# Patient Record
Sex: MALE | Race: WHITE | Employment: UNEMPLOYED | URBAN - METROPOLITAN AREA
[De-identification: names, ages, dates, MRNs, and addresses within clinical notes are randomized per-mention and may not be internally consistent; named-entity substitution may affect disease eponyms.]

---

## 2018-04-11 ENCOUNTER — OFFICE VISIT (OUTPATIENT)
Dept: FAMILY MEDICINE | Facility: CLINIC | Age: 28
End: 2018-04-11
Payer: COMMERCIAL

## 2018-04-11 VITALS
HEART RATE: 87 BPM | SYSTOLIC BLOOD PRESSURE: 121 MMHG | WEIGHT: 166 LBS | HEIGHT: 70 IN | DIASTOLIC BLOOD PRESSURE: 82 MMHG | BODY MASS INDEX: 23.77 KG/M2 | TEMPERATURE: 96.3 F

## 2018-04-11 DIAGNOSIS — F41.1 GAD (GENERALIZED ANXIETY DISORDER): Primary | ICD-10-CM

## 2018-04-11 PROCEDURE — 99213 OFFICE O/P EST LOW 20 MIN: CPT | Performed by: FAMILY MEDICINE

## 2018-04-11 ASSESSMENT — ANXIETY QUESTIONNAIRES
3. WORRYING TOO MUCH ABOUT DIFFERENT THINGS: NOT AT ALL
6. BECOMING EASILY ANNOYED OR IRRITABLE: NOT AT ALL
7. FEELING AFRAID AS IF SOMETHING AWFUL MIGHT HAPPEN: NOT AT ALL
2. NOT BEING ABLE TO STOP OR CONTROL WORRYING: NOT AT ALL
GAD7 TOTAL SCORE: 0
5. BEING SO RESTLESS THAT IT IS HARD TO SIT STILL: NOT AT ALL
1. FEELING NERVOUS, ANXIOUS, OR ON EDGE: NOT AT ALL

## 2018-04-11 ASSESSMENT — PATIENT HEALTH QUESTIONNAIRE - PHQ9: 5. POOR APPETITE OR OVEREATING: NOT AT ALL

## 2018-04-11 NOTE — MR AVS SNAPSHOT
After Visit Summary   4/11/2018    Kyle Ayala    MRN: [id number]           Patient Information     Date Of Birth          1990        Visit Information        Provider Department      4/11/2018 8:00 AM David García MD [facility]        Today's Diagnoses     BRIGETTE (generalized anxiety disorder)    -  1       Follow-ups after your visit        Additional Services     MENTAL HEALTH REFERRAL  - Adult; Psychiatry and Medication Management; [place]iatry[place] [facility] [place]   [place], [place] [phone]  Medication management & future refills will be returned to [facility] PCP upon compl...       All scheduling is subject to the client's specific insurance plan & benefits, provider/location availability, and provider clinical specialities.  Please arrive 15 minutes early for your first appointment and bring your completed paperwork.    Please be aware that coverage of these services is subject to the terms and limitations of your health insurance plan.  Call member services at your health plan with any benefit or coverage questions.                            Who to contact     If you have questions or need follow up information about today's clinic visit or your schedule please contact [facility] directly at [phone].  Normal or non-critical lab and imaging results will be communicated to you by MyChart, letter or phone within 4 business days after the clinic has received the results. If you do not hear from us within 7 days, please contact the clinic through MyChart or phone. If you have a critical or abnormal lab result, we will notify you by phone as soon as possible.  Submit refill requests through [employer] or call your pharmacy and they will forward the refill request to us. Please allow 3 business days for your refill to be completed.          Additional Information About Your Visit        MyChart Information     MyChart  "lets you send messages to your doctor, view your test results, renew your prescriptions, schedule appointments and more. To sign up, go to [email or web address] . Click on \"Log in\" on the left side of the screen, which will take you to the Welcome page. Then click on \"Sign up Now\" on the right side of the page.     You will be asked to enter the access code listed below, as well as some personal information. Please follow the directions to create your username and password.     Your access code is: [email or web address]  Expires: 7/10/2018  8:29 AM     Your access code will  in 90 days. If you need help or a new code, please call your [facility] clinic or [phone].        Care EveryWhere ID     This is your Care EveryWhere ID. This could be used by other organizations to access your [facility] medical records  [id number]        Your Vitals Were     Pulse Temperature Height BMI (Body Mass Index)          87 96.3  F (35.7  C) (Tympanic) 5' 10.25\" (1.784 m) 23.65 kg/m2         Blood Pressure from Last 3 Encounters:   18 121/82   10/28/16 142/81   16 126/76    Weight from Last 3 Encounters:   18 166 lb (75.3 kg)   10/28/16 137 lb 9.1 oz (62.4 kg)   16 141 lb 9.6 oz (64.2 kg)              We Performed the Following     DEPRESSION ACTION PLAN (DAP)     MENTAL HEALTH REFERRAL  - Adult; Psychiatry and Medication Management; [place]iatry[place] [facility] [place]   [place], [place] [phone]  Medication management & future refills will be returned to [facility] PCP upon compl...        Primary Care Provider Office Phone # Fax #    [facility] [phone] [address] [place] [address]        Equal Access to Services     PURNIMA ELI AH: marie Trevino, tony shaffer. [facility] [phone].    ATENCIÓN: Si habla español, tiene a monsalve disposición " servicios gratuitos de asistencia lingüística. Janice olivarez [phone].    We comply with applicable federal civil rights laws and [place] laws. We do not discriminate on the basis of race, color, national origin, age, disability, sex, sexual orientation, or gender identity.            Thank you!     Thank you for choosing [facility]  for your care. Our goal is always to provide you with excellent care. Hearing back from our patients is one way we can continue to improve our services. Please take a few minutes to complete the written survey that you may receive in the mail after your visit with us. Thank you!             Your Updated Medication List - Protect others around you: Learn how to safely use, store and throw away your medicines at www.disposemymeds.org.          This list is accurate as of 4/11/18  8:29 AM.  Always use your most recent med list.                   Brand Name Dispense Instructions for use Diagnosis    albuterol 108 (90 Base) MCG/ACT Inhaler    PROAIR HFA/PROVENTIL HFA/VENTOLIN HFA    1 Inhaler    Inhale 2 puffs into the lungs every 6 hours as needed for shortness of breath / dyspnea or wheezing    Moderate persistent asthma without complication       diazepam 5 MG tablet    VALIUM    30 tablet    Take 1 tablet (5 mg) by mouth every 8 hours as needed for anxiety (or tremor)    Alcohol abuse       fluticasone-vilanterol 100-25 MCG/INH oral inhaler    BREO ELLIPTA    3 Inhaler    Inhale 1 puff into the lungs daily    Mild intermittent asthma without complication

## 2018-04-11 NOTE — PROGRESS NOTES
SUBJECTIVE:   Kyle Ayala is a 27 year old male who presents to clinic today for the following health issues:      Patient needs a note for  dog for apt building that he is living in, he was taking a few medications for anxiety, medication were not helping.   Dr. Bridget Rendon said he should give a  dog a try and it has helped      Patient is a 27 yr old male with long history of mental illnesses including depression and anxiety . Review of his chart also reveals some dependence on alcohol. He comes in requesting a letter stating his need for a  animal. He says that he is presently on no medication and what seems to help is a  dog . He is staying in an apartment where pets are not allowed. I told patient it is a difficult thing to assess if indeed he needs a  animal . I will prefer that he sees a psychiatrist for this need.     Problem list and histories reviewed & adjusted, as indicated.  Additional history: as documented    Patient Active Problem List   Diagnosis     Loss of weight     Tobacco abuse     Newly recognized heart murmur     Vomiting     Anxiety     Health Care Home     Mild intermittent asthma     Mild major depression     Acute renal failure (ARF) (H)     Dehydration     Hypokalemia     Hyponatremia     Alcohol dependence (H)     Acute hyperactive alcohol withdrawal delirium (H)     PAF (paroxysmal atrial fibrillation) (H)     Non-rheumatic mitral regurgitation     History reviewed. No pertinent surgical history.    Social History   Substance Use Topics     Smoking status: Current Every Day Smoker     Packs/day: 1.00     Types: Cigarettes     Smokeless tobacco: Never Used     Alcohol use Yes     Family History   Problem Relation Age of Onset     Alcohol/Drug Mother      Asthma Father      Respiratory Father 54     COPD     Alcohol/Drug Father      Alzheimer Disease Maternal Grandfather      dementia     C.A.D. Paternal Grandfather      HEART DISEASE  "Brother      congenital heart, heart surgery when born     Alcohol/Drug Brother          Current Outpatient Prescriptions   Medication Sig Dispense Refill     diazepam (VALIUM) 5 MG tablet Take 1 tablet (5 mg) by mouth every 8 hours as needed for anxiety (or tremor) (Patient not taking: Reported on 4/11/2018) 30 tablet 0     fluticasone - vilanterol (BREO ELLIPTA) 100-25 MCG/INH oral inhaler Inhale 1 puff into the lungs daily (Patient not taking: Reported on 4/11/2018) 3 Inhaler 1     albuterol (PROAIR HFA, PROVENTIL HFA, VENTOLIN HFA) 108 (90 BASE) MCG/ACT inhaler Inhale 2 puffs into the lungs every 6 hours as needed for shortness of breath / dyspnea or wheezing (Patient not taking: Reported on 4/11/2018) 1 Inhaler 0     Allergies   Allergen Reactions     Penicillins      Patient says he was allergic to penicllin as a child but not anymore     BP Readings from Last 3 Encounters:   04/11/18 121/82   10/28/16 142/81   03/16/16 126/76    Wt Readings from Last 3 Encounters:   04/11/18 166 lb (75.3 kg)   10/28/16 137 lb 9.1 oz (62.4 kg)   03/03/16 141 lb 9.6 oz (64.2 kg)                  Labs reviewed in EPIC    Reviewed and updated as needed this visit by clinical staff  Tobacco  Allergies  Med Hx  Surg Hx  Fam Hx  Soc Hx      Reviewed and updated as needed this visit by Provider         ROS:  Constitutional, HEENT, cardiovascular, pulmonary, gi and gu systems are negative, except as otherwise noted.    OBJECTIVE:     /82 (BP Location: Left arm, Cuff Size: Adult Regular)  Pulse 87  Temp 96.3  F (35.7  C) (Tympanic)  Ht 5' 10.25\" (1.784 m)  Wt 166 lb (75.3 kg)  BMI 23.65 kg/m2  Body mass index is 23.65 kg/(m^2).  GENERAL: healthy, alert and no distress  EYES: Eyes grossly normal to inspection, PERRL and conjunctivae and sclerae normal  HENT: ear canals and TM's normal, nose and mouth without ulcers or lesions  NECK: no adenopathy, no asymmetry, masses, or scars and thyroid normal to palpation  RESP: " lungs clear to auscultation - no rales, rhonchi or wheezes  CV: regular rate and rhythm, normal S1 S2, no S3 or S4, no murmur, click or rub, no peripheral edema and peripheral pulses strong  MS: no gross musculoskeletal defects noted, no edema  PSYCH: mentation appears normal and affect normal/bright    Diagnostic Test Results:  none     ASSESSMENT/PLAN:         (F41.1) BRIGETTE (generalized anxiety disorder)  (primary encounter diagnosis)  Comment: Referred to psychiatry for medication and assess  Need for a  animal  Plan: MENTAL HEALTH REFERRAL  - Adult; Psychiatry and        Medication Management; Psychiatry; [facility] [place]:         Collaborative Care Psychiatry Service -         [place], [place] [phone]          Medication management & future refills will be         returned to G PCP upon compl...              FUTURE APPOINTMENTS:       - Follow-up visit as needed    David García MD  [facility]

## 2018-04-11 NOTE — NURSING NOTE
"Chief Complaint   Patient presents with     Letter Request     for ton dag        Initial /82 (BP Location: Left arm, Cuff Size: Adult Regular)  Pulse 87  Temp 96.3  F (35.7  C) (Tympanic)  Ht 5' 10.25\" (1.784 m)  Wt 166 lb (75.3 kg)  BMI 23.65 kg/m2 Estimated body mass index is 23.65 kg/(m^2) as calculated from the following:    Height as of this encounter: 5' 10.25\" (1.784 m).    Weight as of this encounter: 166 lb (75.3 kg).  Medication Reconciliation: complete  "

## 2018-04-12 ASSESSMENT — ASTHMA QUESTIONNAIRES: ACT_TOTALSCORE: 25

## 2018-04-12 ASSESSMENT — PATIENT HEALTH QUESTIONNAIRE - PHQ9: SUM OF ALL RESPONSES TO PHQ QUESTIONS 1-9: 0

## 2018-04-12 ASSESSMENT — ANXIETY QUESTIONNAIRES: GAD7 TOTAL SCORE: 0

## 2018-09-10 ENCOUNTER — OFFICE VISIT (OUTPATIENT)
Dept: FAMILY MEDICINE | Facility: CLINIC | Age: 28
End: 2018-09-10

## 2018-09-10 VITALS
BODY MASS INDEX: 20.96 KG/M2 | OXYGEN SATURATION: 98 % | TEMPERATURE: 98.8 F | HEART RATE: 111 BPM | HEIGHT: 70 IN | DIASTOLIC BLOOD PRESSURE: 72 MMHG | WEIGHT: 146.4 LBS | SYSTOLIC BLOOD PRESSURE: 122 MMHG

## 2018-09-10 DIAGNOSIS — G47.00 INSOMNIA, UNSPECIFIED TYPE: Primary | ICD-10-CM

## 2018-09-10 DIAGNOSIS — F41.9 ANXIETY: ICD-10-CM

## 2018-09-10 DIAGNOSIS — J45.40 MODERATE PERSISTENT ASTHMA WITHOUT COMPLICATION: ICD-10-CM

## 2018-09-10 PROCEDURE — 99214 OFFICE O/P EST MOD 30 MIN: CPT | Performed by: NURSE PRACTITIONER

## 2018-09-10 RX ORDER — DIAZEPAM 5 MG
5 TABLET ORAL DAILY PRN
Qty: 10 TABLET | Refills: 1 | Status: ON HOLD | OUTPATIENT
Start: 2018-09-10 | End: 2019-03-08

## 2018-09-10 RX ORDER — ALBUTEROL SULFATE 90 UG/1
2 AEROSOL, METERED RESPIRATORY (INHALATION) EVERY 6 HOURS PRN
Qty: 1 INHALER | Refills: 0 | Status: SHIPPED | OUTPATIENT
Start: 2018-09-10 | End: 2021-03-03

## 2018-09-10 ASSESSMENT — ANXIETY QUESTIONNAIRES
1. FEELING NERVOUS, ANXIOUS, OR ON EDGE: MORE THAN HALF THE DAYS
GAD7 TOTAL SCORE: 14
6. BECOMING EASILY ANNOYED OR IRRITABLE: MORE THAN HALF THE DAYS
5. BEING SO RESTLESS THAT IT IS HARD TO SIT STILL: MORE THAN HALF THE DAYS
IF YOU CHECKED OFF ANY PROBLEMS ON THIS QUESTIONNAIRE, HOW DIFFICULT HAVE THESE PROBLEMS MADE IT FOR YOU TO DO YOUR WORK, TAKE CARE OF THINGS AT HOME, OR GET ALONG WITH OTHER PEOPLE: VERY DIFFICULT
3. WORRYING TOO MUCH ABOUT DIFFERENT THINGS: MORE THAN HALF THE DAYS
2. NOT BEING ABLE TO STOP OR CONTROL WORRYING: MORE THAN HALF THE DAYS
7. FEELING AFRAID AS IF SOMETHING AWFUL MIGHT HAPPEN: SEVERAL DAYS
4. TROUBLE RELAXING: NEARLY EVERY DAY

## 2018-09-10 NOTE — PROGRESS NOTES
"  SUBJECTIVE:   Kyle Ayala is a 28 year old male who presents to clinic today for the following health issues:    Chief Complaint   Patient presents with     Insomnia     Hasnt been sleeping well, has been taking care of his mom who has dementia      Anxiety     Has been getting worse      Refill Request     albuterol      Weight Loss     Has lost 20 pounds since April        Anxiety Follow-Up    Status since last visit: Worsened, taking care of his mom who has dementia     Other associated symptoms:None    Complicating factors:   Significant life event: Yes-  Taking care of mom who has dementia    Current substance abuse: Cannabis  Depression symptoms: has been feeling sad   BRIGETTE-7 SCORE 1/29/2016 4/11/2018 9/10/2018   Total Score - - -   Total Score 12 0 14       BRIGETTE-7    Amount of exercise or physical activity: walks his dog every day     Problems taking medications regularly: No    Medication side effects: none    Diet: regular (no restrictions)      Problem list and histories reviewed & adjusted, as indicated.  Additional history: as documented    Labs reviewed in EPIC    Reviewed and updated as needed this visit by clinical staff  Tobacco  Allergies  Med Hx  Surg Hx  Fam Hx  Soc Hx      Reviewed and updated as needed this visit by Provider         ROS:  Constitutional, HEENT, cardiovascular, pulmonary, gi and gu systems are negative, except as otherwise noted.    OBJECTIVE:     /72  Pulse 111  Temp 98.8  F (37.1  C) (Tympanic)  Ht 5' 10.25\" (1.784 m)  Wt 146 lb 6.4 oz (66.4 kg)  SpO2 98%  BMI 20.86 kg/m2  Body mass index is 20.86 kg/(m^2).  GENERAL: healthy, alert and no distress  RESP: lungs clear to auscultation - no rales, rhonchi or wheezes  CV: regular rate and rhythm, normal S1 S2, no S3 or S4, no murmur, click or rub, no peripheral edema and peripheral pulses strong  SKIN: no suspicious lesions or rashes  PSYCH: mentation appears normal, affect normal/bright    Diagnostic Test " Results:  none     ASSESSMENT/PLAN:     1. Insomnia, unspecified type  -he already tried Trazodone and Hydroxyzine prescribed by [facility] states it did not help.   -he lost about 20 lbs due to anxiety, forget to eat sometimes, recommended Remeron, but he declined, stating he would like to read about online before he makes decision.   -he will follow up in 1-2 months     2. Anxiety  -provided with small Valium prescription, maximum 10 tablets per month for up to 2 months  -follow up in 1-2 months   - start sertraline (ZOLOFT) 50 MG tablet; Take 1 tablet (50 mg) by mouth daily  Dispense: 30 tablet; Refill: 2  - diazepam (VALIUM) 5 MG tablet; Take 1 tablet (5 mg) by mouth daily as needed for anxiety or sleep  Dispense: 10 tablet; Refill: 1    3. Moderate persistent asthma without complication  -stable, well controlled   - albuterol (PROAIR HFA/PROVENTIL HFA/VENTOLIN HFA) 108 (90 Base) MCG/ACT inhaler; Inhale 2 puffs into the lungs every 6 hours as needed for shortness of breath / dyspnea or wheezing  Dispense: 1 Inhaler; Refill: 0    See Patient Instructions    YOSEF Ramirez [facility]

## 2018-09-10 NOTE — MR AVS SNAPSHOT
"              After Visit Summary   9/10/2018    Kyle Ayala    MRN: [id number]           Patient Information     Date Of Birth          1990        Visit Information        Provider Department      9/10/2018 12:40 PM Carrie Sanchez APRN CNP [facility]        Today's Diagnoses     Insomnia, unspecified type    -  1    Anxiety          Care Instructions    Stat Zoloft 50 mg daily    Valium up to 10 tablets per months, for short term use only, will plan up to 2 months     Please consider other meds for sleep: remeron, silenor  Remeron can also help with weight loss                 Follow-ups after your visit        Who to contact     If you have questions or need follow up information about today's clinic visit or your schedule please contact [facility] directly at [phone].  Normal or non-critical lab and imaging results will be communicated to you by [employer], letter or phone within 4 business days after the clinic has received the results. If you do not hear from us within 7 days, please contact the clinic through [employer] or phone. If you have a critical or abnormal lab result, we will notify you by phone as soon as possible.  Submit refill requests through [employer] or call your pharmacy and they will forward the refill request to us. Please allow 3 business days for your refill to be completed.          Additional Information About Your Visit        MyChart Information     [employer] lets you send messages to your doctor, view your test results, renew your prescriptions, schedule appointments and more. To sign up, go to [email or web address] . Click on \"Log in\" on the left side of the screen, which will take you to the Welcome page. Then click on \"Sign up Now\" on the right side of the page.     You will be asked to enter the access code listed below, as well as some personal information. Please follow the directions to create your username and password.     Your access " "code is: [email or web address]  Expires: 2018 12:27 PM     Your access code will  in 90 days. If you need help or a new code, please call your [facility] clinic or [phone].        Care EveryWhere ID     This is your Care EveryWhere ID. This could be used by other organizations to access your [facility] medical records  [id number]        Your Vitals Were     Pulse Temperature Height Pulse Oximetry BMI (Body Mass Index)       111 98.8  F (37.1  C) (Tympanic) 5' 10.25\" (1.784 m) 98% 20.86 kg/m2        Blood Pressure from Last 3 Encounters:   09/10/18 122/72   18 121/82   10/28/16 142/81    Weight from Last 3 Encounters:   09/10/18 146 lb 6.4 oz (66.4 kg)   18 166 lb (75.3 kg)   10/28/16 137 lb 9.1 oz (62.4 kg)              Today, you had the following     No orders found for display         Today's Medication Changes          These changes are accurate as of 9/10/18  1:05 PM.  If you have any questions, ask your nurse or doctor.               Start taking these medicines.        Dose/Directions    diazepam 5 MG tablet   Commonly known as:  VALIUM   Used for:  Anxiety   Started by:  Carrie Sanchez APRN CNP        Dose:  5 mg   Take 1 tablet (5 mg) by mouth daily as needed for anxiety or sleep   Quantity:  10 tablet   Refills:  1       sertraline 50 MG tablet   Commonly known as:  ZOLOFT   Used for:  Anxiety   Started by:  Carrie Sanchez APRN CNP        Dose:  50 mg   Take 1 tablet (50 mg) by mouth daily   Quantity:  30 tablet   Refills:  2            Where to get your medicines      These medications were sent to [facility] [place], [place] - [address]  [address] [address]     Phone:  [phone]     sertraline 50 MG tablet         Some of these will need a paper prescription and others can be bought over the counter.  Ask your nurse if you have questions.     Bring a paper prescription for each of these medications     diazepam 5 MG tablet       "          Primary Care Provider Fax #    Physician No Ref-Primary [phone]       No address on file        Equal Access to Services     PURNIMA ELI : Hadii aad ku hadmodesto Euceda, wakorinada marthadiana, naika kadagmarda brigette, tony aaronin hayaadwight sernamumtaz jain laSheylawalt dallas. So [facility] [phone].    ATENCIÓN: Si habla español, tiene a monsalve disposición servicios gratuitos de asistencia lingüística. Llame al [phone].    We comply with applicable federal civil rights laws and [place] laws. We do not discriminate on the basis of race, color, national origin, age, disability, sex, sexual orientation, or gender identity.            Thank you!     Thank you for choosing [facility]  for your care. Our goal is always to provide you with excellent care. Hearing back from our patients is one way we can continue to improve our services. Please take a few minutes to complete the written survey that you may receive in the mail after your visit with us. Thank you!             Your Updated Medication List - Protect others around you: Learn how to safely use, store and throw away your medicines at www.disposemymeds.org.          This list is accurate as of 9/10/18  1:05 PM.  Always use your most recent med list.                   Brand Name Dispense Instructions for use Diagnosis    albuterol 108 (90 Base) MCG/ACT inhaler    PROAIR HFA/PROVENTIL HFA/VENTOLIN HFA    1 Inhaler    Inhale 2 puffs into the lungs every 6 hours as needed for shortness of breath / dyspnea or wheezing    Moderate persistent asthma without complication       diazepam 5 MG tablet    VALIUM    10 tablet    Take 1 tablet (5 mg) by mouth daily as needed for anxiety or sleep    Anxiety       fluticasone-vilanterol 100-25 MCG/INH inhaler    BREO ELLIPTA    3 Inhaler    Inhale 1 puff into the lungs daily    Mild intermittent asthma without complication       sertraline 50 MG tablet    ZOLOFT    30 tablet    Take 1 tablet (50 mg) by mouth daily     Anxiety

## 2018-09-10 NOTE — PATIENT INSTRUCTIONS
Stat Zoloft 50 mg daily    Valium up to 10 tablets per months, for short term use only, will plan up to 2 months     Please consider other meds for sleep: remeron, silenor  Remeron can also help with weight loss

## 2018-09-11 ASSESSMENT — ANXIETY QUESTIONNAIRES: GAD7 TOTAL SCORE: 14

## 2018-09-11 ASSESSMENT — PATIENT HEALTH QUESTIONNAIRE - PHQ9: SUM OF ALL RESPONSES TO PHQ QUESTIONS 1-9: 11

## 2019-03-07 ENCOUNTER — APPOINTMENT (OUTPATIENT)
Dept: GENERAL RADIOLOGY | Facility: CLINIC | Age: 29
End: 2019-03-07
Attending: FAMILY MEDICINE
Payer: MEDICAID

## 2019-03-07 ENCOUNTER — HOSPITAL ENCOUNTER (OUTPATIENT)
Facility: CLINIC | Age: 29
Setting detail: OBSERVATION
Discharge: HOME | End: 2019-03-08
Attending: FAMILY MEDICINE | Admitting: FAMILY MEDICINE
Payer: MEDICAID

## 2019-03-07 DIAGNOSIS — F10.930 ALCOHOL WITHDRAWAL SYNDROME WITHOUT COMPLICATION (H): ICD-10-CM

## 2019-03-07 PROBLEM — E87.29 METABOLIC ACIDOSIS, INCREASED ANION GAP: Status: ACTIVE | Noted: 2019-03-07

## 2019-03-07 LAB
ALBUMIN SERPL-MCNC: 5.6 G/DL (ref 3.4–5)
ALP SERPL-CCNC: 89 U/L (ref 40–150)
ALT SERPL W P-5'-P-CCNC: 32 U/L (ref 0–70)
AMPHETAMINES UR QL SCN: NEGATIVE
ANION GAP SERPL CALCULATED.3IONS-SCNC: 17 MMOL/L (ref 3–14)
APAP SERPL-MCNC: <2 MG/L (ref 10–20)
AST SERPL W P-5'-P-CCNC: 36 U/L (ref 0–45)
BARBITURATES UR QL: NEGATIVE
BASE EXCESS BLDV CALC-SCNC: 14.9 MMOL/L
BASOPHILS # BLD AUTO: 0 10E9/L (ref 0–0.2)
BASOPHILS NFR BLD AUTO: 0 %
BENZODIAZ UR QL: POSITIVE
BILIRUB SERPL-MCNC: 1.2 MG/DL (ref 0.2–1.3)
BUN SERPL-MCNC: 24 MG/DL (ref 7–30)
CALCIUM SERPL-MCNC: 9.2 MG/DL (ref 8.5–10.1)
CANNABINOIDS UR QL SCN: POSITIVE
CHLORIDE SERPL-SCNC: 75 MMOL/L (ref 94–109)
CO2 SERPL-SCNC: 36 MMOL/L (ref 20–32)
COCAINE UR QL: NEGATIVE
CREAT SERPL-MCNC: 2 MG/DL (ref 0.66–1.25)
DIFFERENTIAL METHOD BLD: ABNORMAL
EOSINOPHIL # BLD AUTO: 0 10E9/L (ref 0–0.7)
EOSINOPHIL NFR BLD AUTO: 0 %
ERYTHROCYTE [DISTWIDTH] IN BLOOD BY AUTOMATED COUNT: 12.5 % (ref 10–15)
ETHANOL SERPL-MCNC: <0.01 G/DL
GFR SERPL CREATININE-BSD FRML MDRD: 44 ML/MIN/{1.73_M2}
GLUCOSE SERPL-MCNC: 144 MG/DL (ref 70–99)
HCO3 BLDV-SCNC: 39 MMOL/L (ref 21–28)
HCT VFR BLD AUTO: 49.9 % (ref 40–53)
HGB BLD-MCNC: 17.8 G/DL (ref 13.3–17.7)
LIPASE SERPL-CCNC: 119 U/L (ref 73–393)
LYMPHOCYTES # BLD AUTO: 1.5 10E9/L (ref 0.8–5.3)
LYMPHOCYTES NFR BLD AUTO: 8 %
MAGNESIUM SERPL-MCNC: 2.2 MG/DL (ref 1.6–2.3)
MCH RBC QN AUTO: 30.7 PG (ref 26.5–33)
MCHC RBC AUTO-ENTMCNC: 35.7 G/DL (ref 31.5–36.5)
MCV RBC AUTO: 86 FL (ref 78–100)
MONOCYTES # BLD AUTO: 2.3 10E9/L (ref 0–1.3)
MONOCYTES NFR BLD AUTO: 12 %
NEUTROPHILS # BLD AUTO: 15.1 10E9/L (ref 1.6–8.3)
NEUTROPHILS NFR BLD AUTO: 80 %
O2/TOTAL GAS SETTING VFR VENT: ABNORMAL %
OPIATES UR QL SCN: NEGATIVE
PCO2 BLDV: 42 MM HG (ref 40–50)
PCP UR QL SCN: NEGATIVE
PH BLDV: 7.57 PH (ref 7.32–7.43)
PLATELET # BLD AUTO: 387 10E9/L (ref 150–450)
PLATELET # BLD EST: ABNORMAL 10*3/UL
PO2 BLDV: 53 MM HG (ref 25–47)
POTASSIUM SERPL-SCNC: 2.8 MMOL/L (ref 3.4–5.3)
PROT SERPL-MCNC: 9.5 G/DL (ref 6.8–8.8)
RBC # BLD AUTO: 5.8 10E12/L (ref 4.4–5.9)
SALICYLATES SERPL-MCNC: <2 MG/DL
SODIUM SERPL-SCNC: 128 MMOL/L (ref 133–144)
STOMATOCYTES BLD QL SMEAR: SLIGHT
WBC # BLD AUTO: 18.9 10E9/L (ref 4–11)

## 2019-03-07 PROCEDURE — 96376 TX/PRO/DX INJ SAME DRUG ADON: CPT

## 2019-03-07 PROCEDURE — 25000128 H RX IP 250 OP 636: Performed by: FAMILY MEDICINE

## 2019-03-07 PROCEDURE — 25000125 ZZHC RX 250: Performed by: FAMILY MEDICINE

## 2019-03-07 PROCEDURE — 96366 THER/PROPH/DIAG IV INF ADDON: CPT | Performed by: FAMILY MEDICINE

## 2019-03-07 PROCEDURE — 93010 ELECTROCARDIOGRAM REPORT: CPT | Mod: Z6 | Performed by: FAMILY MEDICINE

## 2019-03-07 PROCEDURE — 80329 ANALGESICS NON-OPIOID 1 OR 2: CPT | Performed by: FAMILY MEDICINE

## 2019-03-07 PROCEDURE — G0378 HOSPITAL OBSERVATION PER HR: HCPCS

## 2019-03-07 PROCEDURE — 85025 COMPLETE CBC W/AUTO DIFF WBC: CPT | Performed by: FAMILY MEDICINE

## 2019-03-07 PROCEDURE — 96365 THER/PROPH/DIAG IV INF INIT: CPT | Performed by: FAMILY MEDICINE

## 2019-03-07 PROCEDURE — 25000132 ZZH RX MED GY IP 250 OP 250 PS 637: Performed by: PHYSICIAN ASSISTANT

## 2019-03-07 PROCEDURE — 93005 ELECTROCARDIOGRAM TRACING: CPT | Mod: 76 | Performed by: FAMILY MEDICINE

## 2019-03-07 PROCEDURE — 82803 BLOOD GASES ANY COMBINATION: CPT | Performed by: PHYSICIAN ASSISTANT

## 2019-03-07 PROCEDURE — 25000132 ZZH RX MED GY IP 250 OP 250 PS 637: Performed by: FAMILY MEDICINE

## 2019-03-07 PROCEDURE — 36415 COLL VENOUS BLD VENIPUNCTURE: CPT | Performed by: PHYSICIAN ASSISTANT

## 2019-03-07 PROCEDURE — 80320 DRUG SCREEN QUANTALCOHOLS: CPT | Performed by: FAMILY MEDICINE

## 2019-03-07 PROCEDURE — 71046 X-RAY EXAM CHEST 2 VIEWS: CPT

## 2019-03-07 PROCEDURE — 96361 HYDRATE IV INFUSION ADD-ON: CPT | Performed by: FAMILY MEDICINE

## 2019-03-07 PROCEDURE — 83735 ASSAY OF MAGNESIUM: CPT | Performed by: FAMILY MEDICINE

## 2019-03-07 PROCEDURE — 80307 DRUG TEST PRSMV CHEM ANLYZR: CPT | Performed by: FAMILY MEDICINE

## 2019-03-07 PROCEDURE — 96376 TX/PRO/DX INJ SAME DRUG ADON: CPT | Performed by: FAMILY MEDICINE

## 2019-03-07 PROCEDURE — 96375 TX/PRO/DX INJ NEW DRUG ADDON: CPT | Performed by: FAMILY MEDICINE

## 2019-03-07 PROCEDURE — 99285 EMERGENCY DEPT VISIT HI MDM: CPT | Mod: 25 | Performed by: FAMILY MEDICINE

## 2019-03-07 PROCEDURE — 25000128 H RX IP 250 OP 636: Performed by: PHYSICIAN ASSISTANT

## 2019-03-07 PROCEDURE — 99220 ZZC INITIAL OBSERVATION CARE,LEVL III: CPT | Performed by: PHYSICIAN ASSISTANT

## 2019-03-07 PROCEDURE — 25000128 H RX IP 250 OP 636

## 2019-03-07 PROCEDURE — 96368 THER/DIAG CONCURRENT INF: CPT | Performed by: FAMILY MEDICINE

## 2019-03-07 PROCEDURE — 93005 ELECTROCARDIOGRAM TRACING: CPT | Performed by: FAMILY MEDICINE

## 2019-03-07 PROCEDURE — 80053 COMPREHEN METABOLIC PANEL: CPT | Performed by: FAMILY MEDICINE

## 2019-03-07 PROCEDURE — 25800030 ZZH RX IP 258 OP 636: Performed by: FAMILY MEDICINE

## 2019-03-07 PROCEDURE — 83690 ASSAY OF LIPASE: CPT | Performed by: FAMILY MEDICINE

## 2019-03-07 PROCEDURE — 93010 ELECTROCARDIOGRAM REPORT: CPT | Mod: 76 | Performed by: FAMILY MEDICINE

## 2019-03-07 RX ORDER — LANOLIN ALCOHOL/MO/W.PET/CERES
100 CREAM (GRAM) TOPICAL DAILY
Status: DISCONTINUED | OUTPATIENT
Start: 2019-03-07 | End: 2019-03-07

## 2019-03-07 RX ORDER — POTASSIUM CHLORIDE 1500 MG/1
20-40 TABLET, EXTENDED RELEASE ORAL
Status: DISCONTINUED | OUTPATIENT
Start: 2019-03-07 | End: 2019-03-08 | Stop reason: HOSPADM

## 2019-03-07 RX ORDER — FOLIC ACID 1 MG/1
1 TABLET ORAL DAILY
Status: DISCONTINUED | OUTPATIENT
Start: 2019-03-08 | End: 2019-03-08 | Stop reason: HOSPADM

## 2019-03-07 RX ORDER — ONDANSETRON 2 MG/ML
4 INJECTION INTRAMUSCULAR; INTRAVENOUS ONCE
Status: COMPLETED | OUTPATIENT
Start: 2019-03-07 | End: 2019-03-07

## 2019-03-07 RX ORDER — POTASSIUM CL/LIDO/0.9 % NACL 10MEQ/0.1L
10 INTRAVENOUS SOLUTION, PIGGYBACK (ML) INTRAVENOUS
Status: DISCONTINUED | OUTPATIENT
Start: 2019-03-07 | End: 2019-03-08 | Stop reason: HOSPADM

## 2019-03-07 RX ORDER — ONDANSETRON 2 MG/ML
INJECTION INTRAMUSCULAR; INTRAVENOUS
Status: DISCONTINUED
Start: 2019-03-07 | End: 2019-03-07 | Stop reason: HOSPADM

## 2019-03-07 RX ORDER — LORAZEPAM 1 MG/1
1-2 TABLET ORAL EVERY 30 MIN PRN
Status: DISCONTINUED | OUTPATIENT
Start: 2019-03-07 | End: 2019-03-08 | Stop reason: HOSPADM

## 2019-03-07 RX ORDER — NALOXONE HYDROCHLORIDE 0.4 MG/ML
.1-.4 INJECTION, SOLUTION INTRAMUSCULAR; INTRAVENOUS; SUBCUTANEOUS
Status: DISCONTINUED | OUTPATIENT
Start: 2019-03-07 | End: 2019-03-07

## 2019-03-07 RX ORDER — ALBUTEROL SULFATE 0.83 MG/ML
2.5 SOLUTION RESPIRATORY (INHALATION) EVERY 6 HOURS PRN
Status: DISCONTINUED | OUTPATIENT
Start: 2019-03-07 | End: 2019-03-08 | Stop reason: HOSPADM

## 2019-03-07 RX ORDER — LORAZEPAM 2 MG/ML
1 INJECTION INTRAMUSCULAR ONCE
Status: COMPLETED | OUTPATIENT
Start: 2019-03-07 | End: 2019-03-07

## 2019-03-07 RX ORDER — ACETAMINOPHEN 325 MG/1
650 TABLET ORAL EVERY 4 HOURS PRN
Status: DISCONTINUED | OUTPATIENT
Start: 2019-03-07 | End: 2019-03-08 | Stop reason: HOSPADM

## 2019-03-07 RX ORDER — ONDANSETRON 2 MG/ML
4 INJECTION INTRAMUSCULAR; INTRAVENOUS EVERY 6 HOURS PRN
Status: DISCONTINUED | OUTPATIENT
Start: 2019-03-07 | End: 2019-03-08 | Stop reason: HOSPADM

## 2019-03-07 RX ORDER — LANOLIN ALCOHOL/MO/W.PET/CERES
100 CREAM (GRAM) TOPICAL DAILY
Status: DISCONTINUED | OUTPATIENT
Start: 2019-03-08 | End: 2019-03-08 | Stop reason: HOSPADM

## 2019-03-07 RX ORDER — SODIUM CHLORIDE, SODIUM LACTATE, POTASSIUM CHLORIDE, CALCIUM CHLORIDE 600; 310; 30; 20 MG/100ML; MG/100ML; MG/100ML; MG/100ML
INJECTION, SOLUTION INTRAVENOUS CONTINUOUS
Status: DISCONTINUED | OUTPATIENT
Start: 2019-03-07 | End: 2019-03-08 | Stop reason: HOSPADM

## 2019-03-07 RX ORDER — NALOXONE HYDROCHLORIDE 0.4 MG/ML
.1-.4 INJECTION, SOLUTION INTRAMUSCULAR; INTRAVENOUS; SUBCUTANEOUS
Status: DISCONTINUED | OUTPATIENT
Start: 2019-03-07 | End: 2019-03-08 | Stop reason: HOSPADM

## 2019-03-07 RX ORDER — NICOTINE 21 MG/24HR
1 PATCH, TRANSDERMAL 24 HOURS TRANSDERMAL DAILY
Status: DISCONTINUED | OUTPATIENT
Start: 2019-03-07 | End: 2019-03-08 | Stop reason: HOSPADM

## 2019-03-07 RX ORDER — SODIUM CHLORIDE, SODIUM LACTATE, POTASSIUM CHLORIDE, CALCIUM CHLORIDE 600; 310; 30; 20 MG/100ML; MG/100ML; MG/100ML; MG/100ML
INJECTION, SOLUTION INTRAVENOUS CONTINUOUS
Status: DISCONTINUED | OUTPATIENT
Start: 2019-03-07 | End: 2019-03-07

## 2019-03-07 RX ORDER — ACETAMINOPHEN 650 MG/1
650 SUPPOSITORY RECTAL EVERY 4 HOURS PRN
Status: DISCONTINUED | OUTPATIENT
Start: 2019-03-07 | End: 2019-03-08 | Stop reason: HOSPADM

## 2019-03-07 RX ORDER — PROCHLORPERAZINE MALEATE 10 MG
10 TABLET ORAL EVERY 6 HOURS PRN
Status: DISCONTINUED | OUTPATIENT
Start: 2019-03-07 | End: 2019-03-08 | Stop reason: HOSPADM

## 2019-03-07 RX ORDER — LORAZEPAM 2 MG/ML
1-2 INJECTION INTRAMUSCULAR EVERY 30 MIN PRN
Status: DISCONTINUED | OUTPATIENT
Start: 2019-03-07 | End: 2019-03-08 | Stop reason: HOSPADM

## 2019-03-07 RX ORDER — ONDANSETRON 4 MG/1
4 TABLET, ORALLY DISINTEGRATING ORAL EVERY 6 HOURS PRN
Status: DISCONTINUED | OUTPATIENT
Start: 2019-03-07 | End: 2019-03-08 | Stop reason: HOSPADM

## 2019-03-07 RX ORDER — MULTIPLE VITAMINS W/ MINERALS TAB 9MG-400MCG
1 TAB ORAL DAILY
Status: DISCONTINUED | OUTPATIENT
Start: 2019-03-08 | End: 2019-03-08 | Stop reason: HOSPADM

## 2019-03-07 RX ORDER — ONDANSETRON 2 MG/ML
INJECTION INTRAMUSCULAR; INTRAVENOUS
Status: COMPLETED
Start: 2019-03-07 | End: 2019-03-07

## 2019-03-07 RX ORDER — PROCHLORPERAZINE 25 MG
25 SUPPOSITORY, RECTAL RECTAL EVERY 12 HOURS PRN
Status: DISCONTINUED | OUTPATIENT
Start: 2019-03-07 | End: 2019-03-08 | Stop reason: HOSPADM

## 2019-03-07 RX ADMIN — SODIUM CHLORIDE 1000 ML: 9 INJECTION, SOLUTION INTRAVENOUS at 09:42

## 2019-03-07 RX ADMIN — ONDANSETRON 4 MG: 2 INJECTION INTRAMUSCULAR; INTRAVENOUS at 09:32

## 2019-03-07 RX ADMIN — SODIUM CHLORIDE, POTASSIUM CHLORIDE, SODIUM LACTATE AND CALCIUM CHLORIDE: 600; 310; 30; 20 INJECTION, SOLUTION INTRAVENOUS at 09:00

## 2019-03-07 RX ADMIN — Medication 10 MEQ: at 21:12

## 2019-03-07 RX ADMIN — LORAZEPAM 1 MG: 1 TABLET ORAL at 20:19

## 2019-03-07 RX ADMIN — ONDANSETRON 4 MG: 2 INJECTION INTRAMUSCULAR; INTRAVENOUS at 12:16

## 2019-03-07 RX ADMIN — LORAZEPAM 1 MG: 2 INJECTION INTRAMUSCULAR; INTRAVENOUS at 09:17

## 2019-03-07 RX ADMIN — Medication 10 MEQ: at 20:01

## 2019-03-07 RX ADMIN — NICOTINE 1 PATCH: 14 PATCH TRANSDERMAL at 16:23

## 2019-03-07 RX ADMIN — Medication 10 MEQ: at 18:52

## 2019-03-07 RX ADMIN — FOLIC ACID: 5 INJECTION, SOLUTION INTRAMUSCULAR; INTRAVENOUS; SUBCUTANEOUS at 10:36

## 2019-03-07 RX ADMIN — Medication 10 MEQ: at 16:18

## 2019-03-07 RX ADMIN — Medication 10 MEQ: at 17:28

## 2019-03-07 RX ADMIN — ONDANSETRON 4 MG: 2 INJECTION INTRAMUSCULAR; INTRAVENOUS at 20:20

## 2019-03-07 RX ADMIN — SODIUM CHLORIDE, POTASSIUM CHLORIDE, SODIUM LACTATE AND CALCIUM CHLORIDE: 600; 310; 30; 20 INJECTION, SOLUTION INTRAVENOUS at 12:19

## 2019-03-07 RX ADMIN — Medication 10 MEQ: at 10:36

## 2019-03-07 RX ADMIN — LORAZEPAM 2 MG: 1 TABLET ORAL at 12:16

## 2019-03-07 ASSESSMENT — ENCOUNTER SYMPTOMS
FEVER: 1
DYSURIA: 0
DIAPHORESIS: 1
PALPITATIONS: 1
BLOOD IN STOOL: 0
HEADACHES: 0
SHORTNESS OF BREATH: 0
CONSTIPATION: 0
VOMITING: 1
CHILLS: 1
TREMORS: 1
SORE THROAT: 0
ABDOMINAL PAIN: 0
COUGH: 0
SINUS PRESSURE: 0
DIARRHEA: 0
NAUSEA: 1
WEAKNESS: 1
FREQUENCY: 0
WHEEZING: 0

## 2019-03-07 ASSESSMENT — MIFFLIN-ST. JEOR: SCORE: 1539.75

## 2019-03-07 NOTE — H&P
"[facility]    History and Physical - Hospitalist Service       Date of Admission:  3/7/2019    Assessment & Plan   Kyle Ayala is a 28 year old male admitted on 3/7/2019. He has a past medical history significant for asthma, anxiety, alcohol dependence with withdrawal, and tobacco dependence who presented to the emergency department for evaluation of \"hand locking,\" palpitations, and excessive vomiting after heavy alcohol use.      Alcohol withdrawal  Alcohol dependence  Presents with alcohol level < 0.01. Last drink 2 days prior to admission. History of previous withdrawal, no known history of seizure or need for intubation. Was started on CIWA in the emergency department.  - CIWA protocol with ativan  - Daily oral vitamins  - Prn antiemetics    Leukocytosis  Admit WBC 18.9 with left shift, afebrile upon admission. Chest x-ray unremarkable. Reported dysuria. Possible UTI or other infection vs hemoconcentration.  - UA/UC pending  - CBC in the morning  - No antibiotics unless he spikes a fever or UA positive      Acute kidney failure (H)  Dehydration  Admit creatinine 2.00 (baseline 0.77), GFR 44 (> 90). Likely prerenal due to dehydration. Was given 1L NS in the emergency department.  - LR @ 125  - BMP in am      Metabolic acidosis, increased anion gap  Anion gap of 17, CO2 36 on BMP, no associated VBG to go with it. Likely due to dehydration and vomiting, expect to improve with IV fluids.  - VBG pending  - BMP in am      Hypokalemia  Admit K 2.8. Likely due to losses from vomiting.  - K protocol     Hyponatremia  Admit Na 128. Likely due to dehydration. Was given 1L NS in the emergency department.  - Rehydrate with LR @ 125 ml/hr  - BMP in am      Non-rheumatic mitral regurgitation  Noted per problem list. Echo in 2016 with EF 55-60%, moderate mitral regurgitation.      Mild intermittent asthma  Stable respiratory status upon admission. Managed prior to admission with Breo 100/25.  - " "Hold prior to admission Breo  - Prn albuterol available    Mild major depression  Anxiety  Managed prior to admission with prn Valium.  - Hold prior to admission Valium while on CIWA    Tobacco abuse  Encourage cessation. Nicotine patch available.         Diet: Regular Diet Adult    DVT Prophylaxis: Low Risk/Ambulatory with no VTE prophylaxis indicated  Vazquez Catheter: not present  Code Status: Full Code      Disposition Plan   Expected discharge: Tomorrow, recommended to prior living arrangement once renal function improved, withdrawal symptoms subside, and acidosis normalizes.  Entered: Jennifer Patrick PA-C 03/07/2019, 4:45 PM     The patient's care was discussed with the Attending Physician, Dr. Cornelio Tnag.    Jennifer Patrick PA-C  [facility]    ______________________________________________________________________    Chief Complaint   \"Hand locking,\" nausea, vomiting    History is obtained from the patient and review of EMR.    History of Present Illness   Kyle Ayala is a 28 year old male who presented to the emergency department after heavy drinking 2 days prior to admission.     Patient consumed 7 small bottles of \"99% alcohol\" 2 days prior to admission, was drunk and started vomiting, and woke this morning feeling unwell. He estimates he vomited more than 20 times total, non bloody emesis. He woke this morning with his hands \"locking\" and his fingers were difficult to move. His vision was blurry, he was weak and shaky, and felt his heart racing. He has some central abdominal pain from all the vomiting.     He denies fever or chills. He reports dysuria.    He has a history of previous alcohol withdrawal. He smokes marijuana occasionally.    The remainder review of systems is negative.      Review of Systems    The 10 point Review of Systems is negative other than noted in the HPI or here.     Past Medical History    I have reviewed this patient's medical history and " updated it with pertinent information if needed.   Past Medical History:   Diagnosis Date     Acute renal failure (ARF) (H) 2/27/2016     Asthma        Past Surgical History   I have reviewed this patient's surgical history and updated it with pertinent information if needed.  Past Surgical History:   Procedure Laterality Date     LACERATION REPAIR Right     Right thigh, chain saw accident       Social History   I have reviewed this patient's social history and updated it with pertinent information if needed.  Social History     Tobacco Use     Smoking status: Current Every Day Smoker     Packs/day: 1.00     Types: Cigarettes     Smokeless tobacco: Never Used   Substance Use Topics     Alcohol use: Yes     Drug use: Yes     Types: Marijuana     Comment: marijuana occ.       Family History   I have reviewed this patient's family history and updated it with pertinent information if needed.   Family History   Problem Relation Age of Onset     Alcohol/Drug Mother      Dementia Mother      Asthma Father      Respiratory Father 54        COPD     Alcohol/Drug Father      Dementia Maternal Grandmother      Alzheimer Disease Maternal Grandfather         dementia     C.A.D. Paternal Grandfather      Heart Disease Brother         congenital heart, heart surgery when born     Alcohol/Drug Brother        Prior to Admission Medications   Prior to Admission Medications   Prescriptions Last Dose Informant Patient Reported? Taking?   albuterol (PROAIR HFA/PROVENTIL HFA/VENTOLIN HFA) 108 (90 Base) MCG/ACT inhaler Past Week at Unknown time  No Yes   Sig: Inhale 2 puffs into the lungs every 6 hours as needed for shortness of breath / dyspnea or wheezing   diazepam (VALIUM) 5 MG tablet 3/7/2019 at 0800  No Yes   Sig: Take 1 tablet (5 mg) by mouth daily as needed for anxiety or sleep      Facility-Administered Medications: None     Allergies   Allergies   Allergen Reactions     Penicillins      Patient says he was allergic to penicllin  as a child but not anymore       Physical Exam   Vital Signs: Temp: 98.3  F (36.8  C) Temp src: Oral BP: 119/73 Pulse: 88 Heart Rate: 86 Resp: 18 SpO2: 97 % O2 Device: None (Room air)    Weight: 150 lbs 9.19 oz    Constitutional: Alert, oriented, cooperative. Ill appearing and mildly uncomfortable and distressed, but appears nontoxic, speaking in full sentences.     Eyes: Eyes are clear, pupils are reactive. No scleral icterus. Extroccular movements intact.     HEENT: Oropharynx is clear and moist, no lesions. Normocephalic, no evidence of cranial trauma.      Cardiovascular: Regular rhythm and rate, normal S1 and S2. No murmur, rubs, or gallops. Peripheral pulses intact bilaterally. No lower extremity edema.    Respiratory: Lung sounds are clear to auscultation bilaterally without wheezes, rhonchi, or crackles.    GI: Soft, non-distended. Non-tender, no rebound or guarding. No hepatosplenomegaly or masses appreciated. Normal bowel sounds.     Musculoskeletal: Without obvious deformity, normal range of motion. Normal muscle bulk and tone. Distal CMS intact.      Skin: Warm and dry, no rashes or ecchymoses. No mottling of skin.      Neurologic: Patient moves all extremities. Cranial nerves are grossly intact.  is symmetric. Gross strength and sensation are equal bilaterally with the exception that dorsal left hand is slightly numb and tingly.    Genitourinary: Deferred      Data   Data reviewed today: I reviewed all medications, new labs and imaging results over the last 24 hours. I personally reviewed the EKG tracing showing normal sinus rhythm (the first EKG was reviewed but was poor quality, but repeat EKG with normal sinus rhythm).    Recent Labs   Lab 03/07/19  0902   WBC 18.9*   HGB 17.8*   MCV 86      *   POTASSIUM 2.8*   CHLORIDE 75*   CO2 36*   BUN 24   CR 2.00*   ANIONGAP 17*   SILVIANO 9.2   *   ALBUMIN 5.6*   PROTTOTAL 9.5*   BILITOTAL 1.2   ALKPHOS 89   ALT 32   AST 36   LIPASE 119      Recent Results (from the past 24 hour(s))   Chest XR,  PA & LAT    Narrative    CHEST TWO VIEWS  3/7/2019 10:20 AM     HISTORY:  Tachycardia.    COMPARISON: 10/28/2016.      Impression    IMPRESSION: No acute cardiopulmonary disease.    JORGE DAVIS MD

## 2019-03-07 NOTE — ED NOTES
Is a binge drinker, was taking 6-7 shots a day for 3-4 days, usually he doesn't withdrawal this bad. Has tried oral ativan 10mg this am with no help. He is in a lot of pain, with N/V. Last intake 2 days ago, has had visual and audio disturbances yesterday, non today. Today he has had sever tremors and is very diaphoretic. Is a/o x 4. Has not ate in 3 days. Had been sober for for a year prior to this binge. Has had some increased life stressors with mom being DX with dementia and brothers not helping.

## 2019-03-07 NOTE — ED NOTES
Patient has  [place] to Observation  order. Patient has been given Patient Bill of Rights, Observation brochure and  What does Observation mean to me  forms.  Patient has been given the opportunity to ask questions about observation status and their plan of care.     Bhumi Torres

## 2019-03-07 NOTE — ED PROVIDER NOTES
History     Chief Complaint   Patient presents with     Delirium Tremens (DTS)     ETOH withrdawl, pt is having extremity cramping, sweating, tachycardia, n/v, last drink 2 days ago.      HPI  Kyle Ayala is a 28 year old male who presents with binge drinking for 7 years. s/p [place] in 2018.  started drinking again 1 year ago. Binging since - at least 4-5 oz per time.  last alcohol 2 days ago.    tremors, fatigue, generalized weakness starting 4 am.  no syncope. palpitations.  No chest pain or shortness of breath.  vomiting and unable to hold down fluids.  no withdrawal seizures.    felt warm and chilled.     No meth or cocaine.  uses ativan prescribed for anxiety      Allergies:  Allergies   Allergen Reactions     Penicillins      Patient says he was allergic to penicllin as a child but not anymore       Problem List:    Patient Active Problem List    Diagnosis Date Noted     PAF (paroxysmal atrial fibrillation) (H) 02/29/2016     Priority: Medium     Non-rheumatic mitral regurgitation 02/29/2016     Priority: Medium     Echo 2/29/2016- Left ventricular systolic function is normal.The visual ejection fraction is estimated at 55-60%. The right ventricular systolic function is normal. There is moderate (2+) mitral regurgitation.The mitral regurgitant jet is eccentrically directed. Severity of the mitral regurgitation may be underestimated due to eccentric jet.       Acute hyperactive alcohol withdrawal delirium (H) 02/28/2016     Priority: Medium     Acute renal failure (ARF) (H) 02/27/2016     Priority: Medium     Dehydration 02/27/2016     Priority: Medium     Hypokalemia 02/27/2016     Priority: Medium     Hyponatremia 02/27/2016     Priority: Medium     Alcohol dependence (H) 02/27/2016     Priority: Medium     Mild major depression 01/08/2015     Priority: Medium     Mild intermittent asthma 01/07/2015     Priority: Medium     Health Care Home 12/11/2014     Priority: Medium     State Tier Level:     Status:  Unable to reach  Care Coordinator:  Jacki Fernandez    See Letters for [facility] Care Plan  Date:  March 9, 2016           Anxiety 11/21/2014     Priority: Medium     Newly recognized heart murmur 07/30/2014     Priority: Medium     Diagnosis updated by automated process. Provider to review and confirm.       Vomiting 07/30/2014     Priority: Medium     Loss of weight 05/21/2014     Priority: Medium     Tobacco abuse 05/21/2014     Priority: Medium        Past Medical History:    No past medical history on file.    Past Surgical History:    No past surgical history on file.    Family History:    Family History   Problem Relation Age of Onset     Alcohol/Drug Mother      Dementia Mother      Asthma Father      Respiratory Father 54        COPD     Alcohol/Drug Father      Dementia Maternal Grandmother      Alzheimer Disease Maternal Grandfather         dementia     C.A.D. Paternal Grandfather      Heart Disease Brother         congenital heart, heart surgery when born     Alcohol/Drug Brother        Social History:  Marital Status:  Single [1]  Social History     Tobacco Use     Smoking status: Current Every Day Smoker     Packs/day: 1.00     Types: Cigarettes     Smokeless tobacco: Never Used   Substance Use Topics     Alcohol use: Yes     Drug use: Yes     Types: Marijuana     Comment: marijuana occ.        Medications:      albuterol (PROAIR HFA/PROVENTIL HFA/VENTOLIN HFA) 108 (90 Base) MCG/ACT inhaler   diazepam (VALIUM) 5 MG tablet   fluticasone - vilanterol (BREO ELLIPTA) 100-25 MCG/INH oral inhaler   sertraline (ZOLOFT) 50 MG tablet         Review of Systems   Constitutional: Positive for chills, diaphoresis and fever.   HENT: Negative for ear pain, sinus pressure and sore throat.    Eyes: Negative for visual disturbance.   Respiratory: Negative for cough, shortness of breath and wheezing.    Cardiovascular: Positive for palpitations. Negative for chest pain.   Gastrointestinal: Positive for nausea and  vomiting. Negative for abdominal pain, blood in stool, constipation and diarrhea.   Genitourinary: Negative for dysuria, frequency and urgency.   Skin: Negative for rash.   Neurological: Positive for tremors and weakness. Negative for headaches.   All other systems reviewed and are negative.      Physical Exam   Pulse: 156  Temp: 98.1  F (36.7  C)  Resp: 18  Weight: 68 kg (150 lb)  SpO2: 98 %      Physical Exam   Constitutional: He appears distressed.   HENT:   Head: Atraumatic.   Eyes: Conjunctivae are normal.   Neck: Neck supple.   Cardiovascular: Normal rate and regular rhythm. Exam reveals no friction rub.   No murmur heard.  Pulmonary/Chest: Effort normal and breath sounds normal. No stridor. No respiratory distress. He has no wheezes.   Abdominal: Soft. Bowel sounds are normal. He exhibits no distension. There is no hepatosplenomegaly. There is no tenderness. There is no guarding and no CVA tenderness.   Musculoskeletal: He exhibits no edema.   Neurological: He exhibits normal muscle tone.   Skin: No rash noted. He is not diaphoretic. No pallor.       ED Course        Procedures                 EKG Interpretation:      Interpreted by Diego Garvey MD  EKG done at 0906 hrs. demonstrates a sinus rhythm at 123 bpm with a normal axis and no obvious ST change or T wave changes but the baseline is abnormal in its a difficult EKG to read.  R wave progression is normal and there are no Q waves.  Intervals normal.  Multiple PVCs.  Normal conduction.  Impression sinus rhythm 123 beats a minute.  Sinus tachycardia and ectopy and a poor baseline.  I compared this EKG to 10/28/2016.  That EKG demonstrates no significant change from the current EKG other than at that time he was in a sinus rhythm with normal rates.  There were generalized T wave flattening in the inferior and lateral leads.    EKG done at 1125 hrs. demonstrates a sinus rhythm at 93 bpm with a normal axis and no ST change.  No T wave changes other than a  biphasic T in leads II, 3 and aVF.  There is a normal R progression.  No Q waves.  Normal intervals normal conduction.  No ectopy.  Impression sinus rhythm 93 bpm and no significant change from prior EKG          Critical Care time:  none               Results for orders placed or performed during the hospital encounter of 03/07/19 (from the past 24 hour(s))   CBC with platelets, differential   Result Value Ref Range    WBC 18.9 (H) 4.0 - 11.0 10e9/L    RBC Count 5.80 4.4 - 5.9 10e12/L    Hemoglobin 17.8 (H) 13.3 - 17.7 g/dL    Hematocrit 49.9 40.0 - 53.0 %    MCV 86 78 - 100 fl    MCH 30.7 26.5 - 33.0 pg    MCHC 35.7 31.5 - 36.5 g/dL    RDW 12.5 10.0 - 15.0 %    Platelet Count 387 150 - 450 10e9/L    Diff Method PENDING    Comprehensive metabolic panel   Result Value Ref Range    Sodium 128 (L) 133 - 144 mmol/L    Potassium 2.8 (L) 3.4 - 5.3 mmol/L    Chloride 75 (L) 94 - 109 mmol/L    Carbon Dioxide 36 (H) 20 - 32 mmol/L    Anion Gap 17 (H) 3 - 14 mmol/L    Glucose 144 (H) 70 - 99 mg/dL    Urea Nitrogen 24 7 - 30 mg/dL    Creatinine 2.00 (H) 0.66 - 1.25 mg/dL    GFR Estimate 44 (L) >60 mL/min/[1.73_m2]    GFR Estimate If Black 51 (L) >60 mL/min/[1.73_m2]    Calcium 9.2 8.5 - 10.1 mg/dL    Bilirubin Total 1.2 0.2 - 1.3 mg/dL    Albumin 5.6 (H) 3.4 - 5.0 g/dL    Protein Total 9.5 (H) 6.8 - 8.8 g/dL    Alkaline Phosphatase 89 40 - 150 U/L    ALT 32 0 - 70 U/L    AST 36 0 - 45 U/L   Lipase   Result Value Ref Range    Lipase 119 73 - 393 U/L   Magnesium   Result Value Ref Range    Magnesium 2.2 1.6 - 2.3 mg/dL       Medications   0.9% sodium chloride BOLUS (1,000 mLs Intravenous New Bag 3/7/19 0942)   ondansetron (ZOFRAN) 2 MG/ML injection (not administered)   lactated ringers infusion ( Intravenous Stopped 3/7/19 0943)   sodium chloride 0.9 % 1,000 mL with INFUVITE ADULT 10 mL, thiamine 100 mg, folic acid 1 mg, magnesium sulfate 2 g infusion (not administered)   potassium chloride 10 mEq in 100 mL intermittent  infusion with 10 mg lidocaine (not administered)   ondansetron (ZOFRAN) injection 4 mg (4 mg Intravenous Given 3/7/19 0932)   LORazepam (ATIVAN) injection 1 mg (1 mg Intravenous Given 3/7/19 0917)       Assessments & Plan (with Medical Decision Making)     MDM: Kyle Ayala is a 28 year old male who presented with a history of alcohol abuse, mitral valve regurgitation prior alcohol withdrawal with acute kidney injury, asthma, major depression and anxiety disorder who has previously been through chemical dependency treatment at [facility] approximately 2 years ago.  He has been drinking since that time and primarily binge drinking of significant volumes.  He stopped his alcohol 2 days ago and presented here with a sinus tachycardia in the 150s and an elevated systolic blood pressure.  By the time I saw the patient after Ativan is heart rate and blood pressure had normalized minimal tremor..  No focal neurologic or cardiopulmonary findings.      He has been vomiting and this is resulted in likely hypomagnesemia, hypokalemia and acute kidney injury.  His potassium is 2.8 and his creatinine is 2.  Fluid were administered in the emergency department as well as a potassium protocol and given magnesium and thiamine as well.    I spoke with the patient and recommend that he remain in the hospital overnight, following his laboratory testing, keeping him on a CIWA protocol.  He responded quite well to benzodiazepine on protocol and required minimal dosing in the emergency department.  I therefore when I discussed with Dr. Murillo recommended a khan admission on observation.  I discussed this with the patient as well.         ED to Inpatient Handoff:    Discussed with Dr. Murillo  Patient accepted for Observation Stay  Pending studies include none  Code Status: Full Code           I have reviewed the nursing notes.    I have reviewed the findings, diagnosis, plan and need for follow up with the patient.          Medication List       There are no discharge medications for this visit.         Final diagnoses:   Alcohol withdrawal syndrome without complication (H)       3/7/2019   [facility] EMERGENCY DEPARTMENT     Diego Garvey MD  03/07/19 0370

## 2019-03-07 NOTE — PROGRESS NOTES
[facility] ADMISSION NOTE    Patient admitted to room 2309 at approximately 1400 via cart from emergency room. Patient was accompanied by transport tech.     Verbal SBAR report received from Jacki prior to patient arrival.     Patient ambulated to bed with stand-by assist. Patient alert and oriented X 3. Pain is controlled without any medications. 0-10 Pain Scale: 8. Admission vital signs: Blood pressure 119/73, pulse 88, temperature 98.3  F (36.8  C), temperature source Oral, resp. rate 18, weight 68 kg (150 lb), SpO2 97 %. Patient was oriented to plan of care, call light, bed controls, tv, telephone, bathroom and visiting hours.     Risk Assessment    The following safety risks were identified during admission: none. Yellow risk band applied: NO.     Skin Initial Assessment    This writer admitted this patient and completed a partial skin assessment and Velasquez score in the Adult PCS flowsheet. Appropriate interventions initiated as needed. Thighs, buttocks, perineum not assessed. Patient denies skin problems and declined full assessment.     Secondary skin check NOT completed by, patient declined.  Patient smokes 1 ppd and requests a nicotine patch. MD paged. Reports very slight nausea upon admission. Paged for as needed nausea medications as well.          Ernestine Browning

## 2019-03-08 VITALS
RESPIRATION RATE: 16 BRPM | SYSTOLIC BLOOD PRESSURE: 121 MMHG | TEMPERATURE: 98 F | OXYGEN SATURATION: 97 % | HEIGHT: 66 IN | DIASTOLIC BLOOD PRESSURE: 72 MMHG | WEIGHT: 137.13 LBS | HEART RATE: 72 BPM | BODY MASS INDEX: 22.04 KG/M2

## 2019-03-08 LAB
ALBUMIN UR-MCNC: 100 MG/DL
ANION GAP SERPL CALCULATED.3IONS-SCNC: 8 MMOL/L (ref 3–14)
ANION GAP SERPL CALCULATED.3IONS-SCNC: 8 MMOL/L (ref 3–14)
APPEARANCE UR: CLEAR
BILIRUB UR QL STRIP: NEGATIVE
BUN SERPL-MCNC: 23 MG/DL (ref 7–30)
BUN SERPL-MCNC: 24 MG/DL (ref 7–30)
CALCIUM SERPL-MCNC: 7.4 MG/DL (ref 8.5–10.1)
CALCIUM SERPL-MCNC: 8.2 MG/DL (ref 8.5–10.1)
CHLORIDE SERPL-SCNC: 97 MMOL/L (ref 94–109)
CHLORIDE SERPL-SCNC: 99 MMOL/L (ref 94–109)
CO2 SERPL-SCNC: 30 MMOL/L (ref 20–32)
CO2 SERPL-SCNC: 34 MMOL/L (ref 20–32)
COLOR UR AUTO: YELLOW
CREAT SERPL-MCNC: 1.77 MG/DL (ref 0.66–1.25)
CREAT SERPL-MCNC: 1.97 MG/DL (ref 0.66–1.25)
ERYTHROCYTE [DISTWIDTH] IN BLOOD BY AUTOMATED COUNT: 12.9 % (ref 10–15)
GFR SERPL CREATININE-BSD FRML MDRD: 45 ML/MIN/{1.73_M2}
GFR SERPL CREATININE-BSD FRML MDRD: 51 ML/MIN/{1.73_M2}
GLUCOSE SERPL-MCNC: 112 MG/DL (ref 70–99)
GLUCOSE SERPL-MCNC: 87 MG/DL (ref 70–99)
GLUCOSE UR STRIP-MCNC: NEGATIVE MG/DL
HCT VFR BLD AUTO: 40.9 % (ref 40–53)
HGB BLD-MCNC: 13.9 G/DL (ref 13.3–17.7)
HGB UR QL STRIP: NEGATIVE
KETONES UR STRIP-MCNC: 20 MG/DL
LEUKOCYTE ESTERASE UR QL STRIP: NEGATIVE
MCH RBC QN AUTO: 31.4 PG (ref 26.5–33)
MCHC RBC AUTO-ENTMCNC: 34 G/DL (ref 31.5–36.5)
MCV RBC AUTO: 93 FL (ref 78–100)
NITRATE UR QL: NEGATIVE
PH UR STRIP: 8 PH (ref 5–7)
PLATELET # BLD AUTO: 233 10E9/L (ref 150–450)
POTASSIUM SERPL-SCNC: 3.1 MMOL/L (ref 3.4–5.3)
POTASSIUM SERPL-SCNC: 3.2 MMOL/L (ref 3.4–5.3)
POTASSIUM SERPL-SCNC: 3.5 MMOL/L (ref 3.4–5.3)
RBC # BLD AUTO: 4.42 10E12/L (ref 4.4–5.9)
RBC #/AREA URNS AUTO: 2 /HPF (ref 0–2)
SODIUM SERPL-SCNC: 137 MMOL/L (ref 133–144)
SODIUM SERPL-SCNC: 139 MMOL/L (ref 133–144)
SOURCE: ABNORMAL
SP GR UR STRIP: 1.02 (ref 1–1.03)
UROBILINOGEN UR STRIP-MCNC: 2 MG/DL (ref 0–2)
WBC # BLD AUTO: 8.2 10E9/L (ref 4–11)
WBC #/AREA URNS AUTO: 2 /HPF (ref 0–5)

## 2019-03-08 PROCEDURE — 96375 TX/PRO/DX INJ NEW DRUG ADDON: CPT

## 2019-03-08 PROCEDURE — 25000128 H RX IP 250 OP 636: Performed by: FAMILY MEDICINE

## 2019-03-08 PROCEDURE — 84132 ASSAY OF SERUM POTASSIUM: CPT

## 2019-03-08 PROCEDURE — 99217 ZZC OBSERVATION CARE DISCHARGE: CPT | Performed by: FAMILY MEDICINE

## 2019-03-08 PROCEDURE — 96376 TX/PRO/DX INJ SAME DRUG ADON: CPT

## 2019-03-08 PROCEDURE — 25000132 ZZH RX MED GY IP 250 OP 250 PS 637: Performed by: FAMILY MEDICINE

## 2019-03-08 PROCEDURE — 25000128 H RX IP 250 OP 636: Performed by: PHYSICIAN ASSISTANT

## 2019-03-08 PROCEDURE — 80048 BASIC METABOLIC PNL TOTAL CA: CPT | Performed by: PHYSICIAN ASSISTANT

## 2019-03-08 PROCEDURE — 25800030 ZZH RX IP 258 OP 636: Performed by: FAMILY MEDICINE

## 2019-03-08 PROCEDURE — G0378 HOSPITAL OBSERVATION PER HR: HCPCS

## 2019-03-08 PROCEDURE — 25000131 ZZH RX MED GY IP 250 OP 636 PS 637: Performed by: PHYSICIAN ASSISTANT

## 2019-03-08 PROCEDURE — 36415 COLL VENOUS BLD VENIPUNCTURE: CPT | Performed by: FAMILY MEDICINE

## 2019-03-08 PROCEDURE — 36415 COLL VENOUS BLD VENIPUNCTURE: CPT | Performed by: PHYSICIAN ASSISTANT

## 2019-03-08 PROCEDURE — 80048 BASIC METABOLIC PNL TOTAL CA: CPT | Performed by: FAMILY MEDICINE

## 2019-03-08 PROCEDURE — 25000132 ZZH RX MED GY IP 250 OP 250 PS 637

## 2019-03-08 PROCEDURE — 36415 COLL VENOUS BLD VENIPUNCTURE: CPT

## 2019-03-08 PROCEDURE — 81001 URINALYSIS AUTO W/SCOPE: CPT | Performed by: PHYSICIAN ASSISTANT

## 2019-03-08 PROCEDURE — 85027 COMPLETE CBC AUTOMATED: CPT | Performed by: PHYSICIAN ASSISTANT

## 2019-03-08 RX ORDER — POTASSIUM CHLORIDE 1.5 G/1.58G
40 POWDER, FOR SOLUTION ORAL ONCE
Status: COMPLETED | OUTPATIENT
Start: 2019-03-08 | End: 2019-03-08

## 2019-03-08 RX ORDER — FOLIC ACID 1 MG/1
1 TABLET ORAL DAILY
Qty: 30 TABLET | Refills: 1 | Status: SHIPPED | OUTPATIENT
Start: 2019-03-08 | End: 2020-02-10

## 2019-03-08 RX ORDER — MULTIPLE VITAMINS W/ MINERALS TAB 9MG-400MCG
1 TAB ORAL DAILY
COMMUNITY
Start: 2019-03-08 | End: 2020-02-10

## 2019-03-08 RX ORDER — LANOLIN ALCOHOL/MO/W.PET/CERES
100 CREAM (GRAM) TOPICAL DAILY
Qty: 5 TABLET | Refills: 0 | Status: SHIPPED | OUTPATIENT
Start: 2019-03-08 | End: 2020-02-10

## 2019-03-08 RX ADMIN — MULTIPLE VITAMINS W/ MINERALS TAB 1 TABLET: TAB at 09:05

## 2019-03-08 RX ADMIN — Medication 10 MEQ: at 06:41

## 2019-03-08 RX ADMIN — LORAZEPAM 1 MG: 1 TABLET ORAL at 00:45

## 2019-03-08 RX ADMIN — ONDANSETRON 4 MG: 4 TABLET, ORALLY DISINTEGRATING ORAL at 05:32

## 2019-03-08 RX ADMIN — PROCHLORPERAZINE EDISYLATE 10 MG: 5 INJECTION INTRAMUSCULAR; INTRAVENOUS at 00:58

## 2019-03-08 RX ADMIN — SODIUM CHLORIDE, POTASSIUM CHLORIDE, SODIUM LACTATE AND CALCIUM CHLORIDE: 600; 310; 30; 20 INJECTION, SOLUTION INTRAVENOUS at 02:00

## 2019-03-08 RX ADMIN — FOLIC ACID 1 MG: 1 TABLET ORAL at 09:07

## 2019-03-08 RX ADMIN — Medication 100 MG: at 09:07

## 2019-03-08 RX ADMIN — POTASSIUM CHLORIDE 40 MEQ: 1500 TABLET, EXTENDED RELEASE ORAL at 00:48

## 2019-03-08 RX ADMIN — POTASSIUM CHLORIDE 40 MEQ: 1.5 POWDER, FOR SOLUTION ORAL at 09:04

## 2019-03-08 RX ADMIN — LORAZEPAM 1 MG: 2 INJECTION INTRAMUSCULAR; INTRAVENOUS at 02:10

## 2019-03-08 NOTE — DISCHARGE INSTRUCTIONS
Behavioral/Mental Health Resources  [place], [place], [facility], [place], [place], [place], [place], [place] and Replaced by [facility]    **Patient should check with their health insurance to identify providers in network**    Crisis Lines:   Text 990130 from anywhere in the [place] to text with a trained Crisis Counselor   -[place]: [place] Crisis Connection: (803) 709-9015/1-331.945.1484   -[place]: [phone]    -[facility]/ [place]/ [place]/ [place]/ [place]: [phone]   -[facility]: [place] [facility] Crisis: [phone]   -[facility]: [place] Crisis Team [phone] or  [phone]     Call the [employer] Suicide Prevention Lifeline at 7-924-511-KCRQ (2340) to be connected to a  counselor at a crisis center in your area if you, a family member, or friend are experiencing   thoughts of suicide. The call is FREE, confidential, and always available.       National [place] on Mental Illness of [place] ([facility]) provides support groups and  educational programs. Visit www.namihelps.org or call the [facility] Helpline at 1-556.509.1470  Or 284-916-0100 for further information.       Crisis Mobile Serivces:   -[place]: [place] [phone]   -[facility]/ [place]/ [place]/ [place]/ [place]: [phone]   -[facility]: [place] [phone]   -[facility]: [phone] or [phone]    Chemical Dependency Detox:  - [place] [facility]:  [phone] - can ask for other recommendations  - [facility]/[place]([facility]):  [phone]  - [facility] [place] [facility]: [phone] [facility]  - [facility] ([facility]):  [phone]  - [facility] : [phone] [facility]  - [facility]):  [phone]  - [place] ([facility]):  [phone]    Chemical Dependency Assessment:   - [place] [facility]: [phone]   - Behavioral Health Providers, can help find a provider near you:  [phone]  - No insurance- call [facility] of  residence and ask about applying for a Rule 25    Counseling/psychotherapy:  - [facility] - [place],/[place]/ [facility]/[place] /other locations: [phone]  - Behavioral Healthcare Providers- Can help find a provider near you:  [phone]  - [facility]-[place]:  [phone]  - [facility]- [place]:  [phone]  - [facility]- [facility]/[place]:  [phone]  - [facility]-[place]: [phone]  - [facility]- [place]/[facility]/other locations:  [phone]  - [facility]- [facility]/[place]:  [phone]  - [facility] - [facility]:  [phone]  - [facility] - [place]:  [phone]  - [facility]- [place]-based in [place]:  [phone]  - [phone]  - [facility]- [place]: [phone]  - [facility]- [facility]:  [phone]  - [facility]- [place] [phone]  - [facility] located in [place] (not affiliated with [place]): [phone]  - [facility]- [place]:  [phone]/[place]: [phone] and other locations.  - [facility]- [place]: [phone]  - [facility]- [place]:  [phone]  - [facility]- [facility]/[place]: [phone]  - [facility] (Free counseling services)- [facility]: [phone]     Psychiatry/ Mental Health Medication management:  - [facility]- [place],/[place]/[facility]/ [place]/ multiple locations: [phone]  - Behavioral Healthcare Providers- Can help find a provider near you, if you have preferred one or [facility] they can identify who is in network and assist with scheduling an appointment:  [phone]  - [facility]:  [place]/[facility] locations : [phone]  - [facility] - Dr Rob Carney and other associates. Collaborative model  with PCP involvement:  [phone] (requires PCP referral specifically)  - [facility]- [place]:  [phone]  - [facility]: [place]:   [phone] (Requires individual to be engaged in counseling)  - [facility]- [place]: [phone] [place]/[facility]:  [phone]/[place]:  [phone]/ [place]:  [phone]  - [facility]- [place]:   [phone]  - [facility]- [place], -[phone]  - [facility] [[email or web address]] Psychiatry - Medical students who rotate yearly:  [phone]    County Numbers  - [facility]: [phone]  - [facility]: [phone]  - [facility]: [phone]  - [facility] : [phone]  - [facility]: [phone]  - [facility]: [phone]  - [place]: [phone]  - [facility]: [phone]  - [facility]: [phone]  - [facility]: [phone]    **Please note that this list does not include all agencies that provide services**    Care Transitions Team at [facility] [phone]

## 2019-03-08 NOTE — PROGRESS NOTES
MIMI PASCALG DISCHARGE NOTE    Patient discharged to home at 2:00 PM via ambulation. Accompanied by significant other and mother and staff. Discharge instructions reviewed with patient, opportunity offered to ask questions. Prescriptions sent to patients preferred pharmacy. All belongings sent with patient.    Sherice Peguero

## 2019-03-08 NOTE — PLAN OF CARE
Pt alert oriented and pleasant. Up with SBA, voiding without difficulty. CIWA scores improving last 3. Given ativan per protocol. Potassium protocol given pt had self reported emesis following oral K supplement so given PRN compazine and IV ativan. Ambulated in hallway with SBA.

## 2019-03-08 NOTE — PLAN OF CARE
"CiWa scores 2 and 3.   Patient up ambulating in halls and showered independently, reports feeling much better today.  Drinking fluids and small amount of solids. Variety of food choices offered.   Patient states he just doesn't \"have appetite back quite yet.\"  Potassium replaced per MD instructions. Recheck at noon.  "

## 2019-03-08 NOTE — PHARMACY - DISCHARGE MEDICATION RECONCILIATION
Discharge medication review for this patient is complete. Pharmacist assisted with medication reconciliation of discharge medications with prior to admission medications.     The following changes were made to the discharge medication list based on pharmacist review:  Added:  Folic acid, multivitamin and thiamine.  Discontinued: diazepam  Changed: n/a      Patient's Discharge Medication List  - medications as listed on After Visit Summary (AVS)     Review of your medicines      START taking      Dose / Directions   folic acid 1 MG tablet  Commonly known as:  FOLVITE  Used for:  Alcohol withdrawal syndrome without complication (H)      Dose:  1 mg  Take 1 tablet (1 mg) by mouth daily  Quantity:  30 tablet  Refills:  1     multivitamin w/minerals tablet      Dose:  1 tablet  Take 1 tablet by mouth daily  Refills:  0     vitamin B1 100 MG tablet  Commonly known as:  THIAMINE  Used for:  Alcohol withdrawal syndrome without complication (H)      Dose:  100 mg  Take 1 tablet (100 mg) by mouth daily  Quantity:  5 tablet  Refills:  0        CONTINUE these medicines which have NOT CHANGED      Dose / Directions   albuterol 108 (90 Base) MCG/ACT inhaler  Commonly known as:  PROAIR HFA/PROVENTIL HFA/VENTOLIN HFA  Used for:  Moderate persistent asthma without complication      Dose:  2 puff  Inhale 2 puffs into the lungs every 6 hours as needed for shortness of breath / dyspnea or wheezing  Quantity:  1 Inhaler  Refills:  0        STOP taking    diazepam 5 MG tablet  Commonly known as:  VALIUM              Where to get your medicines      These medications were sent to [facility] [place], [place] - [address]  [address] [address]    Phone:  [phone]     folic acid 1 MG tablet    vitamin B1 100 MG tablet         Sandip Elliott  PD4 Student

## 2019-03-08 NOTE — DISCHARGE SUMMARY
[facility] Discharge Summary    Kyle Ayala MRN# [id number]   Age: 28 year old YOB: 1990     Date of Admission:  3/7/2019  Date of Discharge::  3/8/2019  Admitting Physician:  Mj Murillo MD  Discharge Physician:  Cornelio Tang MD, MD             Admission Diagnoses:   Alcohol withdrawal syndrome without complication (H) [F10.230]          Principle Discharge Diagnosis:     Alcohol withdrawal  Alcohol abuse/dependence    See hospital course for further active diagnoses addressed during this admission.            Procedures:   No procedures performed during this admission          Medications Prior to Admission:     Medications Prior to Admission   Medication Sig Dispense Refill Last Dose     albuterol (PROAIR HFA/PROVENTIL HFA/VENTOLIN HFA) 108 (90 Base) MCG/ACT inhaler Inhale 2 puffs into the lungs every 6 hours as needed for shortness of breath / dyspnea or wheezing 1 Inhaler 0 Past Week at Unknown time     diazepam (VALIUM) 5 MG tablet Take 1 tablet (5 mg) by mouth daily as needed for anxiety or sleep 10 tablet 1 3/7/2019 at 0800             Discharge Medications:     Current Discharge Medication List      START taking these medications    Details   folic acid (FOLVITE) 1 MG tablet Take 1 tablet (1 mg) by mouth daily  Qty: 30 tablet, Refills: 1    Associated Diagnoses: Alcohol withdrawal syndrome without complication (H)      multivitamin w/minerals (THERA-VIT-M) tablet Take 1 tablet by mouth daily      vitamin B1 (THIAMINE) 100 MG tablet Take 1 tablet (100 mg) by mouth daily  Qty: 5 tablet, Refills: 0    Associated Diagnoses: Alcohol withdrawal syndrome without complication (H)         CONTINUE these medications which have NOT CHANGED    Details   albuterol (PROAIR HFA/PROVENTIL HFA/VENTOLIN HFA) 108 (90 Base) MCG/ACT inhaler Inhale 2 puffs into the lungs every 6 hours as needed for shortness of breath / dyspnea or wheezing  Qty: 1 Inhaler, Refills: 0    Associated Diagnoses:  "Moderate persistent asthma without complication         STOP taking these medications       diazepam (VALIUM) 5 MG tablet Comments:   Reason for Stopping:                     Consultations:   No consultations were requested during this admission          Brief History of Illness:     From Admission H+P:   Kyle Ayala is a 28 year old male who presented to the emergency department after heavy drinking 2 days prior to admission.      Patient consumed 7 small bottles of \"99% alcohol\" 2 days prior to admission, was drunk and started vomiting, and woke this morning feeling unwell. He estimates he vomited more than 20 times total, non bloody emesis. He woke this morning with his hands \"locking\" and his fingers were difficult to move. His vision was blurry, he was weak and shaky, and felt his heart racing. He has some central abdominal pain from all the vomiting.      He denies fever or chills. He reports dysuria.     He has a history of previous alcohol withdrawal. He smokes marijuana occasionally.     The remainder review of systems is negative.                     TODAY:     Subjective:  Feels much improved this AM.  No pain.  No achiness.  Says he feels clear and not shaky.  Nausea better.  No fever or chills.  Says he just \"fell off the wagon\" and has good support and resources for remaining abstinent including being tied in to AA and does not want/need further resources/assistance with quitting drinking.  Says he only takes the valium when he gets really achy like he did with drinking and not eating for 2 days - discussed that when drinking heavily is the worse possible time for him to use the valium.  Patient taking orals ok, feels ready for discharge today.   No other pain.  No further nausea and has not needed any zofran in past 7 hours prior to discharge.       ROS:   ROS: 10 point ROS neg other than the symptoms noted above in the HPI.       /72   Pulse 72   Temp 98  F (36.7  C) (Oral)   Resp 16   Ht " "1.676 m (5' 6\")   Wt 62.2 kg (137 lb 2 oz)   SpO2 97%   BMI 22.13 kg/m     EXAM:  General: awake and alert, NAD, oriented x 3  Head: normocephalic  Neck: unremarkable, no lymphadenopathy   HEENT: oropharynx pink and moist    Heart: Regular rate and rhythm, no murmurs, rubs, or gallops  Lungs: clear to auscultation bilaterally with good air movement throughout  Abdomen: soft, non-tender, no masses or organomegaly  Extremities: no edema in lower extremities   Skin unremarkable.            Hospital Course:       Kyle Ayala is a 28 year old male admitted on 3/7/2019. He has a past medical history significant for asthma, anxiety, alcohol dependence with withdrawal, and tobacco dependence who presented to the emergency department for evaluation of \"hand locking,\" palpitations, and excessive vomiting after heavy alcohol use.        Alcohol withdrawal  Alcohol abuse/dependence  3/7/19 -- Presents with alcohol level < 0.01. Last drink 2 days prior to admission. History of previous withdrawal, no known history of seizure or need for intubation. Was started on CIWA in the emergency department.  - CIWA protocol with ativan, Daily oral vitamins, Prn antiemetics  3/8/2019 -- did well, just mild withdrawal, ok for discharge today - resources given but patient did not want further assistance - says planning to resume sobriety and has connections to  and all the support he needs.        Leukocytosis - stress response, no infection  3/7/19 -- Admit WBC 18.9 with left shift, afebrile upon admission. Chest x-ray unremarkable. Reported dysuria. Possible UTI or other infection vs hemoconcentration.  - No antibiotics unless he spikes a fever or UA positive  3/8/2019 -- UA negative, WBC normalized without antibiotics         Acute kidney failure (H) due to Dehydration in turn due to alcohol abuse/withdrawal and emesis   .3/7/19 -- Admit creatinine 2.00 (baseline 0.77), GFR 44 (> 90). Likely prerenal due to dehydration. Was given 1L " NS in the emergency department.  - LR @ 125  3/8/2019 -- down to 1.77 at time of discharge with good oral intake.  Consider repeating BMP at follow-up with primary care provider to confirm normalized.           Metabolic alkalosis - anion gap initially elevated, normalized with rehydration   3/7/19 -- Anion gap of 17, CO2 36 on BMP, no associated VBG to go with it. Likely due to dehydration and vomiting, expect to improve with IV fluids.  3/8/2019 -- VBG showed alkalosis - carbon dioxide improved and anion gap normalized this AM. No further intervention.       Hypokalemia  3/7/19 -- Admit K 2.8. Likely due to losses from vomiting/poor intake.  Replace.  3/8/2019 -- normalized with replacement and should remain so with resumption of normal oral intake on discharge.        Hyponatremia  3/7/19 -- Admit Na 128. Likely due to dehydration. Was given 1L NS in the emergency department.  - Rehydrate with LR @ 125 ml/hr  3/8/2019 -- normalized.  Not low enough to be at risk for over-correction.         Non-rheumatic mitral regurgitation  Noted per problem list. Echo in 2016 with EF 55-60%, moderate mitral regurgitation.        Mild intermittent asthma  3/7/19 -- Stable respiratory status upon admission. Managed prior to admission with Breo 100/25.  - Hold prior to admission Breo  - Prn albuterol available  3/8/2019 -- asymptomatic, at baseline, continue prior to admission medications.       Mild major depression  Anxiety  3/7/19 -- Managed prior to admission with prn Valium. Hold prior to admission Valium while on CIWA  3/8/2019 --patient reports only taking this when he's achy after drinking- discussed that taking it concomitantly with alcohol or intoxication is the worst possible time - overall I discussed this with Ty and recommended that he stop the valium altogether since it sounds like he isn't taking it for anxiety regardless and since it is not a good pick regardless with his ongoing alcohol abuse at present.   Recommend follow-up with primary care provider to reassess baseline anxiety/depression without this.  Currently appears stable with no suicidal ideation and no report of persistent anxiety today.      Tobacco abuse  Encourage cessation. Nicotine patch available.  Not interested in help/prescriptions.       Marijuana abuse  3/8/2019 -- smokes occasionally, urine drug screen positive.  Recommend quitting, not interested in further help.       Code Status: Full Code                        Discharge Instructions and Follow-Up:   Discharge diet: Regular   Discharge activity: Activity as tolerated   Discharge follow-up: Follow up with primary care provider in 7 days, consider repeating BMP at that time to confirm normalization of renal function and stable potassium.              Discharge Disposition:   Discharged to home      Attestation:  I have reviewed today's vital signs, notes, medications, labs and imaging.  Amount of time performed on this discharge summary: 50 minutes.    Cornelio Tang MD, MD

## 2019-03-08 NOTE — PROGRESS NOTES
ILEANA note. This writer reviewed chart, provided pt with behavioral health resources in his DC instructions. No other CTS needs identified at this time, if needs arise please contact CTS dept. MOISES Perdue, [facility] [phone]      ILEANA addendum:Per financial counselor: Kyle Ayala in room #8947 is MA pending.  Completed [employer] ann. and faxed to [facility].MOISES Perdue, [facility] [phone]

## 2019-03-11 ENCOUNTER — TELEPHONE (OUTPATIENT)
Dept: FAMILY MEDICINE | Facility: CLINIC | Age: 29
End: 2019-03-11

## 2019-03-11 NOTE — TELEPHONE ENCOUNTER
ED / Discharge Outreach Protocol    Patient Contact    Attempt # 1    Was call answered?  No.  Left message on voicemail with information to call me back. Veronica Barber RN

## 2019-03-12 NOTE — TELEPHONE ENCOUNTER
ED / Discharge Outreach Protocol    Patient Contact    Attempt # 2    Was call answered?  No.  Left message on voicemail with information to call back.    I do see he has an appt tomorrow in the clinic with Marco Antonio Arboleda RNC

## 2019-03-13 NOTE — TELEPHONE ENCOUNTER
ED / Discharge Outreach Protocol    Patient Contact    Attempt # 3    Was call answered?  No.  Left message on voicemail with information to call me back. Ruba KHOURY RN

## 2019-03-26 ENCOUNTER — HOSPITAL ENCOUNTER (EMERGENCY)
Facility: CLINIC | Age: 29
Discharge: HOME | End: 2019-03-26
Attending: NURSE PRACTITIONER | Admitting: NURSE PRACTITIONER
Payer: MEDICAID

## 2019-03-26 VITALS
TEMPERATURE: 97 F | OXYGEN SATURATION: 98 % | DIASTOLIC BLOOD PRESSURE: 108 MMHG | RESPIRATION RATE: 24 BRPM | HEART RATE: 95 BPM | SYSTOLIC BLOOD PRESSURE: 134 MMHG

## 2019-03-26 DIAGNOSIS — F10.929 ALCOHOL INTOXICATION (H): ICD-10-CM

## 2019-03-26 DIAGNOSIS — F10.930 ALCOHOL WITHDRAWAL SYNDROME WITHOUT COMPLICATION (H): ICD-10-CM

## 2019-03-26 LAB
ALBUMIN SERPL-MCNC: 5.2 G/DL (ref 3.4–5)
ALP SERPL-CCNC: 77 U/L (ref 40–150)
ALT SERPL W P-5'-P-CCNC: 34 U/L (ref 0–70)
AMPHETAMINES UR QL SCN: NEGATIVE
ANION GAP SERPL CALCULATED.3IONS-SCNC: 23 MMOL/L (ref 3–14)
AST SERPL W P-5'-P-CCNC: 37 U/L (ref 0–45)
BARBITURATES UR QL: NEGATIVE
BASOPHILS # BLD AUTO: 0 10E9/L (ref 0–0.2)
BASOPHILS NFR BLD AUTO: 0 %
BENZODIAZ UR QL: NEGATIVE
BILIRUB SERPL-MCNC: 1.4 MG/DL (ref 0.2–1.3)
BUN SERPL-MCNC: 18 MG/DL (ref 7–30)
CALCIUM SERPL-MCNC: 9.8 MG/DL (ref 8.5–10.1)
CANNABINOIDS UR QL SCN: POSITIVE
CHLORIDE SERPL-SCNC: 82 MMOL/L (ref 94–109)
CO2 SERPL-SCNC: 28 MMOL/L (ref 20–32)
COCAINE UR QL: NEGATIVE
CREAT SERPL-MCNC: 0.99 MG/DL (ref 0.66–1.25)
DIFFERENTIAL METHOD BLD: ABNORMAL
EOSINOPHIL # BLD AUTO: 0.2 10E9/L (ref 0–0.7)
EOSINOPHIL NFR BLD AUTO: 1 %
ERYTHROCYTE [DISTWIDTH] IN BLOOD BY AUTOMATED COUNT: 12.4 % (ref 10–15)
ETHANOL SERPL-MCNC: <0.01 G/DL
GFR SERPL CREATININE-BSD FRML MDRD: >90 ML/MIN/{1.73_M2}
GLUCOSE SERPL-MCNC: 196 MG/DL (ref 70–99)
HCT VFR BLD AUTO: 46 % (ref 40–53)
HGB BLD-MCNC: 16 G/DL (ref 13.3–17.7)
LIPASE SERPL-CCNC: 158 U/L (ref 73–393)
LYMPHOCYTES # BLD AUTO: 2 10E9/L (ref 0.8–5.3)
LYMPHOCYTES NFR BLD AUTO: 9 %
MCH RBC QN AUTO: 30.7 PG (ref 26.5–33)
MCHC RBC AUTO-ENTMCNC: 34.8 G/DL (ref 31.5–36.5)
MCV RBC AUTO: 88 FL (ref 78–100)
MONOCYTES # BLD AUTO: 1.3 10E9/L (ref 0–1.3)
MONOCYTES NFR BLD AUTO: 6 %
NEUTROPHILS # BLD AUTO: 18.6 10E9/L (ref 1.6–8.3)
NEUTROPHILS NFR BLD AUTO: 84 %
OPIATES UR QL SCN: NEGATIVE
PCP UR QL SCN: NEGATIVE
PLATELET # BLD AUTO: 456 10E9/L (ref 150–450)
PLATELET # BLD EST: ABNORMAL 10*3/UL
POTASSIUM SERPL-SCNC: 3 MMOL/L (ref 3.4–5.3)
PROT SERPL-MCNC: 9 G/DL (ref 6.8–8.8)
RBC # BLD AUTO: 5.22 10E12/L (ref 4.4–5.9)
RBC MORPH BLD: NORMAL
SODIUM SERPL-SCNC: 133 MMOL/L (ref 133–144)
TROPONIN I SERPL-MCNC: <0.015 UG/L (ref 0–0.04)
WBC # BLD AUTO: 22.1 10E9/L (ref 4–11)

## 2019-03-26 PROCEDURE — 96375 TX/PRO/DX INJ NEW DRUG ADDON: CPT

## 2019-03-26 PROCEDURE — 96376 TX/PRO/DX INJ SAME DRUG ADON: CPT

## 2019-03-26 PROCEDURE — 83690 ASSAY OF LIPASE: CPT | Performed by: NURSE PRACTITIONER

## 2019-03-26 PROCEDURE — 80307 DRUG TEST PRSMV CHEM ANLYZR: CPT | Performed by: NURSE PRACTITIONER

## 2019-03-26 PROCEDURE — 93005 ELECTROCARDIOGRAM TRACING: CPT

## 2019-03-26 PROCEDURE — 80320 DRUG SCREEN QUANTALCOHOLS: CPT | Performed by: FAMILY MEDICINE

## 2019-03-26 PROCEDURE — 93010 ELECTROCARDIOGRAM REPORT: CPT | Mod: Z6 | Performed by: NURSE PRACTITIONER

## 2019-03-26 PROCEDURE — 99285 EMERGENCY DEPT VISIT HI MDM: CPT | Mod: 25

## 2019-03-26 PROCEDURE — 25800030 ZZH RX IP 258 OP 636: Performed by: FAMILY MEDICINE

## 2019-03-26 PROCEDURE — 96365 THER/PROPH/DIAG IV INF INIT: CPT

## 2019-03-26 PROCEDURE — 84484 ASSAY OF TROPONIN QUANT: CPT | Performed by: NURSE PRACTITIONER

## 2019-03-26 PROCEDURE — 80053 COMPREHEN METABOLIC PANEL: CPT | Performed by: FAMILY MEDICINE

## 2019-03-26 PROCEDURE — 99284 EMERGENCY DEPT VISIT MOD MDM: CPT | Mod: 25 | Performed by: NURSE PRACTITIONER

## 2019-03-26 PROCEDURE — C9113 INJ PANTOPRAZOLE SODIUM, VIA: HCPCS | Performed by: NURSE PRACTITIONER

## 2019-03-26 PROCEDURE — 85025 COMPLETE CBC W/AUTO DIFF WBC: CPT | Performed by: FAMILY MEDICINE

## 2019-03-26 PROCEDURE — 96361 HYDRATE IV INFUSION ADD-ON: CPT

## 2019-03-26 PROCEDURE — 25000128 H RX IP 250 OP 636: Performed by: NURSE PRACTITIONER

## 2019-03-26 RX ORDER — LORAZEPAM 2 MG/ML
0.5 INJECTION INTRAMUSCULAR ONCE
Status: COMPLETED | OUTPATIENT
Start: 2019-03-26 | End: 2019-03-26

## 2019-03-26 RX ORDER — SODIUM CHLORIDE, SODIUM LACTATE, POTASSIUM CHLORIDE, CALCIUM CHLORIDE 600; 310; 30; 20 MG/100ML; MG/100ML; MG/100ML; MG/100ML
1000 INJECTION, SOLUTION INTRAVENOUS CONTINUOUS
Status: DISCONTINUED | OUTPATIENT
Start: 2019-03-26 | End: 2019-03-26 | Stop reason: HOSPADM

## 2019-03-26 RX ORDER — ONDANSETRON 2 MG/ML
4 INJECTION INTRAMUSCULAR; INTRAVENOUS EVERY 30 MIN PRN
Status: DISCONTINUED | OUTPATIENT
Start: 2019-03-26 | End: 2019-03-26 | Stop reason: HOSPADM

## 2019-03-26 RX ORDER — POTASSIUM CL/LIDO/0.9 % NACL 10MEQ/0.1L
10 INTRAVENOUS SOLUTION, PIGGYBACK (ML) INTRAVENOUS ONCE
Status: COMPLETED | OUTPATIENT
Start: 2019-03-26 | End: 2019-03-26

## 2019-03-26 RX ADMIN — PANTOPRAZOLE SODIUM 40 MG: 40 INJECTION, POWDER, FOR SOLUTION INTRAVENOUS at 12:40

## 2019-03-26 RX ADMIN — Medication 10 MEQ: at 13:13

## 2019-03-26 RX ADMIN — LORAZEPAM 0.5 MG: 2 INJECTION INTRAMUSCULAR; INTRAVENOUS at 11:46

## 2019-03-26 RX ADMIN — SODIUM CHLORIDE, POTASSIUM CHLORIDE, SODIUM LACTATE AND CALCIUM CHLORIDE 1000 ML: 600; 310; 30; 20 INJECTION, SOLUTION INTRAVENOUS at 12:25

## 2019-03-26 RX ADMIN — ONDANSETRON 4 MG: 2 INJECTION INTRAMUSCULAR; INTRAVENOUS at 11:45

## 2019-03-26 RX ADMIN — SODIUM CHLORIDE, POTASSIUM CHLORIDE, SODIUM LACTATE AND CALCIUM CHLORIDE 1000 ML: 600; 310; 30; 20 INJECTION, SOLUTION INTRAVENOUS at 11:21

## 2019-03-26 RX ADMIN — LORAZEPAM 0.5 MG: 2 INJECTION INTRAMUSCULAR; INTRAVENOUS at 12:22

## 2019-03-26 ASSESSMENT — ENCOUNTER SYMPTOMS
SORE THROAT: 0
EYE DISCHARGE: 0
FATIGUE: 0
SHORTNESS OF BREATH: 1
MYALGIAS: 1
EYE REDNESS: 0
HEADACHES: 0
SEIZURES: 0
NECK STIFFNESS: 0
APPETITE CHANGE: 0
DIFFICULTY URINATING: 0
FEVER: 0
CHEST TIGHTNESS: 1
CHILLS: 0
VOMITING: 1
SPEECH DIFFICULTY: 0
RHINORRHEA: 0
SINUS PAIN: 0
ABDOMINAL PAIN: 1
CONFUSION: 0
COUGH: 0
DIARRHEA: 0
BLOOD IN STOOL: 0
DIZZINESS: 0
WHEEZING: 0
ACTIVITY CHANGE: 0
DIAPHORESIS: 0

## 2019-03-26 NOTE — ED PROVIDER NOTES
History     Chief Complaint   Patient presents with     Alcohol Problem     drinking since Wednesday-last drink [id number] yesterday     HPI    Kyle Ayala is a 28 year old male who presented to the emergency department after heavy drinking since this past Thursday following finding his brother  from hanging himself.  Patient reports he has been drinking several shots daily since then (Thursday) until yesterday at 12:30 PM of which he reports was his last drink.   Patient reports onset of vomiting this morning but denies hematemesis.    PT reports heaviness in chest, chest tightness, shortness of air, muscle cramping in his hands and arms, generalized weakness and shakiness, abdominal pain, generalized due to vomiting.      Patient denies dysuria, difficulty stooling, constipation, diarrhea, suicidal ideations, thoughts, plans.     He has a history of previous alcohol withdrawal. He smokes marijuana and last usage was this am.    His girlfriend is present with him and brought him to the ED today.    Allergies:  Allergies   Allergen Reactions     Penicillins      Patient says he was allergic to penicllin as a child but not anymore       Problem List:    Patient Active Problem List    Diagnosis Date Noted     Alcohol withdrawal (H) 2019     Priority: Medium     Acute kidney failure (H) 2019     Priority: Medium     Metabolic acidosis, increased anion gap 2019     Priority: Medium     PAF (paroxysmal atrial fibrillation) (H) 2016     Priority: Medium     Non-rheumatic mitral regurgitation 2016     Priority: Medium     Echo 2016- Left ventricular systolic function is normal.The visual ejection fraction is estimated at 55-60%. The right ventricular systolic function is normal. There is moderate (2+) mitral regurgitation.The mitral regurgitant jet is eccentrically directed. Severity of the mitral regurgitation may be underestimated due to eccentric jet.       Acute hyperactive  alcohol withdrawal delirium (H) 02/28/2016     Priority: Medium     Dehydration 02/27/2016     Priority: Medium     Hypokalemia 02/27/2016     Priority: Medium     Hyponatremia 02/27/2016     Priority: Medium     Alcohol dependence (H) 02/27/2016     Priority: Medium     Mild major depression 01/08/2015     Priority: Medium     Mild intermittent asthma 01/07/2015     Priority: Medium     Health Care Home 12/11/2014     Priority: Medium     State Tier Level:    Status:  Unable to reach  Care Coordinator:  Jacki Fernandez    See Letters for [facility] Care Plan  Date:  March 9, 2016           Anxiety 11/21/2014     Priority: Medium     Newly recognized heart murmur 07/30/2014     Priority: Medium     Diagnosis updated by automated process. Provider to review and confirm.       Vomiting 07/30/2014     Priority: Medium     Loss of weight 05/21/2014     Priority: Medium     Tobacco abuse 05/21/2014     Priority: Medium        Past Medical History:    Past Medical History:   Diagnosis Date     Acute renal failure (ARF) (H) 2/27/2016     Asthma        Past Surgical History:    Past Surgical History:   Procedure Laterality Date     LACERATION REPAIR Right     Right thigh, chain saw accident       Family History:    Family History   Problem Relation Age of Onset     Alcohol/Drug Mother      Dementia Mother      Asthma Father      Respiratory Father 54        COPD     Alcohol/Drug Father      Dementia Maternal Grandmother      Alzheimer Disease Maternal Grandfather         dementia     C.A.D. Paternal Grandfather      Heart Disease Brother         congenital heart, heart surgery when born     Alcohol/Drug Brother        Social History:  Marital Status:  Single [1]  Social History     Tobacco Use     Smoking status: Current Every Day Smoker     Packs/day: 1.00     Types: Cigarettes     Smokeless tobacco: Never Used   Substance Use Topics     Alcohol use: Yes     Drug use: Yes     Types: Marijuana     Comment: marijuana occ.         Medications:      albuterol (PROAIR HFA/PROVENTIL HFA/VENTOLIN HFA) 108 (90 Base) MCG/ACT inhaler   folic acid (FOLVITE) 1 MG tablet   multivitamin w/minerals (THERA-VIT-M) tablet   vitamin B1 (THIAMINE) 100 MG tablet       Review of Systems   Constitutional: Negative for activity change, appetite change, chills, diaphoresis, fatigue and fever.   HENT: Negative for congestion, ear pain, rhinorrhea, sinus pain and sore throat.    Eyes: Negative for discharge, redness and visual disturbance.   Respiratory: Positive for chest tightness and shortness of breath. Negative for cough and wheezing.    Cardiovascular: Negative for chest pain.   Gastrointestinal: Positive for abdominal pain and vomiting. Negative for blood in stool and diarrhea.   Genitourinary: Negative for difficulty urinating.   Musculoskeletal: Positive for myalgias. Negative for neck stiffness.   Skin: Negative for rash.   Neurological: Negative for dizziness, seizures, speech difficulty and headaches.   Psychiatric/Behavioral: Negative for confusion and self-injury.   All other systems reviewed and are negative.      Physical Exam   BP: (refused)  Pulse: 113  Heart Rate: 104  Temp: 97  F (36.1  C)  Resp: 24  SpO2: 95 %      Physical Exam   Constitutional: He is oriented to person, place, and time. He appears well-developed and well-nourished. No distress.   HENT:   Head: Normocephalic and atraumatic.   Right Ear: External ear normal.   Left Ear: External ear normal.   Nose: Nose normal.   Eyes: Conjunctivae are normal. Right eye exhibits no discharge. Left eye exhibits no discharge.   Cardiovascular: Normal rate, regular rhythm, normal heart sounds and intact distal pulses. Exam reveals no gallop and no friction rub.   No murmur heard.  Pulmonary/Chest: Effort normal and breath sounds normal. No stridor. He has no wheezes.   Abdominal: There is no tenderness.   Musculoskeletal: He exhibits no edema.   Neurological: He is alert and oriented to person,  place, and time.   Skin: Skin is warm. No rash noted. He is not diaphoretic.   Nursing note and vitals reviewed.      ED Course     ED Course as of Mar 26 1652   Tue Mar 26, 2019   1241 Patient reports feeling better but still reports his hands are cramping but denies chest pain at this present time.  His girlfriend is at his bedside.  Patient is resting comfortably by appearance and does respond when talking to but otherwise seems to be resting.  Blood pressure is stable patient is no longer tachycardic.  Potassium is noted to be 3.0 and will order potassium and infusion.  Urinalysis is positive for cannabinoids.  CBC reveals leukocytosis that is likely dehydration induced.  Will order Protonix IV for any possible esophagitis.  Consulted with Dr. Santino Mcginnis regarding care collaborative care was completed.    1402 Reassess patient.  Patient is resting comfortably.  Patient denies chest pain.  Heart sounds regular.  Without tachycardia.  We will continue infusions for fluid replacement.    1530 Ambulated patient to bathroom.  Gait is steady.  Speech is clear.  Patient reports he feels ready for discharge.  Patient denies any pain presently.      Procedures       EKG Interpretation:      Interpreted by Todd Avery MD  Time reviewed: 1247  Symptoms at time of EKG: short of air, chest pressure   Rhythm: sinus rhythm  Rate: 87  Axis: Normal  Ectopy: none  Conduction: normal  ST Segments/ T Waves: non specific T wave abnormality  Comparison to prior: Unchanged from 03/07/2019    Clinical Impression: no evidence of acute ischemia      Results for orders placed or performed during the hospital encounter of 03/26/19 (from the past 24 hour(s))   CBC with platelets differential   Result Value Ref Range    WBC 22.1 (H) 4.0 - 11.0 10e9/L    RBC Count 5.22 4.4 - 5.9 10e12/L    Hemoglobin 16.0 13.3 - 17.7 g/dL    Hematocrit 46.0 40.0 - 53.0 %    MCV 88 78 - 100 fl    MCH 30.7 26.5 - 33.0 pg    MCHC 34.8 31.5 -  36.5 g/dL    RDW 12.4 10.0 - 15.0 %    Platelet Count 456 (H) 150 - 450 10e9/L    Diff Method Manual Differential     % Neutrophils 84.0 %    % Lymphocytes 9.0 %    % Monocytes 6.0 %    % Eosinophils 1.0 %    % Basophils 0.0 %    Absolute Neutrophil 18.6 (H) 1.6 - 8.3 10e9/L    Absolute Lymphocytes 2.0 0.8 - 5.3 10e9/L    Absolute Monocytes 1.3 0.0 - 1.3 10e9/L    Absolute Eosinophils 0.2 0.0 - 0.7 10e9/L    Absolute Basophils 0.0 0.0 - 0.2 10e9/L    RBC Morphology Normal     Platelet Estimate       Automated count confirmed.  Platelet morphology is normal.   Comprehensive metabolic panel   Result Value Ref Range    Sodium 133 133 - 144 mmol/L    Potassium 3.0 (L) 3.4 - 5.3 mmol/L    Chloride 82 (L) 94 - 109 mmol/L    Carbon Dioxide 28 20 - 32 mmol/L    Anion Gap 23 (H) 3 - 14 mmol/L    Glucose 196 (H) 70 - 99 mg/dL    Urea Nitrogen 18 7 - 30 mg/dL    Creatinine 0.99 0.66 - 1.25 mg/dL    GFR Estimate >90 >60 mL/min/[1.73_m2]    GFR Estimate If Black >90 >60 mL/min/[1.73_m2]    Calcium 9.8 8.5 - 10.1 mg/dL    Bilirubin Total 1.4 (H) 0.2 - 1.3 mg/dL    Albumin 5.2 (H) 3.4 - 5.0 g/dL    Protein Total 9.0 (H) 6.8 - 8.8 g/dL    Alkaline Phosphatase 77 40 - 150 U/L    ALT 34 0 - 70 U/L    AST 37 0 - 45 U/L   Alcohol ethyl   Result Value Ref Range    Ethanol g/dL <0.01 <0.01 g/dL   Troponin I   Result Value Ref Range    Troponin I ES <0.015 0.000 - 0.045 ug/L   Lipase   Result Value Ref Range    Lipase 158 73 - 393 U/L   Drug abuse screen urine   Result Value Ref Range    Amphetamine Qual Urine Negative NEG^Negative    Barbiturates Qual Urine Negative NEG^Negative    Benzodiazepine Qual Urine Negative NEG^Negative    Cannabinoids Qual Urine Positive (A) NEG^Negative    Cocaine Qual Urine Negative NEG^Negative    Opiates Qualitative Urine Negative NEG^Negative    PCP Qual Urine Negative NEG^Negative       Medications   lactated ringers infusion (0 mLs Intravenous Stopped 3/26/19 8254)   ondansetron (ZOFRAN) injection 4 mg  (4 mg Intravenous Given 3/26/19 1145)   lactated ringers BOLUS 1,000 mL (0 mLs Intravenous Stopped 3/26/19 1225)   LORazepam (ATIVAN) injection 0.5 mg (0.5 mg Intravenous Given 3/26/19 1146)   LORazepam (ATIVAN) injection 0.5 mg (0.5 mg Intravenous Given 3/26/19 1222)   pantoprazole (PROTONIX) 40 mg IV push injection (40 mg Intravenous Given 3/26/19 1240)   potassium chloride 10 mEq in 100 mL intermittent infusion with 10 mg lidocaine (0 mEq Intravenous Stopped 3/26/19 1423)       Assessments & Plan (with Medical Decision Making)     I have reviewed the nursing notes.    I have reviewed the findings, diagnosis, plan and need for follow up with the patient.  Kyle Ayala is a 28 year old male who presented to the emergency department with reports of acute intoxication and subsequent alcohol withdrawal.  Patient reports he had been drinking several shots at a time in the last alcohol intake was at 12:30 PM yesterday.  On exam patient agitated, anxious, tachycardic, alert and orientated x3.  Fluids initiated.  Patient given Ativan 0.5 mg IV twice and this led to substantial reduction in agitation.  There is no evidence of acute brain bleed or neurological abnormalities.Patient subsequently became sleepy but alert orientated with a [place] Coma Scale of 15.  CBC obtained and 22 which is likely inflammatory and dehydration related.  Potassium is 3.0 and 10 mEq of potassium was prescribed intravenously and given.  Patient given Protonix 40 mg IV for esophagitis.  Lipase completed and is normal.  Troponin completed and is negative no evidence of an STEMI or STEMI.  Blood alcohol completed and is negative.  Urine drug screen completed and positive for cannabinoids.  Patient admitted to having cannabinoids prior to presenting to the emergency department.  Patient received 2 L of fluids with good response and was no longer tachycardic.  Patient also received a total of 1.0 mg of Ativan and 4 mg of Zofran.  Encourage patient  to consider counseling for his recent grief of brother committing suicide and seek follow-up care with his psychiatrist for his medication management of anxiety and depression.  Girlfriend present and verbalizes understanding.  Patient present verbalizes understanding.       Medication List      There are no discharge medications for this visit.         Final diagnoses:   Alcohol withdrawal syndrome without complication (H)   Alcohol intoxication (H)       3/26/2019   [facility] EMERGENCY DEPARTMENT     Adelaide Mcclellan APRN CNP  03/26/19 6991

## 2019-03-26 NOTE — ED NOTES
"Pt had had H20, with 350 emesis following this. I asked him and his SO not to have anything to drink or eat until he get his s/sx under control. They both state understanding by saying 'yes\". meds given.   "

## 2019-03-26 NOTE — ED NOTES
Pt comes in with drawling from ETOH, has been taking multiple shots of ETOH daily for the past 5 days, has long HX of alcohol abuse and was recently seen with same s/sx. He had been doing well following that visit until the past Thursday. His brother has took his life and Mat is the one that found him. He has been drinking following this and the  is Thursday this week. He is having a hard time with this. His girlfriend Lainey at bedside.  S/sx started this am at 0800, last intake yesterday at noon

## 2019-03-26 NOTE — ED NOTES
"Last drink yesterday at noon  Pt is having withdrawals arms are sandra  Pt drinking 5-6 \" little bottle shots \" at least twice daily  Pt is anxious, heavy breathing  "

## 2019-03-26 NOTE — ED AVS SNAPSHOT
[facility] Emergency Department  [address] [address]  Phone:  [phone]  Fax:  [phone]                                    Kyle Ayala   MRN: [id number]    Department:  [facility] Emergency Department   Date of Visit:  3/26/2019           After Visit Summary Signature Page    I have received my discharge instructions, and my questions have been answered. I have discussed any challenges I see with this plan with the nurse or doctor.    ..........................................................................................................................................  Patient/Patient Representative Signature      ..........................................................................................................................................  Patient Representative Print Name and Relationship to Patient    ..................................................               ................................................  Date                                   Time    ..........................................................................................................................................  Reviewed by Signature/Title    ...................................................              ..............................................  Date                                               Time          22EPIC Rev 08/18

## 2019-03-27 ENCOUNTER — PATIENT OUTREACH (OUTPATIENT)
Dept: CARE COORDINATION | Facility: CLINIC | Age: 29
End: 2019-03-27

## 2019-03-27 ASSESSMENT — ACTIVITIES OF DAILY LIVING (ADL): DEPENDENT_IADLS:: INDEPENDENT

## 2019-03-27 NOTE — LETTER
[facility] CARE COORDINATION - [facility]  [address] [place] [address]  Ph:  [phone]    March 27, 2019    Kyle Ayala  [address]   [place] [address]      Dear Kyle,    I am a clinic care coordinator who works with YOSEF Ramirez CNP at [place]. I wanted to thank you for spending the time to talk with me.  I wanted to introduce myself and provide you with my contact information so that you can call me with questions or concerns about your health care. Below is a description of clinic care coordination and how I can further assist you.     The clinic care coordinator is a registered nurse and/or  who understand the health care system. The goal of clinic care coordination is to help you manage your health and improve access to the [facility] system in the most efficient manner. The registered nurse can assist you in meeting your health care goals by providing education, coordinating services, and strengthening the communication among your providers. The  can assist you with financial, behavioral, psychosocial, chemical dependency, counseling, and/or psychiatric resources.    Please feel free to contact me at [phone], with any questions or concerns. We at [facility] are focused on providing you with the highest-quality healthcare experience possible and that all starts with you.     Sincerely,     Jacki Fernandez, [facility]  Clinic Care Coordination  [phone]      Enclosed: I have enclosed a copy of a 24 Hour Access Plan. This has helpful phone numbers for you to call when needed. Please keep this in an easy to access place to use as needed.

## 2019-03-27 NOTE — PROGRESS NOTES
Clinic Care Coordination Contact    Clinic Care Coordination Contact  OUTREACH    Referral Information:  Referral Source: ED Follow-Up    Primary Diagnosis: CD    Chief Complaint   Patient presents with     Clinic Care Coordination - [facility]     sw        Universal Utilization: The patient has been to the emergency room 2 times in the last 20 days.  Both related to alcohol withdrawal.  Clinic Utilization  Difficulty keeping appointments:: No  Compliance Concerns: No  No-Show Concerns: No  No PCP office visit in Past Year: No  Utilization    Last refreshed: 3/26/2019  7:44 PM:  Hospital Admissions 1           Last refreshed: 3/26/2019  7:44 PM:  ED Visits 1           Last refreshed: 3/26/2019  7:44 PM:  No Show Count (past year) 1              Current as of: 3/26/2019  7:44 PM              Clinical Concerns:  Current Medical Concerns: Patient has been to the emergency room related to alcohol withdrawal.  Current Behavioral Concerns: Patient said he had been doing well after the emergency room visit beginning of the month.  He then found his brother after he had  from suicide and this instigated returning to drinking.  Patient feels he has supports to help stop drinking and he also did get some counseling resources from the  home.  Education Provided to patient: Listened to patient offered empathy for his situation and offered condolences for his loss.  Shared the challenges with losing someone from suicide.  Per patient they do not know the real reason behind his brothers decision which then causes uncertainties.    Discussed options and support and right now patient feels like he has the supports he needs.  Patient is going to try and get into counseling to help with the grieving and to AA groups.  Discussed  calling in a couple weeks to check in and work on additional supports to help him.  Patient agreed this was a good time frame.     Health Maintenance Reviewed: Due/Overdue   Health  Maintenance Due   Topic Date Due     HIV SCREEN (SYSTEM ASSIGNED)  06/20/2009     PREVENTIVE CARE VISIT  01/07/2016     DTAP/TDAP/TD IMMUNIZATION (1 - Tdap) 06/20/2016     ASTHMA ACTION PLAN Q1 YR  02/01/2017     INFLUENZA VACCINE (1) 09/01/2018     ASTHMA CONTROL TEST Q6 MOS  10/11/2018     BRIGETTE QUESTIONNAIRE 6 MONTHS  03/10/2019     PHQ-9 Q6 MONTHS  03/10/2019       Clinical Pathway: None    Medication Management:  Patient denied any questions or concerns    Functional Status:  Dependent ADLs:: Independent  Dependent IADLs:: Independent  Bed or wheelchair confined:: No  Mobility Status: Independent    Living Situation:  Current living arrangement:: I live in a private home with family  Type of residence:: Apartment    Diet/Exercise/Sleep:       Transportation:  Transportation concerns (GOAL):: No  Transportation means:: Regular car     Psychosocial:  Mental health DX:: Yes  Mental health DX how managed:: None  Informal Support system:: Family, Significant other     Financial/Insurance:   Financial/Insurance concerns (GOAL):: No  Assessment was not completed during this initial phone call.  Ensured that patient had the resources he needed with the initial shock of losing his brother and to help with refraining from alcohol.    Patient did have questions about insurance and he did apply for medical assistance at his hospital stay earlier this month.  He has not received any phone call or information from the [facility] regarding this application.  Encouraged patient to call the [facility] to verify that they received all the paperwork that needed and check on timeframe.  Patient excepted number for [facility] and said he felt comfortable with making a phone call.     Resources and Interventions:  Current Resources:    ;   Community Resources: None  Supplies used at home:: None  Equipment Currently Used at Home: none    Advance Care Plan/Directive  Advanced Care Plans/Directives on file:: No    Referrals Placed: None      Goals:         Patient/Caregiver understanding: n/a    Outreach Frequency: 2 weeks      Plan: Patient to call the resources he has for counseling/grief support.  Patient to call [facility] and check on services.   to send introduction letter and access plan.   to call in about 2 weeks.    ZONIA Wells, Clinic Care Coordinator 3/27/2019   1:28 PM  [phone]

## 2019-03-27 NOTE — LETTER
Health Care Home - Access Care Plan    About Me  Patient Name:  Kyle Ayala    YOB: 1990  Age:                             28 year old   Tim MRN:            [id number] Telephone Information:   Home Phone [phone]   Mobile [phone]       Address:    [address]  [place] [address] Email address:  [email or web address]      Emergency Contact(s)  Name Relationship Lgl Grd Work Phone Home Phone Mobile Phone   1. KINSEY MOONEY* Friend    [phone]             Health Maintenance: Routine Health maintenance Reviewed: Due/Overdue   Health Maintenance Due   Topic Date Due     HIV SCREEN (SYSTEM ASSIGNED)  06/20/2009     PREVENTIVE CARE VISIT  01/07/2016     DTAP/TDAP/TD IMMUNIZATION (1 - Tdap) 06/20/2016     ASTHMA ACTION PLAN Q1 YR  02/01/2017     INFLUENZA VACCINE (1) 09/01/2018     ASTHMA CONTROL TEST Q6 MOS  10/11/2018     BRIGETTE QUESTIONNAIRE 6 MONTHS  03/10/2019     PHQ-9 Q6 MONTHS  03/10/2019     Please talk with your provider about what is needed or can continue to wait.        My Access Plan  Medical Emergency 914   Questions or concerns during clinic hours Primary Clinic Line, I will call the clinic directly: [facility] - [phone]   24 Hour Appointment Line [phone] or  [phone]) (toll free)   24 Hour Nurse Line [phone] (toll free)   Questions or concerns outside clinic hours 24 Hour Appointment Line, I will call the after-hours on-call line:   [facility] [phone] or [phone]) (toll-free)   Preferred Urgent Care [facility], [phone]   Preferred Hospital [place], [place]  [phone]   Preferred Pharmacy [facility] [place], [place] - [address] [place] [address]     Behavioral Health Crisis Line The National Suicide Prevention Lifeline at 1-363.357.9066 or 911     My Care Team Members  Patient Care Team       Relationship  Specialty Notifications Start End    Carrie Sanchez APRN CNP PCP - General Nurse Practitioner - Gerontology  3/8/19     Phone: [phone] Fax: [phone] [address] [facility] [address]    Carrie Sanchez APRN CNP Assigned PCP   12/16/18     Phone: [phone] Fax: [phone] [address] [facility] [facility] [facility] [address]    Jacki Fernandez LSW Clinic Care Coordinator Primary Care - [facility] Admissions 3/27/19     Phone: [phone] Fax: [phone]               My Medical and Care Information  Problem List   Patient Active Problem List   Diagnosis     Loss of weight     Tobacco abuse     Newly recognized heart murmur     Vomiting     Anxiety     Health Care Home     Mild intermittent asthma     Mild major depression     Dehydration     Hypokalemia     Hyponatremia     Alcohol dependence (H)     Acute hyperactive alcohol withdrawal delirium (H)     PAF (paroxysmal atrial fibrillation) (H)     Non-rheumatic mitral regurgitation     Alcohol withdrawal (H)     Acute kidney failure (H)     Metabolic acidosis, increased anion gap      Current Medications and Allergies:  See printed Medication Report

## 2019-03-29 NOTE — PROGRESS NOTES
Clinic Care Coordination Contact  Care Team Conversations    Chart reviewed. Discussed with Jacki TEJEDA, who agrees to follow up with patient.     Anya Turner R.N.  Clinic Care Coordinator  [facility]  [phone]

## 2019-04-11 ENCOUNTER — PATIENT OUTREACH (OUTPATIENT)
Dept: CARE COORDINATION | Facility: CLINIC | Age: 29
End: 2019-04-11

## 2019-04-11 ASSESSMENT — ACTIVITIES OF DAILY LIVING (ADL): DEPENDENT_IADLS:: INDEPENDENT

## 2019-04-11 NOTE — PROGRESS NOTES
Clinic Care Coordination Contact  [facility]/Voicemail    Referral Source: ED Follow-Up  Clinical Data: Care Coordinator Outreach  Outreach attempted x 1.  Left message on voicemail with call back information and requested return call.  Plan: Care Coordinator will try to reach patient again in 1-2 weeks.  ZONIA Wells, [facility] - Care Coordinator   [facility]  4/11/2019   9:51 AM  [phone]

## 2019-04-11 NOTE — LETTER
[facility]  [address] [place] [address]  Ph:  [phone]    4/25/2019      Kyle Ayala  [address]   [place] [address]      Dear  Kyle,      I have been attempting to reach you since our last contact. I would like to continue to work with you on the goals from our last conversation and provide the support you may need. I would appreciate if you would give me a call at [phone] and let me know if you would like to continue working together. I know that there are many things that can affect our ability to communicate and hope we can plan better moving forward.     All of us at [facility] are invested in your health and are here to assist you in meeting your goals.     Sincerely,      ZONIA Wells  [facility] - Care Coordination  [facility]   [phone]

## 2019-04-18 ASSESSMENT — ACTIVITIES OF DAILY LIVING (ADL): DEPENDENT_IADLS:: INDEPENDENT

## 2019-04-18 NOTE — PROGRESS NOTES
Clinic Care Coordination Contact  Outreach    Patient answered phone and said it was not a good.  Patient asked if he could call back later.     will await return phone call and if no call back will call in 6-12 business days.    ZONIA Wells, [facility] - Care Coordinator   [facility]  4/18/2019   1:12 PM  [phone]

## 2019-04-25 ASSESSMENT — ACTIVITIES OF DAILY LIVING (ADL): DEPENDENT_IADLS:: INDEPENDENT

## 2019-05-15 ASSESSMENT — ACTIVITIES OF DAILY LIVING (ADL): DEPENDENT_IADLS:: INDEPENDENT

## 2019-05-15 NOTE — PROGRESS NOTES
Clinic Care Coordination Contact    Patient has not returned phone call after multiple messages and unable to contact letter sent on 4/25/2019.  We will close to care coordination at this time is unable to reengage patient.    ZONIA Wells, [facility] - Care Coordinator   [facility]  5/15/2019   3:24 PM  [phone]

## 2019-06-06 ENCOUNTER — TELEPHONE (OUTPATIENT)
Dept: FAMILY MEDICINE | Facility: CLINIC | Age: 29
End: 2019-06-06

## 2019-06-06 NOTE — TELEPHONE ENCOUNTER
Panel Management Review      Patient has the following on his problem list:     Depression / Dysthymia review    Measure:  Needs PHQ-9 score of 4 or less during index window.  Administer PHQ-9 and if score is 5 or more, send encounter to provider for next steps.        PHQ-9 SCORE 1/29/2016 4/11/2018 9/10/2018   PHQ-9 Total Score - - -   PHQ-9 Total Score 9 0 11       If PHQ-9 recheck is 5 or more, route to provider for next steps.    Patient is due for:  PHQ9    Asthma review     ACT Total Scores 4/11/2018   ACT TOTAL SCORE -   ASTHMA ER VISITS -   ASTHMA HOSPITALIZATIONS -   ACT TOTAL SCORE (Goal Greater than or Equal to 20) 25   In the past 12 months, how many times did you visit the emergency room for your asthma without being admitted to the hospital? 0   In the past 12 months, how many times were you hospitalized overnight because of your asthma? 0      1. Is Asthma diagnosis on the Problem List? Yes    2. Is Asthma listed on Health Maintenance? Yes    3. Patient is due for:  ACT      Composite cancer screening  Chart review shows that this patient is due/due soon for the following None  Summary:    Patient is due/failing the following:   ACT and PHQ9    Action needed:   Patient needs to do ACT. and Patient needs to do PHQ9.    Type of outreach:    Letter mailed with ACT and phq/analilia for the patient to complete and return.      Questions for provider review:    None                                                                                                                                    MONY Jerry MA

## 2019-06-06 NOTE — LETTER
June 6, 2019      Kyle Ayala  [address]   [place] [place] [address]        Dear Kyle,     In order to ensure we are providing the best quality care, we have reviewed your chart and see that you are due for:  An update for the asthma and depression questionnaires.  These tools help your provider to monitor and manage your symptoms and treatment plan.  Please complete the enclosed forms and return in the provided envelope.  Thank you for trusting us with your health care.  Sincerely,    Your [facility] Care Team/lw

## 2019-09-19 ENCOUNTER — TELEPHONE (OUTPATIENT)
Dept: FAMILY MEDICINE | Facility: CLINIC | Age: 29
End: 2019-09-19

## 2019-09-19 NOTE — TELEPHONE ENCOUNTER
Panel Management Review      Patient has the following on his problem list:     Depression / Dysthymia review    Measure:  Needs PHQ-9 score of 4 or less during index window.  Administer PHQ-9 and if score is 5 or more, send encounter to provider for next steps.    10 - 14 month window range: 7/12/19-11/19/19    PHQ-9 SCORE 1/29/2016 4/11/2018 9/10/2018   PHQ-9 Total Score - - -   PHQ-9 Total Score 9 0 11       If PHQ-9 recheck is 5 or more, route to provider for next steps.    Patient is due for:  PHQ9    Asthma review     ACT Total Scores 4/11/2018   ACT TOTAL SCORE -   ASTHMA ER VISITS -   ASTHMA HOSPITALIZATIONS -   ACT TOTAL SCORE (Goal Greater than or Equal to 20) 25   In the past 12 months, how many times did you visit the emergency room for your asthma without being admitted to the hospital? 0   In the past 12 months, how many times were you hospitalized overnight because of your asthma? 0      1. Is Asthma diagnosis on the Problem List? Yes    2. Is Asthma listed on Health Maintenance? Yes    3. Patient is due for:  ACT      Composite cancer screening  Chart review shows that this patient is due/due soon for the following None  Summary:    Patient is due/failing the following:   ACT and PHQ9    Action needed:   Patient needs to do ACT. and Patient needs to do PHQ9.    Type of outreach:    Letter mailed with ACT and phq/analilia for the patient to complete and return.      Questions for provider review:    None                                                                                                                                    MONY Jerry MA

## 2019-09-19 NOTE — LETTER
September 19, 2019      Kyle Ayala  [address]   [place] [address]        Dear yKle,     In order to ensure we are providing the best quality care, we have reviewed your chart and see that you are due for:  An update on the asthma and depression questionnaires.  These tools help your provider to monitor and manage your symptoms and treatment plan.  Please complete the enclosed forms and return in the provided envelope.    Please call the clinic with any questions or concerns.    Thank you for trusting us with your health care.  Sincerely,    Your [facility] Care Team/lw

## 2019-12-21 ENCOUNTER — HOSPITAL ENCOUNTER (EMERGENCY)
Facility: CLINIC | Age: 29
Discharge: HOME | End: 2019-12-21
Attending: EMERGENCY MEDICINE | Admitting: EMERGENCY MEDICINE
Payer: COMMERCIAL

## 2019-12-21 VITALS
OXYGEN SATURATION: 100 % | TEMPERATURE: 97.2 F | SYSTOLIC BLOOD PRESSURE: 130 MMHG | DIASTOLIC BLOOD PRESSURE: 82 MMHG | WEIGHT: 140 LBS | HEART RATE: 98 BPM | RESPIRATION RATE: 18 BRPM | BODY MASS INDEX: 22.6 KG/M2

## 2019-12-21 DIAGNOSIS — F41.9 ANXIETY: ICD-10-CM

## 2019-12-21 DIAGNOSIS — F10.10 ALCOHOL ABUSE, EPISODIC DRINKING BEHAVIOR: ICD-10-CM

## 2019-12-21 LAB
ALBUMIN SERPL-MCNC: 4.6 G/DL (ref 3.4–5)
ALP SERPL-CCNC: 78 U/L (ref 40–150)
ALT SERPL W P-5'-P-CCNC: 35 U/L (ref 0–70)
ANION GAP SERPL CALCULATED.3IONS-SCNC: 19 MMOL/L (ref 3–14)
AST SERPL W P-5'-P-CCNC: 41 U/L (ref 0–45)
BASOPHILS # BLD AUTO: 0.1 10E9/L (ref 0–0.2)
BASOPHILS NFR BLD AUTO: 0.8 %
BILIRUB SERPL-MCNC: 0.9 MG/DL (ref 0.2–1.3)
BUN SERPL-MCNC: 14 MG/DL (ref 7–30)
CALCIUM SERPL-MCNC: 9.5 MG/DL (ref 8.5–10.1)
CHLORIDE SERPL-SCNC: 98 MMOL/L (ref 94–109)
CO2 SERPL-SCNC: 18 MMOL/L (ref 20–32)
CREAT SERPL-MCNC: 0.67 MG/DL (ref 0.66–1.25)
DIFFERENTIAL METHOD BLD: ABNORMAL
EOSINOPHIL # BLD AUTO: 0 10E9/L (ref 0–0.7)
EOSINOPHIL NFR BLD AUTO: 0 %
ERYTHROCYTE [DISTWIDTH] IN BLOOD BY AUTOMATED COUNT: 13.2 % (ref 10–15)
ETHANOL SERPL-MCNC: 0.03 G/DL
GFR SERPL CREATININE-BSD FRML MDRD: >90 ML/MIN/{1.73_M2}
GLUCOSE SERPL-MCNC: 87 MG/DL (ref 70–99)
HCT VFR BLD AUTO: 42.8 % (ref 40–53)
HGB BLD-MCNC: 14.5 G/DL (ref 13.3–17.7)
IMM GRANULOCYTES # BLD: 0 10E9/L (ref 0–0.4)
IMM GRANULOCYTES NFR BLD: 0.3 %
LIPASE SERPL-CCNC: 76 U/L (ref 73–393)
LYMPHOCYTES # BLD AUTO: 1.5 10E9/L (ref 0.8–5.3)
LYMPHOCYTES NFR BLD AUTO: 13.1 %
MAGNESIUM SERPL-MCNC: 1.6 MG/DL (ref 1.6–2.3)
MCH RBC QN AUTO: 31 PG (ref 26.5–33)
MCHC RBC AUTO-ENTMCNC: 33.9 G/DL (ref 31.5–36.5)
MCV RBC AUTO: 92 FL (ref 78–100)
MONOCYTES # BLD AUTO: 0.5 10E9/L (ref 0–1.3)
MONOCYTES NFR BLD AUTO: 4.2 %
NEUTROPHILS # BLD AUTO: 9.5 10E9/L (ref 1.6–8.3)
NEUTROPHILS NFR BLD AUTO: 81.6 %
NRBC # BLD AUTO: 0 10*3/UL
NRBC BLD AUTO-RTO: 0 /100
PLATELET # BLD AUTO: 327 10E9/L (ref 150–450)
POTASSIUM SERPL-SCNC: 3.2 MMOL/L (ref 3.4–5.3)
PROT SERPL-MCNC: 8.2 G/DL (ref 6.8–8.8)
RBC # BLD AUTO: 4.68 10E12/L (ref 4.4–5.9)
SODIUM SERPL-SCNC: 135 MMOL/L (ref 133–144)
WBC # BLD AUTO: 11.6 10E9/L (ref 4–11)

## 2019-12-21 PROCEDURE — 83735 ASSAY OF MAGNESIUM: CPT | Performed by: EMERGENCY MEDICINE

## 2019-12-21 PROCEDURE — 99285 EMERGENCY DEPT VISIT HI MDM: CPT | Mod: Z6 | Performed by: EMERGENCY MEDICINE

## 2019-12-21 PROCEDURE — 83690 ASSAY OF LIPASE: CPT | Performed by: EMERGENCY MEDICINE

## 2019-12-21 PROCEDURE — 80053 COMPREHEN METABOLIC PANEL: CPT | Performed by: EMERGENCY MEDICINE

## 2019-12-21 PROCEDURE — 85025 COMPLETE CBC W/AUTO DIFF WBC: CPT | Performed by: EMERGENCY MEDICINE

## 2019-12-21 PROCEDURE — 99285 EMERGENCY DEPT VISIT HI MDM: CPT | Mod: 25 | Performed by: EMERGENCY MEDICINE

## 2019-12-21 PROCEDURE — 80320 DRUG SCREEN QUANTALCOHOLS: CPT | Performed by: EMERGENCY MEDICINE

## 2019-12-21 PROCEDURE — 96361 HYDRATE IV INFUSION ADD-ON: CPT | Performed by: EMERGENCY MEDICINE

## 2019-12-21 PROCEDURE — 25000128 H RX IP 250 OP 636: Performed by: EMERGENCY MEDICINE

## 2019-12-21 PROCEDURE — 96365 THER/PROPH/DIAG IV INF INIT: CPT | Performed by: EMERGENCY MEDICINE

## 2019-12-21 PROCEDURE — 96375 TX/PRO/DX INJ NEW DRUG ADDON: CPT | Performed by: EMERGENCY MEDICINE

## 2019-12-21 PROCEDURE — 25000125 ZZHC RX 250: Performed by: EMERGENCY MEDICINE

## 2019-12-21 PROCEDURE — 25800030 ZZH RX IP 258 OP 636: Performed by: EMERGENCY MEDICINE

## 2019-12-21 RX ORDER — LORAZEPAM 1 MG/1
.5-1 TABLET ORAL EVERY 8 HOURS PRN
Qty: 12 TABLET | Refills: 0 | Status: ON HOLD | OUTPATIENT
Start: 2019-12-21 | End: 2021-02-22

## 2019-12-21 RX ORDER — ONDANSETRON 4 MG/1
4 TABLET, ORALLY DISINTEGRATING ORAL EVERY 8 HOURS PRN
Qty: 10 TABLET | Refills: 1 | Status: SHIPPED | OUTPATIENT
Start: 2019-12-21 | End: 2020-02-10

## 2019-12-21 RX ORDER — ONDANSETRON 2 MG/ML
4 INJECTION INTRAMUSCULAR; INTRAVENOUS EVERY 30 MIN PRN
Status: DISCONTINUED | OUTPATIENT
Start: 2019-12-21 | End: 2019-12-21 | Stop reason: HOSPADM

## 2019-12-21 RX ORDER — LORAZEPAM 2 MG/ML
1 INJECTION INTRAMUSCULAR ONCE
Status: COMPLETED | OUTPATIENT
Start: 2019-12-21 | End: 2019-12-21

## 2019-12-21 RX ORDER — SODIUM CHLORIDE 9 MG/ML
1000 INJECTION, SOLUTION INTRAVENOUS CONTINUOUS
Status: DISCONTINUED | OUTPATIENT
Start: 2019-12-21 | End: 2019-12-21 | Stop reason: HOSPADM

## 2019-12-21 RX ADMIN — ONDANSETRON 4 MG: 2 INJECTION INTRAMUSCULAR; INTRAVENOUS at 07:15

## 2019-12-21 RX ADMIN — SODIUM CHLORIDE 1000 ML: 9 INJECTION, SOLUTION INTRAVENOUS at 07:32

## 2019-12-21 RX ADMIN — FOLIC ACID: 5 INJECTION, SOLUTION INTRAMUSCULAR; INTRAVENOUS; SUBCUTANEOUS at 08:50

## 2019-12-21 RX ADMIN — LORAZEPAM 1 MG: 2 INJECTION INTRAMUSCULAR; INTRAVENOUS at 08:02

## 2019-12-21 RX ADMIN — FAMOTIDINE 20 MG: 20 INJECTION, SOLUTION INTRAVENOUS at 07:50

## 2019-12-21 NOTE — ED PROVIDER NOTES
History     Chief Complaint   Patient presents with     Nausea & Vomiting     Mother recently passed away.  Has been drinking since Tuesday Now nausea vomiting     Abdominal Pain     HPI  Kyle Ayala is a 29 year old male with history of alcohol dependence and abuse with recurrent binge drinking in the past several days, beginning 3 days ago due to anxiety and stressors regarding caring for his mother and multiple recent deaths in the family he reports he has been drinking approximately 1.5 pints of rum per day with last alcohol consumption at approximately 1900 p.m. last evening. He is developed nausea and vomiting, but denies abdominal pain and has no signs or symptoms of GI bleeding.  He denies other signs or symptoms of alcohol withdrawal.  He has a history of DTs but no withdrawal seizures.  He has had previous CD treatment at the [facility], approximately 2 years ago.  No other acute complaints or concerns.    Allergies:  Allergies   Allergen Reactions     Penicillins      Patient says he was allergic to penicllin as a child but not anymore       Problem List:    Patient Active Problem List    Diagnosis Date Noted     Alcohol withdrawal (H) 03/07/2019     Priority: Medium     Acute kidney failure (H) 03/07/2019     Priority: Medium     Metabolic acidosis, increased anion gap 03/07/2019     Priority: Medium     PAF (paroxysmal atrial fibrillation) (H) 02/29/2016     Priority: Medium     Non-rheumatic mitral regurgitation 02/29/2016     Priority: Medium     Echo 2/29/2016- Left ventricular systolic function is normal.The visual ejection fraction is estimated at 55-60%. The right ventricular systolic function is normal. There is moderate (2+) mitral regurgitation.The mitral regurgitant jet is eccentrically directed. Severity of the mitral regurgitation may be underestimated due to eccentric jet.       Acute hyperactive alcohol withdrawal delirium (H) 02/28/2016     Priority: Medium      Dehydration 02/27/2016     Priority: Medium     Hypokalemia 02/27/2016     Priority: Medium     Hyponatremia 02/27/2016     Priority: Medium     Alcohol dependence (H) 02/27/2016     Priority: Medium     Mild major depression 01/08/2015     Priority: Medium     Mild intermittent asthma 01/07/2015     Priority: Medium     Health Care Home 12/11/2014     Priority: Medium     State Tier Level:    Status:  Unable to reach  Care Coordinator:  Jacki Fernandez    See Letters for [facility] Care Plan  Date:  March 9, 2016           Anxiety 11/21/2014     Priority: Medium     Newly recognized heart murmur 07/30/2014     Priority: Medium     Diagnosis updated by automated process. Provider to review and confirm.       Vomiting 07/30/2014     Priority: Medium     Loss of weight 05/21/2014     Priority: Medium     Tobacco abuse 05/21/2014     Priority: Medium        Past Medical History:    Past Medical History:   Diagnosis Date     Acute renal failure (ARF) (H) 2/27/2016     Asthma        Past Surgical History:    Past Surgical History:   Procedure Laterality Date     LACERATION REPAIR Right     Right thigh, chain saw accident       Family History:    Family History   Problem Relation Age of Onset     Alcohol/Drug Mother      Dementia Mother      Asthma Father      Respiratory Father 54        COPD     Alcohol/Drug Father      Dementia Maternal Grandmother      Alzheimer Disease Maternal Grandfather         dementia     C.A.D. Paternal Grandfather      Heart Disease Brother         congenital heart, heart surgery when born     Alcohol/Drug Brother        Social History:  Marital Status:  Single [1]  Social History     Tobacco Use     Smoking status: Current Every Day Smoker     Packs/day: 1.00     Types: Cigarettes     Smokeless tobacco: Never Used   Substance Use Topics     Alcohol use: Yes     Drug use: Yes     Types: Marijuana     Comment: marijuana occ.        Medications:    LORazepam (ATIVAN) 1 MG tablet  ondansetron (ZOFRAN  ODT) 4 MG ODT tab  albuterol (PROAIR HFA/PROVENTIL HFA/VENTOLIN HFA) 108 (90 Base) MCG/ACT inhaler  folic acid (FOLVITE) 1 MG tablet  multivitamin w/minerals (THERA-VIT-M) tablet  vitamin B1 (THIAMINE) 100 MG tablet        Review of Systems  As mentioned above in the history present illness.  All other systems were reviewed and are negative.    Physical Exam   BP: 125/79  Pulse: 108  Temp: 97.2  F (36.2  C)  Resp: 18  Weight: 63.5 kg (140 lb)  SpO2: 100 %      Physical Exam  Vitals signs and nursing note reviewed.   Constitutional:       General: He is not in acute distress.     Appearance: Normal appearance. He is well-developed. He is not ill-appearing or diaphoretic.   HENT:      Head: Normocephalic and atraumatic.      Right Ear: External ear normal.      Left Ear: External ear normal.      Nose: Nose normal.      Mouth/Throat:      Pharynx: Oropharynx is clear.      Comments: Oral mucosa dry  Eyes:      General: No scleral icterus.     Extraocular Movements: Extraocular movements intact.      Conjunctiva/sclera: Conjunctivae normal.   Neck:      Musculoskeletal: Normal range of motion and neck supple.      Trachea: No tracheal deviation.   Cardiovascular:      Rate and Rhythm: Normal rate and regular rhythm.      Heart sounds: Normal heart sounds. No murmur. No friction rub. No gallop.    Pulmonary:      Effort: Pulmonary effort is normal. No respiratory distress.      Breath sounds: Normal breath sounds. No wheezing, rhonchi or rales.   Abdominal:      General: Bowel sounds are normal. There is no distension.      Palpations: Abdomen is soft.      Tenderness: There is no abdominal tenderness. There is no right CVA tenderness, left CVA tenderness, guarding or rebound. Negative signs include Bond's sign and McBurney's sign.      Hernia: No hernia is present.   Musculoskeletal: Normal range of motion.         General: No tenderness.      Right lower leg: No edema.      Left lower leg: No edema.   Skin:      General: Skin is warm and dry.      Coloration: Skin is not pale.      Findings: No erythema or rash.   Neurological:      General: No focal deficit present.      Mental Status: He is alert and oriented to person, place, and time.      Sensory: Sensation is intact.      Motor: Motor function is intact. No tremor.      Coordination: Coordination is intact. Coordination normal.   Psychiatric:         Mood and Affect: Mood normal.         Behavior: Behavior normal.         ED Course        Procedures                 Results for orders placed or performed during the hospital encounter of 12/21/19 (from the past 24 hour(s))   CBC with platelets differential   Result Value Ref Range    WBC 11.6 (H) 4.0 - 11.0 10e9/L    RBC Count 4.68 4.4 - 5.9 10e12/L    Hemoglobin 14.5 13.3 - 17.7 g/dL    Hematocrit 42.8 40.0 - 53.0 %    MCV 92 78 - 100 fl    MCH 31.0 26.5 - 33.0 pg    MCHC 33.9 31.5 - 36.5 g/dL    RDW 13.2 10.0 - 15.0 %    Platelet Count 327 150 - 450 10e9/L    Diff Method Automated Method     % Neutrophils 81.6 %    % Lymphocytes 13.1 %    % Monocytes 4.2 %    % Eosinophils 0.0 %    % Basophils 0.8 %    % Immature Granulocytes 0.3 %    Nucleated RBCs 0 0 /100    Absolute Neutrophil 9.5 (H) 1.6 - 8.3 10e9/L    Absolute Lymphocytes 1.5 0.8 - 5.3 10e9/L    Absolute Monocytes 0.5 0.0 - 1.3 10e9/L    Absolute Eosinophils 0.0 0.0 - 0.7 10e9/L    Absolute Basophils 0.1 0.0 - 0.2 10e9/L    Abs Immature Granulocytes 0.0 0 - 0.4 10e9/L    Absolute Nucleated RBC 0.0    Comprehensive metabolic panel   Result Value Ref Range    Sodium 135 133 - 144 mmol/L    Potassium 3.2 (L) 3.4 - 5.3 mmol/L    Chloride 98 94 - 109 mmol/L    Carbon Dioxide 18 (L) 20 - 32 mmol/L    Anion Gap 19 (H) 3 - 14 mmol/L    Glucose 87 70 - 99 mg/dL    Urea Nitrogen 14 7 - 30 mg/dL    Creatinine 0.67 0.66 - 1.25 mg/dL    GFR Estimate >90 >60 mL/min/[1.73_m2]    GFR Estimate If Black >90 >60 mL/min/[1.73_m2]    Calcium 9.5 8.5 - 10.1 mg/dL    Bilirubin Total  0.9 0.2 - 1.3 mg/dL    Albumin 4.6 3.4 - 5.0 g/dL    Protein Total 8.2 6.8 - 8.8 g/dL    Alkaline Phosphatase 78 40 - 150 U/L    ALT 35 0 - 70 U/L    AST 41 0 - 45 U/L   Lipase   Result Value Ref Range    Lipase 76 73 - 393 U/L   Alcohol ethyl   Result Value Ref Range    Ethanol g/dL 0.03 (H) <0.01 g/dL   Magnesium   Result Value Ref Range    Magnesium 1.6 1.6 - 2.3 mg/dL       Medications   ondansetron (ZOFRAN) injection 4 mg (4 mg Intravenous Given 12/21/19 0715)   0.9% sodium chloride BOLUS (0 mLs Intravenous Stopped 12/21/19 0850)     Followed by   sodium chloride 0.9% infusion (has no administration in time range)   sodium chloride 0.9 % 1,000 mL with Infuvite Adult 10 mL, thiamine 100 mg, folic acid 1 mg infusion ( Intravenous New Bag 12/21/19 0850)   famotidine (PEPCID) infusion 20 mg (0 mg Intravenous Stopped 12/21/19 0838)   LORazepam (ATIVAN) injection 1 mg (1 mg Intravenous Given 12/21/19 0802)     9:34 AM - He is feeling much improved, no evidence of alcohol withdrawal, declines admission/transfer to a detox facility.  Comfortable discharge home.    Assessments & Plan (with Medical Decision Making)   29-year-old male with history of alcohol dependence and abuse who had been sober since March of this year and then relapsed 3 days ago with binge drinking of approximately 1.5 pints of rum daily in the past 3 days.  He presents for nausea and vomiting, but had abdominal pain, evidence of GI bleeding or other signs or symptoms of withdrawal at present time.  He felt much improved after IV fluids, a banana bag with IV folate, thiamine and multivitamin, and Ativan 1 mg IV.  He appears stable for discharge home to pursue outpatient CD treatment and will reestablish contact with AA.  He declined admission or transfer to a detox facility at this time.  Will Rx Zofran and the small number of Ativan to use prn.  I recommended clinic recheck next week and return as needed for new or worsening symptoms.    I have  reviewed the nursing notes.    I have reviewed the findings, diagnosis, plan and need for follow up with the patient.    New Prescriptions    LORAZEPAM (ATIVAN) 1 MG TABLET    Take 0.5-1 tablets (0.5-1 mg) by mouth every 8 hours as needed for anxiety (or insomnia)    ONDANSETRON (ZOFRAN ODT) 4 MG ODT TAB    Take 1 tablet (4 mg) by mouth every 8 hours as needed for nausea or vomiting       Final diagnoses:   Alcohol abuse, episodic drinking behavior   Anxiety       12/21/2019   [facility] EMERGENCY DEPARTMENT     Jose Alfredo Soto MD  12/21/19 8590

## 2019-12-21 NOTE — ED NOTES
Patient recently lost brother, grandfather and now past week mother.  Stated has been drinking since Tuesday.  Per SO about 2 Liters a day.  Mid abd pain.  Nauseated .

## 2019-12-21 NOTE — ED AVS SNAPSHOT
[facility] Emergency Department  [address] [address]  Phone:  [phone]  Fax:  [phone]                                    Kyle Ayala   MRN: [id number]    Department:  [facility] Emergency Department   Date of Visit:  12/21/2019           After Visit Summary Signature Page    I have received my discharge instructions, and my questions have been answered. I have discussed any challenges I see with this plan with the nurse or doctor.    ..........................................................................................................................................  Patient/Patient Representative Signature      ..........................................................................................................................................  Patient Representative Print Name and Relationship to Patient    ..................................................               ................................................  Date                                   Time    ..........................................................................................................................................  Reviewed by Signature/Title    ...................................................              ..............................................  Date                                               Time          22EPIC Rev 08/18

## 2020-02-10 ENCOUNTER — HOSPITAL ENCOUNTER (EMERGENCY)
Facility: CLINIC | Age: 30
Discharge: HOME | End: 2020-02-10
Attending: EMERGENCY MEDICINE | Admitting: EMERGENCY MEDICINE
Payer: COMMERCIAL

## 2020-02-10 VITALS
BODY MASS INDEX: 21 KG/M2 | WEIGHT: 150 LBS | OXYGEN SATURATION: 95 % | RESPIRATION RATE: 8 BRPM | TEMPERATURE: 97.7 F | HEIGHT: 71 IN | SYSTOLIC BLOOD PRESSURE: 113 MMHG | DIASTOLIC BLOOD PRESSURE: 72 MMHG | HEART RATE: 79 BPM

## 2020-02-10 DIAGNOSIS — E87.6 HYPOKALEMIA: ICD-10-CM

## 2020-02-10 DIAGNOSIS — R11.2 INTRACTABLE NAUSEA AND VOMITING: ICD-10-CM

## 2020-02-10 DIAGNOSIS — F41.9 ANXIETY: ICD-10-CM

## 2020-02-10 DIAGNOSIS — E86.0 DEHYDRATION: ICD-10-CM

## 2020-02-10 DIAGNOSIS — K29.00 ACUTE GASTRITIS WITHOUT HEMORRHAGE, UNSPECIFIED GASTRITIS TYPE: ICD-10-CM

## 2020-02-10 LAB
ALBUMIN SERPL-MCNC: 4.8 G/DL (ref 3.4–5)
ALP SERPL-CCNC: 83 U/L (ref 40–150)
ALT SERPL W P-5'-P-CCNC: 32 U/L (ref 0–70)
ANION GAP SERPL CALCULATED.3IONS-SCNC: 12 MMOL/L (ref 3–14)
ANION GAP SERPL CALCULATED.3IONS-SCNC: 6 MMOL/L (ref 3–14)
AST SERPL W P-5'-P-CCNC: 45 U/L (ref 0–45)
BASOPHILS # BLD AUTO: 0 10E9/L (ref 0–0.2)
BASOPHILS # BLD AUTO: 0 10E9/L (ref 0–0.2)
BASOPHILS NFR BLD AUTO: 0 %
BASOPHILS NFR BLD AUTO: 0.2 %
BILIRUB SERPL-MCNC: 0.9 MG/DL (ref 0.2–1.3)
BUN SERPL-MCNC: 35 MG/DL (ref 7–30)
BUN SERPL-MCNC: 41 MG/DL (ref 7–30)
CALCIUM SERPL-MCNC: 6.8 MG/DL (ref 8.5–10.1)
CALCIUM SERPL-MCNC: 9 MG/DL (ref 8.5–10.1)
CHLORIDE SERPL-SCNC: 71 MMOL/L (ref 94–109)
CHLORIDE SERPL-SCNC: 90 MMOL/L (ref 94–109)
CO2 SERPL-SCNC: 37 MMOL/L (ref 20–32)
CO2 SERPL-SCNC: 37 MMOL/L (ref 20–32)
CREAT SERPL-MCNC: 2.44 MG/DL (ref 0.66–1.25)
CREAT SERPL-MCNC: 3.38 MG/DL (ref 0.66–1.25)
DIFFERENTIAL METHOD BLD: ABNORMAL
DIFFERENTIAL METHOD BLD: ABNORMAL
EOSINOPHIL # BLD AUTO: 0 10E9/L (ref 0–0.7)
EOSINOPHIL # BLD AUTO: 0 10E9/L (ref 0–0.7)
EOSINOPHIL NFR BLD AUTO: 0 %
EOSINOPHIL NFR BLD AUTO: 0 %
ERYTHROCYTE [DISTWIDTH] IN BLOOD BY AUTOMATED COUNT: 12.1 % (ref 10–15)
ERYTHROCYTE [DISTWIDTH] IN BLOOD BY AUTOMATED COUNT: 12.5 % (ref 10–15)
ETHANOL SERPL-MCNC: <0.01 G/DL
GFR SERPL CREATININE-BSD FRML MDRD: 23 ML/MIN/{1.73_M2}
GFR SERPL CREATININE-BSD FRML MDRD: 35 ML/MIN/{1.73_M2}
GLUCOSE SERPL-MCNC: 107 MG/DL (ref 70–99)
GLUCOSE SERPL-MCNC: 131 MG/DL (ref 70–99)
HCT VFR BLD AUTO: 40.8 % (ref 40–53)
HCT VFR BLD AUTO: 50.8 % (ref 40–53)
HGB BLD-MCNC: 14.4 G/DL (ref 13.3–17.7)
HGB BLD-MCNC: 18.4 G/DL (ref 13.3–17.7)
IMM GRANULOCYTES # BLD: 0.1 10E9/L (ref 0–0.4)
IMM GRANULOCYTES NFR BLD: 0.5 %
LIPASE SERPL-CCNC: 68 U/L (ref 73–393)
LYMPHOCYTES # BLD AUTO: 1.9 10E9/L (ref 0.8–5.3)
LYMPHOCYTES # BLD AUTO: 1.9 10E9/L (ref 0.8–5.3)
LYMPHOCYTES NFR BLD AUTO: 15.1 %
LYMPHOCYTES NFR BLD AUTO: 9 %
MCH RBC QN AUTO: 31.2 PG (ref 26.5–33)
MCH RBC QN AUTO: 31.6 PG (ref 26.5–33)
MCHC RBC AUTO-ENTMCNC: 35.3 G/DL (ref 31.5–36.5)
MCHC RBC AUTO-ENTMCNC: 36.2 G/DL (ref 31.5–36.5)
MCV RBC AUTO: 86 FL (ref 78–100)
MCV RBC AUTO: 90 FL (ref 78–100)
MONOCYTES # BLD AUTO: 1.2 10E9/L (ref 0–1.3)
MONOCYTES # BLD AUTO: 1.7 10E9/L (ref 0–1.3)
MONOCYTES NFR BLD AUTO: 8 %
MONOCYTES NFR BLD AUTO: 9.3 %
NEUTROPHILS # BLD AUTO: 17.2 10E9/L (ref 1.6–8.3)
NEUTROPHILS # BLD AUTO: 9.4 10E9/L (ref 1.6–8.3)
NEUTROPHILS NFR BLD AUTO: 74.9 %
NEUTROPHILS NFR BLD AUTO: 83 %
NRBC # BLD AUTO: 0 10*3/UL
NRBC BLD AUTO-RTO: 0 /100
PLATELET # BLD AUTO: 219 10E9/L (ref 150–450)
PLATELET # BLD AUTO: 320 10E9/L (ref 150–450)
PLATELET # BLD EST: ABNORMAL 10*3/UL
POTASSIUM SERPL-SCNC: 2.8 MMOL/L (ref 3.4–5.3)
POTASSIUM SERPL-SCNC: 3 MMOL/L (ref 3.4–5.3)
PROT SERPL-MCNC: 9.5 G/DL (ref 6.8–8.8)
RBC # BLD AUTO: 4.56 10E12/L (ref 4.4–5.9)
RBC # BLD AUTO: 5.9 10E12/L (ref 4.4–5.9)
RBC MORPH BLD: NORMAL
SODIUM SERPL-SCNC: 120 MMOL/L (ref 133–144)
SODIUM SERPL-SCNC: 133 MMOL/L (ref 133–144)
WBC # BLD AUTO: 12.6 10E9/L (ref 4–11)
WBC # BLD AUTO: 20.7 10E9/L (ref 4–11)

## 2020-02-10 PROCEDURE — 25000132 ZZH RX MED GY IP 250 OP 250 PS 637: Performed by: EMERGENCY MEDICINE

## 2020-02-10 PROCEDURE — 80053 COMPREHEN METABOLIC PANEL: CPT | Performed by: EMERGENCY MEDICINE

## 2020-02-10 PROCEDURE — 99284 EMERGENCY DEPT VISIT MOD MDM: CPT | Mod: Z6 | Performed by: EMERGENCY MEDICINE

## 2020-02-10 PROCEDURE — 96365 THER/PROPH/DIAG IV INF INIT: CPT

## 2020-02-10 PROCEDURE — 85025 COMPLETE CBC W/AUTO DIFF WBC: CPT | Performed by: EMERGENCY MEDICINE

## 2020-02-10 PROCEDURE — 96361 HYDRATE IV INFUSION ADD-ON: CPT

## 2020-02-10 PROCEDURE — 80048 BASIC METABOLIC PNL TOTAL CA: CPT | Performed by: EMERGENCY MEDICINE

## 2020-02-10 PROCEDURE — 80320 DRUG SCREEN QUANTALCOHOLS: CPT | Performed by: EMERGENCY MEDICINE

## 2020-02-10 PROCEDURE — 96375 TX/PRO/DX INJ NEW DRUG ADDON: CPT

## 2020-02-10 PROCEDURE — 25000125 ZZHC RX 250: Performed by: EMERGENCY MEDICINE

## 2020-02-10 PROCEDURE — 25000128 H RX IP 250 OP 636: Performed by: EMERGENCY MEDICINE

## 2020-02-10 PROCEDURE — 25800030 ZZH RX IP 258 OP 636: Performed by: EMERGENCY MEDICINE

## 2020-02-10 PROCEDURE — 96366 THER/PROPH/DIAG IV INF ADDON: CPT

## 2020-02-10 PROCEDURE — 96367 TX/PROPH/DG ADDL SEQ IV INF: CPT

## 2020-02-10 PROCEDURE — 99285 EMERGENCY DEPT VISIT HI MDM: CPT | Mod: 25

## 2020-02-10 PROCEDURE — 83690 ASSAY OF LIPASE: CPT | Performed by: EMERGENCY MEDICINE

## 2020-02-10 RX ORDER — ONDANSETRON 4 MG/1
4 TABLET, ORALLY DISINTEGRATING ORAL EVERY 8 HOURS PRN
Qty: 12 TABLET | Refills: 2 | Status: SHIPPED | OUTPATIENT
Start: 2020-02-10 | End: 2020-02-22

## 2020-02-10 RX ORDER — POTASSIUM CHLORIDE 1.5 G/1.58G
40 POWDER, FOR SOLUTION ORAL ONCE
Status: COMPLETED | OUTPATIENT
Start: 2020-02-10 | End: 2020-02-10

## 2020-02-10 RX ORDER — ONDANSETRON 2 MG/ML
4 INJECTION INTRAMUSCULAR; INTRAVENOUS ONCE
Status: COMPLETED | OUTPATIENT
Start: 2020-02-10 | End: 2020-02-10

## 2020-02-10 RX ORDER — SODIUM CHLORIDE 9 MG/ML
1000 INJECTION, SOLUTION INTRAVENOUS CONTINUOUS
Status: DISCONTINUED | OUTPATIENT
Start: 2020-02-10 | End: 2020-02-10 | Stop reason: HOSPADM

## 2020-02-10 RX ORDER — POTASSIUM CHLORIDE 1.5 G/1.58G
20 POWDER, FOR SOLUTION ORAL 2 TIMES DAILY
Qty: 7 PACKET | Refills: 0 | Status: SHIPPED | OUTPATIENT
Start: 2020-02-10 | End: 2020-02-13

## 2020-02-10 RX ORDER — LORAZEPAM 2 MG/ML
1 INJECTION INTRAMUSCULAR ONCE
Status: COMPLETED | OUTPATIENT
Start: 2020-02-10 | End: 2020-02-10

## 2020-02-10 RX ORDER — CAPSAICIN 0.025 %
CREAM (GRAM) TOPICAL 3 TIMES DAILY
Status: DISCONTINUED | OUTPATIENT
Start: 2020-02-10 | End: 2020-02-10 | Stop reason: HOSPADM

## 2020-02-10 RX ORDER — HYDROXYZINE PAMOATE 50 MG/1
50-100 CAPSULE ORAL 3 TIMES DAILY PRN
Qty: 30 CAPSULE | Refills: 1 | Status: SHIPPED | OUTPATIENT
Start: 2020-02-10 | End: 2020-02-13

## 2020-02-10 RX ADMIN — POTASSIUM CHLORIDE 40 MEQ: 1.5 POWDER, FOR SOLUTION ORAL at 16:36

## 2020-02-10 RX ADMIN — LIDOCAINE HYDROCHLORIDE 30 ML: 20 SOLUTION ORAL; TOPICAL at 12:19

## 2020-02-10 RX ADMIN — ONDANSETRON 4 MG: 2 INJECTION INTRAMUSCULAR; INTRAVENOUS at 11:32

## 2020-02-10 RX ADMIN — PROMETHAZINE HYDROCHLORIDE 25 MG: 25 INJECTION INTRAMUSCULAR; INTRAVENOUS at 12:51

## 2020-02-10 RX ADMIN — SODIUM CHLORIDE 1000 ML: 9 INJECTION, SOLUTION INTRAVENOUS at 11:32

## 2020-02-10 RX ADMIN — SODIUM CHLORIDE 1000 ML: 9 INJECTION, SOLUTION INTRAVENOUS at 14:30

## 2020-02-10 RX ADMIN — SODIUM CHLORIDE, POTASSIUM CHLORIDE, SODIUM LACTATE AND CALCIUM CHLORIDE 1000 ML: 600; 310; 30; 20 INJECTION, SOLUTION INTRAVENOUS at 16:34

## 2020-02-10 RX ADMIN — LORAZEPAM 1 MG: 2 INJECTION INTRAMUSCULAR; INTRAVENOUS at 12:17

## 2020-02-10 RX ADMIN — FOLIC ACID: 5 INJECTION, SOLUTION INTRAMUSCULAR; INTRAVENOUS; SUBCUTANEOUS at 13:09

## 2020-02-10 RX ADMIN — FAMOTIDINE 20 MG: 20 INJECTION, SOLUTION INTRAVENOUS at 11:32

## 2020-02-10 ASSESSMENT — MIFFLIN-ST. JEOR: SCORE: 1672.53

## 2020-02-10 NOTE — ED AVS SNAPSHOT
[facility] Emergency Department  [address] [address]  Phone:  [phone]  Fax:  [phone]                                    Kyle Ayala   MRN: [id number]    Department:  [facility] Emergency Department   Date of Visit:  2/10/2020           After Visit Summary Signature Page    I have received my discharge instructions, and my questions have been answered. I have discussed any challenges I see with this plan with the nurse or doctor.    ..........................................................................................................................................  Patient/Patient Representative Signature      ..........................................................................................................................................  Patient Representative Print Name and Relationship to Patient    ..................................................               ................................................  Date                                   Time    ..........................................................................................................................................  Reviewed by Signature/Title    ...................................................              ..............................................  Date                                               Time          22EPIC Rev 08/18

## 2020-02-10 NOTE — ED PROVIDER NOTES
History     Chief Complaint   Patient presents with     Alcohol Problem     vomiting, last drink friday;      HPI  Kyle Ayala is a 29 year old male with 2 days of nausea and vomiting after quitting alcohol 2.5 days ago.  Has been drinking up to 1 pint of liquor daily.  History of alcohol dependence and abuse and prior CD treatment at [facility].  He reports he no longer wants to drink alcohol. He has anxiety and tremulousness refractory to Ativan.  Mild upper abdominal pain which she reports is from vomiting, no significant abdominal pain and no back or flank pain.  No signs or symptoms of GI bleeding.  No fever or chills. Smokes marijuana regularly. Reports previous unknown anxiety medication management.  EMR shows previous Rx of Celexa, Trazodone, Lexapro, Cymbalta, Buspirone and Ativan.  No other acute complaints or concerns.    Allergies:  Allergies   Allergen Reactions     Penicillins      Patient says he was allergic to penicllin as a child but not anymore       Problem List:    Patient Active Problem List    Diagnosis Date Noted     Alcohol withdrawal (H) 03/07/2019     Priority: Medium     Acute kidney failure (H) 03/07/2019     Priority: Medium     Metabolic acidosis, increased anion gap 03/07/2019     Priority: Medium     PAF (paroxysmal atrial fibrillation) (H) 02/29/2016     Priority: Medium     Non-rheumatic mitral regurgitation 02/29/2016     Priority: Medium     Echo 2/29/2016- Left ventricular systolic function is normal.The visual ejection fraction is estimated at 55-60%. The right ventricular systolic function is normal. There is moderate (2+) mitral regurgitation.The mitral regurgitant jet is eccentrically directed. Severity of the mitral regurgitation may be underestimated due to eccentric jet.       Acute hyperactive alcohol withdrawal delirium (H) 02/28/2016     Priority: Medium     Dehydration 02/27/2016     Priority: Medium     Hypokalemia 02/27/2016     Priority: Medium     Hyponatremia  02/27/2016     Priority: Medium     Alcohol dependence (H) 02/27/2016     Priority: Medium     Mild major depression 01/08/2015     Priority: Medium     Mild intermittent asthma 01/07/2015     Priority: Medium     Health Care Home 12/11/2014     Priority: Medium     State Tier Level:    Status:  Unable to reach  Care Coordinator:  Jacki Fernandez    See Letters for [facility] Care Plan  Date:  March 9, 2016           Anxiety 11/21/2014     Priority: Medium     Newly recognized heart murmur 07/30/2014     Priority: Medium     Diagnosis updated by automated process. Provider to review and confirm.       Vomiting 07/30/2014     Priority: Medium     Loss of weight 05/21/2014     Priority: Medium     Tobacco abuse 05/21/2014     Priority: Medium        Past Medical History:    Past Medical History:   Diagnosis Date     Acute renal failure (ARF) (H) 2/27/2016     Asthma        Past Surgical History:    Past Surgical History:   Procedure Laterality Date     LACERATION REPAIR Right     Right thigh, chain saw accident       Family History:    Family History   Problem Relation Age of Onset     Alcohol/Drug Mother      Dementia Mother      Asthma Father      Respiratory Father 54        COPD     Alcohol/Drug Father      Dementia Maternal Grandmother      Alzheimer Disease Maternal Grandfather         dementia     C.A.D. Paternal Grandfather      Heart Disease Brother         congenital heart, heart surgery when born     Alcohol/Drug Brother        Social History:  Marital Status:  Single [1]  Social History     Tobacco Use     Smoking status: Current Every Day Smoker     Packs/day: 1.00     Types: Cigarettes     Smokeless tobacco: Never Used   Substance Use Topics     Alcohol use: Yes     Drug use: Yes     Types: Marijuana     Comment: marijuana occ.        Medications:    hydrOXYzine (VISTARIL) 50 MG capsule  LORazepam (ATIVAN) 1 MG tablet  ondansetron (ZOFRAN ODT) 4 MG ODT tab  potassium chloride (KLOR-CON) 20 MEQ packet  albuterol  "(PROAIR HFA/PROVENTIL HFA/VENTOLIN HFA) 108 (90 Base) MCG/ACT inhaler        Review of Systems   Hiccups this am.  As mentioned above in the history present illness.  All other systems were reviewed and are negative.      Physical Exam   BP: 122/89  Pulse: 98  Heart Rate: 144  Temp: 97.7  F (36.5  C)  Resp: 20  Height: 180.3 cm (5' 11\")  Weight: 68 kg (150 lb)  SpO2: 96 %      Physical Exam  Vitals signs and nursing note reviewed.   Constitutional:       General: He is not in acute distress.     Appearance: Normal appearance. He is well-developed. He is not ill-appearing or diaphoretic.   HENT:      Head: Normocephalic and atraumatic.      Right Ear: External ear normal.      Left Ear: External ear normal.      Nose: Nose normal.      Mouth/Throat:      Mouth: Mucous membranes are dry.   Eyes:      General: No scleral icterus.     Extraocular Movements: Extraocular movements intact.      Conjunctiva/sclera: Conjunctivae normal.   Neck:      Musculoskeletal: Normal range of motion and neck supple.      Trachea: No tracheal deviation.   Cardiovascular:      Rate and Rhythm: Normal rate and regular rhythm.      Heart sounds: Normal heart sounds. No murmur. No friction rub. No gallop.    Pulmonary:      Effort: Pulmonary effort is normal. No respiratory distress.      Breath sounds: Normal breath sounds. No wheezing, rhonchi or rales.   Abdominal:      General: There is no distension.      Palpations: Abdomen is soft.      Tenderness: There is no abdominal tenderness.   Musculoskeletal: Normal range of motion.         General: No swelling or tenderness.      Right lower leg: No edema.      Left lower leg: No edema.   Skin:     General: Skin is warm and dry.      Coloration: Skin is not jaundiced or pale.      Findings: No erythema or rash.   Neurological:      General: No focal deficit present.      Mental Status: He is alert and oriented to person, place, and time.      Coordination: Coordination normal.   Psychiatric: "         Mood and Affect: Mood normal.         Behavior: Behavior normal.         ED Course        Procedures                 Results for orders placed or performed during the hospital encounter of 02/10/20 (from the past 24 hour(s))   Comprehensive metabolic panel   Result Value Ref Range    Sodium 120 (L) 133 - 144 mmol/L    Potassium 3.0 (L) 3.4 - 5.3 mmol/L    Chloride 71 (L) 94 - 109 mmol/L    Carbon Dioxide 37 (H) 20 - 32 mmol/L    Anion Gap 12 3 - 14 mmol/L    Glucose 131 (H) 70 - 99 mg/dL    Urea Nitrogen 41 (H) 7 - 30 mg/dL    Creatinine 3.38 (H) 0.66 - 1.25 mg/dL    GFR Estimate 23 (L) >60 mL/min/[1.73_m2]    GFR Estimate If Black 27 (L) >60 mL/min/[1.73_m2]    Calcium 9.0 8.5 - 10.1 mg/dL    Bilirubin Total 0.9 0.2 - 1.3 mg/dL    Albumin 4.8 3.4 - 5.0 g/dL    Protein Total 9.5 (H) 6.8 - 8.8 g/dL    Alkaline Phosphatase 83 40 - 150 U/L    ALT 32 0 - 70 U/L    AST 45 0 - 45 U/L   CBC with platelets differential   Result Value Ref Range    WBC 20.7 (H) 4.0 - 11.0 10e9/L    RBC Count 5.90 4.4 - 5.9 10e12/L    Hemoglobin 18.4 (H) 13.3 - 17.7 g/dL    Hematocrit 50.8 40.0 - 53.0 %    MCV 86 78 - 100 fl    MCH 31.2 26.5 - 33.0 pg    MCHC 36.2 31.5 - 36.5 g/dL    RDW 12.1 10.0 - 15.0 %    Platelet Count 320 150 - 450 10e9/L    Diff Method Manual Differential     % Neutrophils 83.0 %    % Lymphocytes 9.0 %    % Monocytes 8.0 %    % Eosinophils 0.0 %    % Basophils 0.0 %    Absolute Neutrophil 17.2 (H) 1.6 - 8.3 10e9/L    Absolute Lymphocytes 1.9 0.8 - 5.3 10e9/L    Absolute Monocytes 1.7 (H) 0.0 - 1.3 10e9/L    Absolute Eosinophils 0.0 0.0 - 0.7 10e9/L    Absolute Basophils 0.0 0.0 - 0.2 10e9/L    RBC Morphology Normal     Platelet Estimate       Automated count confirmed.  Giant platelets are present.   Alcohol level blood   Result Value Ref Range    Ethanol g/dL <0.01 <0.01 g/dL   Lipase   Result Value Ref Range    Lipase 68 (L) 73 - 393 U/L   CBC with platelets differential   Result Value Ref Range    WBC 12.6  (H) 4.0 - 11.0 10e9/L    RBC Count 4.56 4.4 - 5.9 10e12/L    Hemoglobin 14.4 13.3 - 17.7 g/dL    Hematocrit 40.8 40.0 - 53.0 %    MCV 90 78 - 100 fl    MCH 31.6 26.5 - 33.0 pg    MCHC 35.3 31.5 - 36.5 g/dL    RDW 12.5 10.0 - 15.0 %    Platelet Count 219 150 - 450 10e9/L    Diff Method Automated Method     % Neutrophils 74.9 %    % Lymphocytes 15.1 %    % Monocytes 9.3 %    % Eosinophils 0.0 %    % Basophils 0.2 %    % Immature Granulocytes 0.5 %    Nucleated RBCs 0 0 /100    Absolute Neutrophil 9.4 (H) 1.6 - 8.3 10e9/L    Absolute Lymphocytes 1.9 0.8 - 5.3 10e9/L    Absolute Monocytes 1.2 0.0 - 1.3 10e9/L    Absolute Eosinophils 0.0 0.0 - 0.7 10e9/L    Absolute Basophils 0.0 0.0 - 0.2 10e9/L    Abs Immature Granulocytes 0.1 0 - 0.4 10e9/L    Absolute Nucleated RBC 0.0    Basic metabolic panel   Result Value Ref Range    Sodium 133 133 - 144 mmol/L    Potassium 2.8 (L) 3.4 - 5.3 mmol/L    Chloride 90 (L) 94 - 109 mmol/L    Carbon Dioxide 37 (H) 20 - 32 mmol/L    Anion Gap 6 3 - 14 mmol/L    Glucose 107 (H) 70 - 99 mg/dL    Urea Nitrogen 35 (H) 7 - 30 mg/dL    Creatinine 2.44 (H) 0.66 - 1.25 mg/dL    GFR Estimate 35 (L) >60 mL/min/[1.73_m2]    GFR Estimate If Black 40 (L) >60 mL/min/[1.73_m2]    Calcium 6.8 (L) 8.5 - 10.1 mg/dL       Medications   sodium chloride 0.9% infusion (has no administration in time range)   capsaicin (ZOSTRIX) cream (has no administration in time range)   0.9% sodium chloride BOLUS (has no administration in time range)   famotidine (PEPCID) infusion 20 mg (0 mg Intravenous Stopped 2/10/20 1221)   ondansetron (ZOFRAN) injection 4 mg (4 mg Intravenous Given 2/10/20 1132)   0.9% sodium chloride BOLUS (0 mLs Intravenous Stopped 2/10/20 1255)   promethazine (PHENERGAN) 25 mg in sodium chloride 0.9 % 50 mL intermittent infusion (25 mg Intravenous Given 2/10/20 1251)   LORazepam (ATIVAN) injection 1 mg (1 mg Intravenous Given 2/10/20 1217)   lidocaine (XYLOCAINE) 2 % 15 mL, alum & mag  hydroxide-simethicone (MYLANTA ES/MAALOX  ES) 15 mL GI Cocktail (30 mLs Oral Given 2/10/20 1219)   sodium chloride 0.9 % 1,000 mL with Infuvite Adult 10 mL, thiamine 100 mg, folic acid 1 mg infusion ( Intravenous Stopped 2/10/20 1525)   0.9% sodium chloride BOLUS (0 mLs Intravenous Stopped 2/10/20 1552)   potassium chloride (KLOR-CON) Packet 40 mEq (40 mEq Oral Given 2/10/20 1636)   lactated ringers BOLUS 1,000 mL (0 mLs Intravenous Stopped 2/10/20 1742)     3:18 PM - has had 3 L IVF bolus and feeling much improved. Capsaicin cream helped significantly.  No nausea or vomiting.  Will try p.o. and repeat labs, BMP and WBC.  Tachycardia has resolved, no tremor or other acute evidence of alcohol withdrawal.    Repeat labs show decreased potassium 2.8, decreased creatinine 2.44, and much improved/decreased WBC 12.6.  Continue to IV hydrate.    Again declines admission after 4 L IV fluid bolus.  Taking p.o. without emesis and request discharge home.    Assessments & Plan (with Medical Decision Making)   2.5 days of nausea, vomiting and development of dehydration after discontinuation of alcohol use/abuse.  In addition he smokes marijuana regularly. ?  Gastritis without evidence of GI bleeding or cannabinoid hyperemesis syndrome.  He had significant symptomatic relief after CT scan cream, but also received a GI cocktail of antacid viscous lidocaine and IV Pepcid.  Significantly dehydrated with elevated BUN/creatinine which improved with IV fluids and initiation of oral fluids without emesis after medications including Zofran and Ativan.  Initial potassium 3.0, repeated was 2.8 after approximately 2.5 L IV fluids.  He was given another 1.5 IV fluids and oral potassium.  He declined admission and has much improved and stable for discharge home with plan for continued oral rehydration therapy, oral potassium supplementation, Zofran and Hydroxyzine.  No evidence of alcohol withdrawal at time of discharge.  I recommended clinic  follow-up in 2 days and repeat laboratory evaluation.  He was provided referral information for CD assessment/treatment.    I have reviewed the nursing notes.    I have reviewed the findings, diagnosis, plan and need for follow up with the patient.    New Prescriptions    HYDROXYZINE (VISTARIL) 50 MG CAPSULE    Take 1-2 capsules ( mg) by mouth 3 times daily as needed for anxiety or other (Insomnia)    ONDANSETRON (ZOFRAN ODT) 4 MG ODT TAB    Take 1 tablet (4 mg) by mouth every 8 hours as needed for nausea or vomiting    POTASSIUM CHLORIDE (KLOR-CON) 20 MEQ PACKET    Take 20 mEq by mouth 2 times daily for 7 days       Final diagnoses:   Acute gastritis without hemorrhage, unspecified gastritis type   Intractable nausea and vomiting   Dehydration   Hypokalemia   Anxiety       2/10/2020   [facility] EMERGENCY DEPARTMENT     Jose Alfredo Soto MD  02/10/20 1741

## 2020-02-10 NOTE — ED NOTES
Pt resting comfortably.  Denies muscle cramps and nausea.  Pt is alert and oriented.  VSS.  HR WNL. Call light within reach.

## 2020-02-10 NOTE — ED NOTES
"Last drink on Friday.  Pt taking Ativan at home PRN, 2.5mg.  Pt states last dose was 0700.  Pt has had continuous n/v.  Many recent life stressors within the last 3 years.  Pt has lost his mother, father and brother.  Has difficulty sleeping at night that is why patient drinks, to \"pass out.\"  Pt s/o is at bedside.  Pt is anxious, but cooperative and pleasant.  A&Ox4.  Pt is tremulous and appears to experience muscle cramps.     "

## 2020-02-12 ENCOUNTER — OFFICE VISIT (OUTPATIENT)
Dept: FAMILY MEDICINE | Facility: CLINIC | Age: 30
End: 2020-02-12
Payer: COMMERCIAL

## 2020-02-12 VITALS
HEIGHT: 71 IN | WEIGHT: 132.1 LBS | DIASTOLIC BLOOD PRESSURE: 70 MMHG | TEMPERATURE: 99.1 F | SYSTOLIC BLOOD PRESSURE: 116 MMHG | OXYGEN SATURATION: 98 % | RESPIRATION RATE: 16 BRPM | BODY MASS INDEX: 18.49 KG/M2 | HEART RATE: 119 BPM

## 2020-02-12 DIAGNOSIS — E87.6 HYPOKALEMIA: ICD-10-CM

## 2020-02-12 DIAGNOSIS — Z23 NEED FOR VACCINATION: ICD-10-CM

## 2020-02-12 DIAGNOSIS — F41.9 ANXIETY: ICD-10-CM

## 2020-02-12 LAB
ALBUMIN SERPL-MCNC: 4.4 G/DL (ref 3.4–5)
ALP SERPL-CCNC: 63 U/L (ref 40–150)
ALT SERPL W P-5'-P-CCNC: 30 U/L (ref 0–70)
ANION GAP SERPL CALCULATED.3IONS-SCNC: 6 MMOL/L (ref 3–14)
AST SERPL W P-5'-P-CCNC: 29 U/L (ref 0–45)
BILIRUB SERPL-MCNC: 1 MG/DL (ref 0.2–1.3)
BUN SERPL-MCNC: 18 MG/DL (ref 7–30)
CALCIUM SERPL-MCNC: 9.4 MG/DL (ref 8.5–10.1)
CHLORIDE SERPL-SCNC: 97 MMOL/L (ref 94–109)
CO2 SERPL-SCNC: 33 MMOL/L (ref 20–32)
CREAT SERPL-MCNC: 0.82 MG/DL (ref 0.66–1.25)
ERYTHROCYTE [DISTWIDTH] IN BLOOD BY AUTOMATED COUNT: 12.1 % (ref 10–15)
GFR SERPL CREATININE-BSD FRML MDRD: >90 ML/MIN/{1.73_M2}
GLUCOSE SERPL-MCNC: 98 MG/DL (ref 70–99)
HCT VFR BLD AUTO: 48.2 % (ref 40–53)
HGB BLD-MCNC: 16.7 G/DL (ref 13.3–17.7)
LIPASE SERPL-CCNC: 203 U/L (ref 73–393)
MCH RBC QN AUTO: 31.7 PG (ref 26.5–33)
MCHC RBC AUTO-ENTMCNC: 34.6 G/DL (ref 31.5–36.5)
MCV RBC AUTO: 92 FL (ref 78–100)
PLATELET # BLD AUTO: 211 10E9/L (ref 150–450)
POTASSIUM SERPL-SCNC: 3.2 MMOL/L (ref 3.4–5.3)
PROT SERPL-MCNC: 8.3 G/DL (ref 6.8–8.8)
RBC # BLD AUTO: 5.27 10E12/L (ref 4.4–5.9)
SODIUM SERPL-SCNC: 136 MMOL/L (ref 133–144)
WBC # BLD AUTO: 12.3 10E9/L (ref 4–11)

## 2020-02-12 PROCEDURE — 85027 COMPLETE CBC AUTOMATED: CPT | Performed by: NURSE PRACTITIONER

## 2020-02-12 PROCEDURE — 80053 COMPREHEN METABOLIC PANEL: CPT | Performed by: NURSE PRACTITIONER

## 2020-02-12 PROCEDURE — 36415 COLL VENOUS BLD VENIPUNCTURE: CPT | Performed by: NURSE PRACTITIONER

## 2020-02-12 PROCEDURE — 90714 TD VACC NO PRESV 7 YRS+ IM: CPT | Performed by: NURSE PRACTITIONER

## 2020-02-12 PROCEDURE — 99214 OFFICE O/P EST MOD 30 MIN: CPT | Mod: 25 | Performed by: NURSE PRACTITIONER

## 2020-02-12 PROCEDURE — 83690 ASSAY OF LIPASE: CPT | Performed by: NURSE PRACTITIONER

## 2020-02-12 PROCEDURE — 90471 IMMUNIZATION ADMIN: CPT | Performed by: NURSE PRACTITIONER

## 2020-02-12 ASSESSMENT — PATIENT HEALTH QUESTIONNAIRE - PHQ9
5. POOR APPETITE OR OVEREATING: NEARLY EVERY DAY
SUM OF ALL RESPONSES TO PHQ QUESTIONS 1-9: 14

## 2020-02-12 ASSESSMENT — ANXIETY QUESTIONNAIRES
3. WORRYING TOO MUCH ABOUT DIFFERENT THINGS: NEARLY EVERY DAY
2. NOT BEING ABLE TO STOP OR CONTROL WORRYING: NEARLY EVERY DAY
5. BEING SO RESTLESS THAT IT IS HARD TO SIT STILL: NEARLY EVERY DAY
GAD7 TOTAL SCORE: 21
7. FEELING AFRAID AS IF SOMETHING AWFUL MIGHT HAPPEN: NEARLY EVERY DAY
IF YOU CHECKED OFF ANY PROBLEMS ON THIS QUESTIONNAIRE, HOW DIFFICULT HAVE THESE PROBLEMS MADE IT FOR YOU TO DO YOUR WORK, TAKE CARE OF THINGS AT HOME, OR GET ALONG WITH OTHER PEOPLE: EXTREMELY DIFFICULT
6. BECOMING EASILY ANNOYED OR IRRITABLE: NEARLY EVERY DAY
1. FEELING NERVOUS, ANXIOUS, OR ON EDGE: NEARLY EVERY DAY

## 2020-02-12 ASSESSMENT — MIFFLIN-ST. JEOR: SCORE: 1591.33

## 2020-02-12 NOTE — PATIENT INSTRUCTIONS
Labs today    Vistaril can help with insomnia, will also consider Gabapentin if kidney function normal, or better    Will consider other medications for anxiety treatment if Gabapentin not effective    Can start Effexor

## 2020-02-12 NOTE — PROGRESS NOTES
"Subjective     Kyle Ayala is a 29 year old male who presents to clinic today for the following health issues: the patient with history of anxiety, alcohol abuse, SORAIDA, recent ED visit due to alcohol withdrawal here for follow up.   He states he is feeling better, but complains of anxiety and insomnia, states he is usually able to get 2-3 hrs of sleep per night.  He tried many different serotonin specific reuptake inhibitor's in the past for anxiety treatment, tried Buspar and states it was not effective. He was intermittently prescribed Lorazepam for treatment of alcohol withdrawals. He was in ED multiple times over the last year due to alcohol abuse and was admitted in March last year for the same reason. The patient reports he tried treatment for alcohol abuse in the past, he states the main reason why he is drinking is anxiety.     HPI   ED/UC Followup:    Facility: [facility]   Date of visit: 2/10/20   Reason for visit: Acute gastritis without hemorrhage, unspecified gastritis type, Nausea and vomiting, Dehydration, Anxiety, Hypokalemia   Current Status: States he is feeling better but still has a lot of anxiety and not being able to sleep.  Was prescribed Vistaril, states it has not been helpful.          Reviewed and updated as needed this visit by Provider         Review of Systems   ROS COMP: Constitutional, HEENT, cardiovascular, pulmonary, gi and gu systems are negative, except as otherwise noted.      Objective    /70 (BP Location: Right arm, Patient Position: Sitting, Cuff Size: Adult Regular)   Pulse 119   Temp 99.1  F (37.3  C) (Tympanic)   Resp 16   Ht 1.803 m (5' 11\")   Wt 59.9 kg (132 lb 1.6 oz)   SpO2 98%   BMI 18.42 kg/m    Body mass index is 18.42 kg/m .  Physical Exam   GENERAL: healthy, alert and no distress  EYES: Eyes grossly normal to inspection, PERRL and conjunctivae and sclerae normal  RESP: lungs clear to auscultation - no rales, rhonchi or wheezes  CV: regular rate and rhythm, " normal S1 S2, no S3 or S4, no murmur, click or rub, no peripheral edema and peripheral pulses strong  MS: no gross musculoskeletal defects noted, no edema  SKIN: no suspicious lesions or rashes  NEURO: Normal strength and tone, mentation intact and speech normal  PSYCH: mentation appears normal, affect normal/bright    Diagnostic Test Results:  Labs reviewed in Epic  Results for orders placed or performed in visit on 02/12/20 (from the past 24 hour(s))   Comprehensive metabolic panel (BMP + Alb, Alk Phos, ALT, AST, Total. Bili, TP)   Result Value Ref Range    Sodium 136 133 - 144 mmol/L    Potassium 3.2 (L) 3.4 - 5.3 mmol/L    Chloride 97 94 - 109 mmol/L    Carbon Dioxide 33 (H) 20 - 32 mmol/L    Anion Gap 6 3 - 14 mmol/L    Glucose 98 70 - 99 mg/dL    Urea Nitrogen 18 7 - 30 mg/dL    Creatinine 0.82 0.66 - 1.25 mg/dL    GFR Estimate >90 >60 mL/min/[1.73_m2]    GFR Estimate If Black >90 >60 mL/min/[1.73_m2]    Calcium 9.4 8.5 - 10.1 mg/dL    Bilirubin Total 1.0 0.2 - 1.3 mg/dL    Albumin 4.4 3.4 - 5.0 g/dL    Protein Total 8.3 6.8 - 8.8 g/dL    Alkaline Phosphatase 63 40 - 150 U/L    ALT 30 0 - 70 U/L    AST 29 0 - 45 U/L   CBC with platelets   Result Value Ref Range    WBC 12.3 (H) 4.0 - 11.0 10e9/L    RBC Count 5.27 4.4 - 5.9 10e12/L    Hemoglobin 16.7 13.3 - 17.7 g/dL    Hematocrit 48.2 40.0 - 53.0 %    MCV 92 78 - 100 fl    MCH 31.7 26.5 - 33.0 pg    MCHC 34.6 31.5 - 36.5 g/dL    RDW 12.1 10.0 - 15.0 %    Platelet Count 211 150 - 450 10e9/L   Lipase   Result Value Ref Range    Lipase 203 73 - 393 U/L           Assessment & Plan     1. Need for vaccination    - TD PRSERV FREE >=7 YRS ADS IM [[id number]]  - 1st  Administration  [01815]    2. Alcoholism /alcohol abuse (H)    - Comprehensive metabolic panel (BMP + Alb, Alk Phos, ALT, AST, Total. Bili, TP)  - CBC with platelets  - Lipase  - gabapentin (NEURONTIN) 300 MG capsule; Take 1 capsule (300 mg) by mouth 3 times daily  Dispense: 90 capsule; Refill: 0    3.  Hypokalemia    - potassium chloride ER (K-DUR/KLOR-CON M) 10 MEQ CR tablet; Take 1 tablet (10 mEq) by mouth 2 times daily for 7 days  Dispense: 14 tablet; Refill: 0    4. Anxiety  -tried Celexa, Zoloft, Cymbalta, Lexapro, Buspar-states without any improvement. Recommended to try Vistaril and start Gabapentin, will also consider Effexor if still no improvement, recommended follow up in 2-3 weeks   - gabapentin (NEURONTIN) 300 MG capsule; Take 1 capsule (300 mg) by mouth 3 times daily  Dispense: 90 capsule; Refill: 0  - hydrOXYzine (VISTARIL) 50 MG capsule; Take 1-2 capsules ( mg) by mouth 3 times daily as needed for anxiety or other (Insomnia)  Dispense: 30 capsule; Refill: 1         See Patient Instructions    Return in about 2 weeks (around 2/26/2020), or if symptoms worsen or fail to improve.    YOSEF Ramirez [facility]

## 2020-02-12 NOTE — NURSING NOTE
Prior to immunization administration, verified patients identity using patient s name and date of birth. Please see Immunization Activity for additional information.     Screening Questionnaire for Adult Immunization    Are you sick today?   No   Do you have allergies to medications, food, a vaccine component or latex?   No   Have you ever had a serious reaction after receiving a vaccination?   No   Do you have a long-term health problem with heart, lung, kidney, or metabolic disease (e.g., diabetes), asthma, a blood disorder, no spleen, complement component deficiency, a cochlear implant, or a spinal fluid leak?  Are you on long-term aspirin therapy?   No   Do you have cancer, leukemia, HIV/AIDS, or any other immune system problem?   No   Do you have a parent, brother, or sister with an immune system problem?   No   In the past 3 months, have you taken medications that affect  your immune system, such as prednisone, other steroids, or anticancer drugs; drugs for the treatment of rheumatoid arthritis, Crohn s disease, or psoriasis; or have you had radiation treatments?   No   Have you had a seizure, or a brain or other nervous system problem?   No   During the past year, have you received a transfusion of blood or blood    products, or been given immune (gamma) globulin or antiviral drug?   No   For women: Are you pregnant or is there a chance you could become       pregnant during the next month?   No   Have you received any vaccinations in the past 4 weeks?   No     Immunization questionnaire answers were all negative.        Per orders of  , injection of TD  given by Rina Barbosa CMA. Patient instructed to remain in clinic for 15 minutes afterwards, and to report any adverse reaction to me immediately.       Screening performed by Rina Barbosa CMA on 2/12/2020 at 1:12 PM.

## 2020-02-13 RX ORDER — HYDROXYZINE PAMOATE 50 MG/1
50-100 CAPSULE ORAL 3 TIMES DAILY PRN
Qty: 30 CAPSULE | Refills: 1 | Status: SHIPPED | OUTPATIENT
Start: 2020-02-13 | End: 2021-03-03

## 2020-02-13 RX ORDER — POTASSIUM CHLORIDE 750 MG/1
10 TABLET, EXTENDED RELEASE ORAL 2 TIMES DAILY
Qty: 14 TABLET | Refills: 0 | Status: SHIPPED | OUTPATIENT
Start: 2020-02-13 | End: 2020-02-20

## 2020-02-13 RX ORDER — GABAPENTIN 300 MG/1
300 CAPSULE ORAL 3 TIMES DAILY
Qty: 90 CAPSULE | Refills: 0 | Status: ON HOLD | OUTPATIENT
Start: 2020-02-13 | End: 2021-02-26

## 2020-02-13 ASSESSMENT — ANXIETY QUESTIONNAIRES: GAD7 TOTAL SCORE: 21

## 2020-02-13 ASSESSMENT — ASTHMA QUESTIONNAIRES: ACT_TOTALSCORE: 22

## 2020-03-02 ENCOUNTER — HEALTH MAINTENANCE LETTER (OUTPATIENT)
Age: 30
End: 2020-03-02

## 2020-07-17 ENCOUNTER — VIRTUAL VISIT (OUTPATIENT)
Dept: FAMILY MEDICINE | Facility: OTHER | Age: 30
End: 2020-07-17

## 2020-07-17 NOTE — PROGRESS NOTES
"Date: 2020 10:41:38  Clinician: Burke Pena  Clinician NPI: [id number]  Patient: Kyle Ayala  Patient : 1990  Patient Address: [address] [place] [address], [place] [address]  Patient Phone: [phone]  Visit Protocol: URI  Patient Summary:  Kyle is a 30 year old ( : 1990 ) male who initiated a Visit for cold, sinus infection, or influenza. When asked the question \"Please sign me up to receive news, health information and promotions. \", Kyle responded \"Yes\".    Kyle states his symptoms started suddenly 5-6 days ago.   His symptoms consist of wheezing, tooth pain, rhinitis, malaise, chills, a sore throat, enlarged lymph nodes, myalgia, facial pain or pressure, and a cough. He is experiencing mild difficulty breathing with activities but can speak normally in full sentences. Kyle also feels feverish.   Symptom details     Nasal secretions: The color of his mucus is clear.    Cough: Kyle coughs a few times an hour and his cough is not more bothersome at night. Phlegm comes into his throat when he coughs. He does not believe his cough is caused by post-nasal drip. The color of the phlegm is clear.     Sore throat: Kyle reports having moderate throat pain (4-6 on a 10 point pain scale), does not have exudate on his tonsils, and can swallow liquids. The lymph nodes in his neck are enlarged. A rash has not appeared on the skin since the sore throat started.     Temperature: His current temperature is 99.1 degrees Fahrenheit.     Wheezing: Kyle has been diagnosed with asthma. Additional wheezing details as reported by the patient (free text): I notice a wheeze after being active and trying to rest       Facial pain or pressure: The facial pain or pressure does not feel worse when bending or leaning forward.     Tooth pain: The tooth pain is caused by a cavity, recent dental work, or other mouth problems.      Kyle denies having nausea, ageusia, diarrhea, vomiting, ear pain, " headache, anosmia, and nasal congestion. He also denies having recent facial or sinus surgery in the past 60 days, taking antibiotic medication in the past month, having a sinus infection within the past year, and double sickening (worsening symptoms after initial improvement).   Precipitating events  Kyle is not sure if he has been exposed to someone with strep throat. He has not recently been exposed to someone with influenza. Kyle has not been in close contact with any high risk individuals.   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   He does not work or volunteer as healthcare worker or a  and does not work or volunteer in a healthcare facility.   Kyle has not lived in a congregate living setting in the past 14 days. He does not live with a healthcare worker.   Kyle has not had a close contact with a laboratory-confirmed COVID-19 patient within 14 days of symptom onset.   Pertinent medical history  Kyle needs a return to work/school note.   Weight: 140 lbs   Kyle smokes or uses smokeless tobacco.   Additional information as reported by the patient (free text): Mouth pain comes from impacted wisdom teeth   Weight: 140 lbs    MEDICATIONS: No current medications, ALLERGIES: NKDA  Clinician Response:  Dear Kyle,   Your symptoms show that you could possibly have coronavirus (COVID-19). This illness can cause fever, cough and trouble breathing. Many people get a mild case and get better on their own. Some people can get very sick.  What should I do?  We would like to test you for this virus.   1. Please call [phone] to schedule your visit. Explain that you were [facility] by [facility] to have a COVID-19 test. Be ready to share your [facility] visit ID number.  The following will serve as your written order for this COVID Test, ordered by me, for the indication of suspected COVID [Z20.828]: The test will be  "ordered in [employer], our electronic health record, after you are scheduled. It will show as ordered and authorized by Burke Pena MD.  Order: COVID-19 (Coronavirus) PCR for SYMPTOMATIC testing from [facility].      2. When it's time for your COVID test:  Stay at least 6 feet away from others. (If someone will drive you to your test, stay in the backseat, as far away from the  as you can.)   Cover your mouth and nose with a mask, tissue or washcloth.  Go straight to the testing site. Don't make any stops on the way there or back.      3.Starting now: Stay home and away from others (self-isolate) until:   You've had no fever---and no medicine that reduces fever---for 3 full days (72 hours). And...   Your other symptoms have gotten better. For example, your cough or breathing has improved. And...   At least 10 days have passed since your symptoms started.       During this time, don't leave the house except for testing or medical care.   Stay in your own room, even for meals. Use your own bathroom if you can.   Stay away from others in your home. No hugging, kissing or shaking hands. No visitors.  Don't go to work, school or anywhere else.    Clean \"high touch\" surfaces often (doorknobs, counters, handles, etc.). Use a household cleaning spray or wipes. You'll find a full list of  on the EPA website: www.epa.gov/pesticide-registration/list-n-disinfectants-use-against-sars-cov-2.   Cover your mouth and nose with a mask, tissue or washcloth to avoid spreading germs.  Wash your hands and face often. Use soap and water.  Caregivers in these groups are at risk for severe illness due to COVID-19:  o People 65 years and older  o People who live in a nursing home or long-term care facility  o People with chronic disease (lung, heart, cancer, diabetes, kidney, liver, immunologic)  o People who have a weakened immune system, including those who:   Are in cancer treatment  Take medicine that weakens the immune system, such " as corticosteroids  Had a bone marrow or organ transplant  Have an immune deficiency  Have poorly controlled HIV or AIDS  Are obese (body mass index of 40 or higher)  Smoke regularly   o Caregivers should wear gloves while washing dishes, handling laundry and cleaning bedrooms and bathrooms.  o Use caution when washing and drying laundry: Don't shake dirty laundry, and use the warmest water setting that you can.  o For more tips, go to www.cdc.gov/coronavirus/2019-ncov/downloads/10Things.pdf.    4.Sign up for [employer]. We know it's scary to hear that you might have COVID-19. We want to track your symptoms to make sure you're okay over the next 2 weeks. Please look for an email from [employer]---this is a free, online program that we'll use to keep in touch. To sign up, follow the link in the email. Learn more at [email or web address]  How can I take care of myself?   Get lots of rest. Drink extra fluids (unless a doctor has told you not to).   Take Tylenol (acetaminophen) for fever or pain. If you have liver or kidney problems, ask your family doctor if it's okay to take Tylenol.   Adults can take either:    650 mg (two 325 mg pills) every 4 to 6 hours, or...   1,000 mg (two 500 mg pills) every 8 hours as needed.    Note: Don't take more than 3,000 mg in one day. Acetaminophen is found in many medicines (both prescribed and over-the-counter medicines). Read all labels to be sure you don't take too much.   For children, check the Tylenol bottle for the right dose. The dose is based on the child's age or weight.    If you have other health problems (like cancer, heart failure, an organ transplant or severe kidney disease): Call your specialty clinic if you don't feel better in the next 2 days.       Know when to call 911. Emergency warning signs include:    Trouble breathing or shortness of breath Pain or pressure in the chest that doesn't go away Feeling confused like you haven't felt before, or not  being able to wake up Bluish-colored lips or face.  Where can I get more information?    [email or web address] -- About COVID-19: [email or web address]   CDC -- What to Do If You're Sick: www.cdc.gov/coronavirus/2019-ncov/about/steps-when-sick.html   [facility] -- Ending Home Isolation: www.cdc.gov/coronavirus/2019-ncov/hcp/disposition-in-home-patients.html   [facility] -- Caring for Someone: www.cdc.gov/coronavirus/2019-ncov/if-you-are-sick/care-for-someone.html   [facility] -- Interim Guidance for Hospital Discharge to Home: www.[facility].[place]./diseases/coronavirus/hcp/hospdischarge.pdf   [facility] clinical trials (COVID-19 research studies): clinicalaffairs.[facility]-clinical-trials    Below are the COVID-19 hotlines at the [place] Department of Health ([facility]). Interpreters are available.    For health questions: Call 261-480-5274 or 1-908.969.6285 (7 a.m. to 7 p.m.) For questions about schools and childcare: Call 266-555-9890 or 1-125.942.3482 (7 a.m. to 7 p.m.)    Diagnosis: Cough  Diagnosis ICD: R05

## 2020-07-19 DIAGNOSIS — Z20.822 ENCOUNTER FOR LABORATORY TESTING FOR COVID-19 VIRUS: Primary | ICD-10-CM

## 2020-07-19 PROCEDURE — U0003 INFECTIOUS AGENT DETECTION BY NUCLEIC ACID (DNA OR RNA); SEVERE ACUTE RESPIRATORY SYNDROME CORONAVIRUS 2 (SARS-COV-2) (CORONAVIRUS DISEASE [COVID-19]), AMPLIFIED PROBE TECHNIQUE, MAKING USE OF HIGH THROUGHPUT TECHNOLOGIES AS DESCRIBED BY CMS-2020-01-R: HCPCS | Performed by: FAMILY MEDICINE

## 2020-07-19 NOTE — LETTER
July 22, 2020        Kyle [address]   [facility] [id number]    This letter provides a written record that you were tested for COVID-19 on 7/19/2020.       Your result was negative. This means that we didn t find the virus that causes COVID-19 in your sample. A test may show negative when you do actually have the virus. This can happen when the virus is in the early stages of infection, before you feel illness symptoms.    If you have symptoms   Stay home and away from others (self-isolate) until you meet ALL of the guidelines below:    You ve had no fever--and no medicine that reduces fever--for 3 full days (72 hours). And      Your other symptoms have gotten better. For example, your cough or breathing has improved. And     At least 10 days have passed since your symptoms started.    During this time:    Stay home. Don t go to work, school or anywhere else.     Stay in your own room, including for meals. Use your own bathroom if you can.    Stay away from others in your home. No hugging, kissing or shaking hands. No visitors.    Clean  high touch  surfaces often (doorknobs, counters, handles, etc.). Use a household cleaning spray or wipes. You can find a full list on the EPA website at www.epa.gov/pesticide-registration/list-n-disinfectants-use-against-sars-cov-2.    Cover your mouth and nose with a mask, tissue or washcloth to avoid spreading germs.    Wash your hands and face often with soap and water.    Going back to work  Check with your employer for any guidelines to follow for going back to work.    Employers: This document serves as formal notice that your employee tested negative for COVID-19, as of the testing date shown above.

## 2020-07-20 LAB
SARS-COV-2 RNA SPEC QL NAA+PROBE: NOT DETECTED
SPECIMEN SOURCE: NORMAL

## 2020-09-21 ENCOUNTER — VIRTUAL VISIT (OUTPATIENT)
Dept: FAMILY MEDICINE | Facility: OTHER | Age: 30
End: 2020-09-21

## 2020-09-21 NOTE — PROGRESS NOTES
"Date: 2020 09:36:03  Clinician: Malvin Khan  Clinician NPI: [id number]  Patient: Kyle Ayala  Patient : 1990  Patient Address: [address] [place] [address], [place] [address]  Patient Phone: [phone]  Visit Protocol: URI  Patient Summary:  Kyle is a 30 year old ( : 1990 ) male who initiated a [facility] Visit for COVID-19 (Coronavirus) evaluation and screening. When asked the question \"Please sign me up to receive news, health information and promotions. \", Kyle responded \"No\".    Kyle states his symptoms started 1-2 days ago.   His symptoms consist of malaise, a cough, nasal congestion, rhinitis, and myalgia. He is experiencing mild difficulty breathing with activities but can speak normally in full sentences.   Symptom details     Nasal secretions: The color of his mucus is clear.    Cough: Kyle coughs every 5-10 minutes and his cough is not more bothersome at night. Phlegm comes into his throat when he coughs. He does not believe his cough is caused by post-nasal drip. The color of the phlegm is clear.      Kyle denies having chills, facial pain or pressure, fever, sore throat, teeth pain, ageusia, diarrhea, headache, ear pain, anosmia, vomiting, nausea, wheezing, and enlarged lymph nodes. He also denies taking antibiotic medication in the past month and having recent facial or sinus surgery in the past 60 days.   Precipitating events  He has recently been exposed to someone with influenza. Kyle has not been in close contact with any high risk individuals.   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   He does not work or volunteer as healthcare worker or a  and does not work or volunteer in a healthcare facility. Additional job details as reported by the patient (free text): [employer]   Kyle has not lived in a congregate living setting in the past 14 days. He does not live " with a healthcare worker.   Kyle has had a close contact with a laboratory-confirmed COVID-19 patient within 14 days of symptom onset. He was exposed at his work. Additional information about contact with COVID-19 (Coronavirus) patient as reported by the patient (free text): I work in same home as Covid 19 confirmed employee.   Since December 2019, Kyle and has not had upper respiratory infection or influenza-like illness. Has not been diagnosed with lab-confirmed COVID-19 test   Pertinent medical history  Kyle needs a return to work/school note.   Weight: 140 lbs   Kyle smokes or uses smokeless tobacco.   Weight: 140 lbs    MEDICATIONS: No current medications, ALLERGIES: Penicillins  Clinician Response:  Dear Kyle,   Your symptoms show that you may have coronavirus (COVID-19). This illness can cause fever, cough and trouble breathing. Many people get a mild case and get better on their own. Some people can get very sick.  What should I do?  We would like to test you for this virus.   1. Please call [phone] to schedule your visit. Explain that you were referred by [facility] to have a COVID-19 test. Be ready to share your [facility] visit ID number.  The following will serve as your written order for this COVID Test, ordered by me, for the indication of suspected COVID [Z20.828]: The test will be ordered in [employer], our electronic health record, after you are scheduled. It will show as ordered and authorized by Burke Pena MD.  Order: COVID-19 (Coronavirus) PCR for SYMPTOMATIC testing from [facility].      2. When it's time for your COVID test:  Stay at least 6 feet away from others. (If someone will drive you to your test, stay in the backseat, as far away from the  as you can.)   Cover your mouth and nose with a mask, tissue or washcloth.  Go straight to the testing site. Don't make any stops on the way there or back.      3.Starting now: Stay home and away from others (self-isolate) until:   You've had  "no fever---and no medicine that reduces fever---for one full day (24 hours). And...   Your other symptoms have gotten better. For example, your cough or breathing has improved. And...   At least 10 days have passed since your symptoms started.       During this time, don't leave the house except for testing or medical care.   Stay in your own room, even for meals. Use your own bathroom if you can.   Stay away from others in your home. No hugging, kissing or shaking hands. No visitors.  Don't go to work, school or anywhere else.    Clean \"high touch\" surfaces often (doorknobs, counters, handles, etc.). Use a household cleaning spray or wipes. You'll find a full list of  on the EPA website: www.epa.gov/pesticide-registration/list-n-disinfectants-use-against-sars-cov-2.   Cover your mouth and nose with a mask, tissue or washcloth to avoid spreading germs.  Wash your hands and face often. Use soap and water.  Caregivers in these groups are at risk for severe illness due to COVID-19:  o People 65 years and older  o People who live in a nursing home or long-term care facility  o People with chronic disease (lung, heart, cancer, diabetes, kidney, liver, immunologic)  o People who have a weakened immune system, including those who:   Are in cancer treatment  Take medicine that weakens the immune system, such as corticosteroids  Had a bone marrow or organ transplant  Have an immune deficiency  Have poorly controlled HIV or AIDS  Are obese (body mass index of 40 or higher)  Smoke regularly   o Caregivers should wear gloves while washing dishes, handling laundry and cleaning bedrooms and bathrooms.  o Use caution when washing and drying laundry: Don't shake dirty laundry, and use the warmest water setting that you can.  o For more tips, go to www.cdc.gov/coronavirus/2019-ncov/downloads/10Things.pdf.    4.Sign up for [employer]. We know it's scary to hear that you might have COVID-19. We want to track your symptoms to " make sure you're okay over the next 2 weeks. Please look for an email from [employer]---this is a free, online program that we'll use to keep in touch. To sign up, follow the link in the email. Learn more at [email or web address]  How can I take care of myself?   Get lots of rest. Drink extra fluids (unless a doctor has told you not to).   Take Tylenol (acetaminophen) for fever or pain. If you have liver or kidney problems, ask your family doctor if it's okay to take Tylenol.   Adults can take either:    650 mg (two 325 mg pills) every 4 to 6 hours, or...   1,000 mg (two 500 mg pills) every 8 hours as needed.    Note: Don't take more than 3,000 mg in one day. Acetaminophen is found in many medicines (both prescribed and over-the-counter medicines). Read all labels to be sure you don't take too much.   For children, check the Tylenol bottle for the right dose. The dose is based on the child's age or weight.    If you have other health problems (like cancer, heart failure, an organ transplant or severe kidney disease): Call your specialty clinic if you don't feel better in the next 2 days.       Know when to call 911. Emergency warning signs include:    Trouble breathing or shortness of breath Pain or pressure in the chest that doesn't go away Feeling confused like you haven't felt before, or not being able to wake up Bluish-colored lips or face.  Where can I get more information?   [facility] -- About COVID-19: [email or web address]   CDC -- What to Do If You're Sick: www.cdc.gov/coronavirus/2019-ncov/about/steps-when-sick.html   CDC -- Ending Home Isolation: www.cdc.gov/coronavirus/2019-ncov/hcp/disposition-in-home-patients.html   CDC -- Caring for Someone: www.cdc.gov/coronavirus/2019-ncov/if-you-are-sick/care-for-someone.html   [facility] -- Interim Guidance for Hospital Discharge to Home: www.health.[facility].[place]/diseases/coronavirus/hcp/hospdischarge.pdf   [facility]  trials (COVID-19 research studies): clinicalaffairs.[facility]/n-clinical-trials    Below are the COVID-19 hotlines at the [place] Department of Health ([facility]). Interpreters are available.    For health questions: Call 934-303-3393 or 1-175.860.2826 (7 a.m. to 7 p.m.) For questions about schools and childcare: Call 939-430-1012 or 1-384.505.9451 (7 a.m. to 7 p.m.)    Diagnosis: Acute upper respiratory infection, unspecified  Diagnosis ICD: J06.9  Additional Clinician Notes:   If your symptoms are not improving or worsen, please go to one of our urgent care locations for evaluation and possible strep and influenza testing.

## 2020-09-22 DIAGNOSIS — Z20.822 ENCOUNTER FOR LABORATORY TESTING FOR COVID-19 VIRUS: Primary | ICD-10-CM

## 2020-09-22 PROCEDURE — U0003 INFECTIOUS AGENT DETECTION BY NUCLEIC ACID (DNA OR RNA); SEVERE ACUTE RESPIRATORY SYNDROME CORONAVIRUS 2 (SARS-COV-2) (CORONAVIRUS DISEASE [COVID-19]), AMPLIFIED PROBE TECHNIQUE, MAKING USE OF HIGH THROUGHPUT TECHNOLOGIES AS DESCRIBED BY CMS-2020-01-R: HCPCS | Performed by: FAMILY MEDICINE

## 2020-09-23 LAB
SARS-COV-2 RNA SPEC QL NAA+PROBE: NOT DETECTED
SPECIMEN SOURCE: NORMAL

## 2020-12-12 ENCOUNTER — VIRTUAL VISIT (OUTPATIENT)
Dept: FAMILY MEDICINE | Facility: OTHER | Age: 30
End: 2020-12-12

## 2020-12-12 NOTE — PROGRESS NOTES
"Date: 2020 17:27:34  Clinician: Jacki Cullen  Clinician NPI: [id number]  Patient: Kyle Ayala  Patient : 1990  Patient Address: [address] [place] [address], [place] [address]  Patient Phone: [phone]  Visit Protocol: URI  Patient Summary:  Kyle is a 30 year old ( : 1990 ) male who initiated a [facility] Visit for COVID-19 (Coronavirus) evaluation and screening. When asked the question \"Please sign me up to receive news, health information and promotions. \", Kyle responded \"No\".    Kyle states his symptoms started gradually 3-4 days ago.   His symptoms consist of a cough and malaise.   Symptom details   Cough: Kyle coughs every 5-10 minutes and his cough is not more bothersome at night. Phlegm comes into his throat when he coughs. He does not believe his cough is caused by post-nasal drip. The color of the phlegm is clear.    Kyle denies having ear pain, headache, wheezing, fever, nasal congestion, nausea, vomiting, rhinitis, facial pain or pressure, myalgias, chills, sore throat, teeth pain, ageusia, diarrhea, and anosmia. He also denies taking antibiotic medication in the past month, having recent facial or sinus surgery in the past 60 days, and double sickening (worsening symptoms after initial improvement). He is not experiencing dyspnea.   Precipitating events  He has not recently been exposed to someone with influenza. Kyle has been in close contact with the following high risk individuals: people with asthma, heart disease or diabetes.   Pertinent COVID-19 (Coronavirus) information  Kyle does not work or volunteer as healthcare worker or a . 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   Kyle has not had a close contact with a laboratory-confirmed COVID-19 patient within 14 days " of symptom onset.    Since December 2019, Kyle has been tested for COVID-19 and has not had upper respiratory infection or influenza-like illness.      Result of COVID-19 test: Negative     Date of his COVID-19 test: 08/01/2019      Pertinent medical history  Kyle has asthma. He uses quick-relief inhaler less than two times per week. He refills his quick-relief inhaler less than two times per year. He wakes up at night with asthma symptoms less than two times per month.   He has not been told by his provider to avoid NSAIDs.   Kyle does not have diabetes. He denies having immunosuppressive conditions (e.g., chemotherapy, HIV, organ transplant, long-term use of steroids or other immunosuppressive medications, splenectomy). He does not have severe COPD and congestive heart failure.   Kyle needs a return to work/school note.   Weight: 155 lbs   Kyle smokes or uses smokeless tobacco.   Weight: 155 lbs    MEDICATIONS: No current medications, ALLERGIES: Penicillins  Clinician Response:  Dear Kyle,   Your symptoms show that you may have coronavirus (COVID-19). This illness can cause fever, cough and trouble breathing. Many people get a mild case and get better on their own. Some people can get very sick.  What should I do?  We would like to test you for this virus.   1. Please call [phone] to schedule your visit. Explain that [facility]. Be ready to share your [facility] visit ID number.  * If you need to schedule in [facility] please call [phone] or for [employer] employees please call [phone].  * If you need to schedule in the [place] area please call [phone]. [place] employees call [phone].  The following will serve as your written order for this COVID Test, ordered by me, for the indication of suspected COVID [Z20.828]: The test will be ordered in [facility], our electronic health record, after you are scheduled. It will show as ordered and  "authorized by Burke Pena MD.  Order: COVID-19 (Coronavirus) PCR for SYMPTOMATIC testing from [facility].   2. When it's time for your COVID test:  Stay at least 6 feet away from others. (If someone will drive you to your test, stay in the backseat, as far away from the  as you can.)   Cover your mouth and nose with a mask, tissue or washcloth.  Go straight to the testing site. Don't make any stops on the way there or back.      3.Starting now: Stay home and away from others (self-isolate) until:   You've had no fever---and no medicine that reduces fever---for one full day (24 hours). And...   Your other symptoms have gotten better. For example, your cough or breathing has improved. And...   At least 10 days have passed since your symptoms started.       During this time, don't leave the house except for testing or medical care.   Stay in your own room, even for meals. Use your own bathroom if you can.   Stay away from others in your home. No hugging, kissing or shaking hands. No visitors.  Don't go to work, school or anywhere else.    Clean \"high touch\" surfaces often (doorknobs, counters, handles, etc.). Use a household cleaning spray or wipes. You'll find a full list of  on the EPA website: www.epa.gov/pesticide-registration/list-n-disinfectants-use-against-sars-cov-2.   Cover your mouth and nose with a mask, tissue or washcloth to avoid spreading germs.  Wash your hands and face often. Use soap and water.  Caregivers in these groups are at risk for severe illness due to COVID-19:  o People 65 years and older  o People who live in a nursing home or long-term care facility  o People with chronic disease (lung, heart, cancer, diabetes, kidney, liver, immunologic)  o People who have a weakened immune system, including those who:   Are in cancer treatment  Take medicine that weakens the immune system, such as corticosteroids  Had a bone marrow or organ transplant  Have an immune deficiency  Have poorly " controlled HIV or AIDS  Are obese (body mass index of 40 or higher)  Smoke regularly   o Caregivers should wear gloves while washing dishes, handling laundry and cleaning bedrooms and bathrooms.  o Use caution when washing and drying laundry: Don't shake dirty laundry, and use the warmest water setting that you can.  o For more tips, go to www.cdc.gov/coronavirus/2019-ncov/downloads/10Things.pdf.    4.Sign up for [employer]. We know it's scary to hear that you might have COVID-19. We want to track your symptoms to make sure you're okay over the next 2 weeks. Please look for an email from [employer]---this is a free, online program that we'll use to keep in touch. To sign up, follow the link in the email. Learn more at [email or web address]  How can I take care of myself?   Get lots of rest. Drink extra fluids (unless a doctor has told you not to).   Take Tylenol (acetaminophen) for fever or pain. If you have liver or kidney problems, ask your family doctor if it's okay to take Tylenol.   Adults can take either:    650 mg (two 325 mg pills) every 4 to 6 hours, or...   1,000 mg (two 500 mg pills) every 8 hours as needed.    Note: Don't take more than 3,000 mg in one day. Acetaminophen is found in many medicines (both prescribed and over-the-counter medicines). Read all labels to be sure you don't take too much.   For children, check the Tylenol bottle for the right dose. The dose is based on the child's age or weight.    If you have other health problems (like cancer, heart failure, an organ transplant or severe kidney disease): Call your specialty clinic if you don't feel better in the next 2 days.       Know when to call 911. Emergency warning signs include:    Trouble breathing or shortness of breath Pain or pressure in the chest that doesn't go away Feeling confused like you haven't felt before, or not being able to wake up Bluish-colored lips or face.  Where can I get more information?   [facility]  [facility] -- About COVID-19: [email or web address]   CDC -- What to Do If You're Sick: www.cdc.gov/coronavirus/2019-ncov/about/steps-when-sick.html   CDC -- Ending Home Isolation: www.cdc.gov/coronavirus/2019-ncov/hcp/disposition-in-home-patients.html   [facility] -- Caring for Someone: www.cdc.gov/coronavirus/2019-ncov/if-you-are-sick/care-for-someone.html   [facility] -- Interim Guidance for Hospital Discharge to Home: www.[facility].[place]./diseases/coronavirus/hcp/hospdischarge.pdf   [facility] clinical trials (COVID-19 research studies): clinicalaffairs.[facility]-clinical-trials    Below are the COVID-19 hotlines at the [place] [facility] ([facility]). Interpreters are available.    For health questions: Call 249-088-1192 or 1-688.471.6932 (7 a.m. to 7 p.m.) For questions about schools and childcare: Call 503-848-2248 or 1-350.527.4963 (7 a.m. to 7 p.m.)    Diagnosis: Cough  Diagnosis ICD: R05

## 2020-12-13 DIAGNOSIS — Z20.822 ENCOUNTER FOR LABORATORY TESTING FOR COVID-19 VIRUS: Primary | ICD-10-CM

## 2020-12-13 PROCEDURE — U0003 INFECTIOUS AGENT DETECTION BY NUCLEIC ACID (DNA OR RNA); SEVERE ACUTE RESPIRATORY SYNDROME CORONAVIRUS 2 (SARS-COV-2) (CORONAVIRUS DISEASE [COVID-19]), AMPLIFIED PROBE TECHNIQUE, MAKING USE OF HIGH THROUGHPUT TECHNOLOGIES AS DESCRIBED BY CMS-2020-01-R: HCPCS | Performed by: FAMILY MEDICINE

## 2020-12-14 LAB
SARS-COV-2 RNA SPEC QL NAA+PROBE: NOT DETECTED
SPECIMEN SOURCE: NORMAL

## 2020-12-20 ENCOUNTER — HEALTH MAINTENANCE LETTER (OUTPATIENT)
Age: 30
End: 2020-12-20

## 2020-12-28 ENCOUNTER — VIRTUAL VISIT (OUTPATIENT)
Dept: FAMILY MEDICINE | Facility: OTHER | Age: 30
End: 2020-12-28

## 2020-12-29 NOTE — PROGRESS NOTES
"Date: 2020 18:41:58  Clinician: Angel Hou  Clinician NPI: [id number]  Patient: Kyle Ayala  Patient : 1990  Patient Address: [address] [place] [address], [place] [address]  Patient Phone: [phone]  Visit Protocol: URI  Patient Summary:  Kyle is a 30 year old ( : 1990 ) male who initiated a [facility] Visit for COVID-19 (Coronavirus) evaluation and screening. When asked the question \"Please sign me up to receive news, health information and promotions. \", Kyle responded \"No\".    Kyle states his symptoms started 1-2 days ago.   His symptoms consist of myalgia, wheezing, a cough, nasal congestion, malaise, a sore throat, and rhinitis.   Symptom details     Nasal secretions: The color of his mucus is clear.    Cough: Kyle coughs every 5-10 minutes and his cough is not more bothersome at night. Phlegm comes into his throat when he coughs. He does not believe his cough is caused by post-nasal drip. The color of the phlegm is clear.     Sore throat: Kyle reports having mild throat pain (1-3 on a 10 point pain scale), does not have exudate on his tonsils, and can swallow liquids. The lymph nodes in his neck are not enlarged. A rash has not appeared on the skin since the sore throat started.     Wheezing: Additional wheezing details as reported by the patient (free text): I just wheeze when I'm sitting        Kyle denies having diarrhea, facial pain or pressure, anosmia, ear pain, headache, fever, enlarged lymph nodes, nausea, chills, teeth pain, ageusia, and vomiting. He also denies having recent facial or sinus surgery in the past 60 days and taking antibiotic medication in the past month. He is not experiencing dyspnea.   Precipitating events  Within the past week, Kyle has not been exposed to someone with strep throat. He has not recently been exposed to someone with influenza. Kyle has not been in close contact with any high risk individuals.   Pertinent COVID-19 " (Coronavirus) information  Kyle does not work or volunteer as healthcare worker or a . 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   Kyle has not had a close contact with a laboratory-confirmed COVID-19 patient within 14 days of symptom onset.    Kyle has been tested for COVID-19.      Date(s) of his COVID-19 test as reported by the patient (free text): 12/06/2020       Result of COVID-19 test as reported by the patient (free text): Negative       Type of test as reported by the patient (free text): Nasal        Pertinent medical history  Kyle has asthma. He uses quick-relief inhaler more than two times per week. He refills his quick-relief inhaler less than two times per year. He wakes up at night with asthma symptoms less than two times per month.   He has not been told by his provider to avoid NSAIDs.   Kyle does not have diabetes. He denies having immunosuppressive conditions (e.g., chemotherapy, HIV, organ transplant, long-term use of steroids or other immunosuppressive medications, splenectomy). He denies having congestive heart failure and severe COPD.   Kyle needs a return to work/school note.   Kyle smokes or uses smokeless tobacco.   Weight: 150 lbs    MEDICATIONS: No current medications, ALLERGIES: Penicillins  Clinician Response:  Dear Kyle,   Your symptoms show that you may have coronavirus (COVID-19). This illness can cause fever, cough and trouble breathing. Many people get a mild case and get better on their own. Some people can get very sick.  What should I do?  We would like to test you for this virus.   1. Please call [phone] to schedule your visit. Explain that you were referred by [facility] to have a COVID-19 test. Be ready to share your [facility] visit ID number.  * If you need to schedule in  "[place] please call [phone] or for [facility] employees please call [phone].  * If you need to schedule in the [place] area please call [phone]. [place] employees call [phone].  The following will serve as your written order for this COVID Test, ordered by me, for the indication of suspected COVID [Z20.828]: The test will be ordered in [employer], our electronic health record, after you are scheduled. It will show as ordered and authorized by Burke Pena MD.  Order: COVID-19 (Coronavirus) PCR for SYMPTOMATIC testing from [facility].   2. When it's time for your COVID test:  Stay at least 6 feet away from others. (If someone will drive you to your test, stay in the backseat, as far away from the  as you can.)   Cover your mouth and nose with a mask, tissue or washcloth.  Go straight to the testing site. Don't make any stops on the way there or back.      3.Starting now: Stay home and away from others (self-isolate) until:   You've had no fever---and no medicine that reduces fever---for one full day (24 hours). And...   Your other symptoms have gotten better. For example, your cough or breathing has improved. And...   At least 10 days have passed since your symptoms started.       During this time, don't leave the house except for testing or medical care.   Stay in your own room, even for meals. Use your own bathroom if you can.   Stay away from others in your home. No hugging, kissing or shaking hands. No visitors.  Don't go to work, school or anywhere else.    Clean \"high touch\" surfaces often (doorknobs, counters, handles, etc.). Use a household cleaning spray or wipes. You'll find a full list of  on the EPA website: www.epa.gov/pesticide-registration/list-n-disinfectants-use-against-sars-cov-2.   Cover your mouth and nose with a mask, tissue or washcloth to avoid spreading germs.  Wash your hands and face often. Use soap and water.  Caregivers in these groups are at risk for severe " illness due to COVID-19:  o People 65 years and older  o People who live in a nursing home or long-term care facility  o People with chronic disease (lung, heart, cancer, diabetes, kidney, liver, immunologic)  o People who have a weakened immune system, including those who:   Are in cancer treatment  Take medicine that weakens the immune system, such as corticosteroids  Had a bone marrow or organ transplant  Have an immune deficiency  Have poorly controlled HIV or AIDS  Are obese (body mass index of 40 or higher)  Smoke regularly   o Caregivers should wear gloves while washing dishes, handling laundry and cleaning bedrooms and bathrooms.  o Use caution when washing and drying laundry: Don't shake dirty laundry, and use the warmest water setting that you can.  o For more tips, go to www.cdc.gov/coronavirus/2019-ncov/downloads/10Things.pdf.    4.Sign up for [employer]. We know it's scary to hear that you might have COVID-19. We want to track your symptoms to make sure you're okay over the next 2 weeks. Please look for an email from [employer]---this is a free, online program that we'll use to keep in touch. To sign up, follow the link in the email. Learn more at [email or web address]  How can I take care of myself?   Get lots of rest. Drink extra fluids (unless a doctor has told you not to).   Take Tylenol (acetaminophen) for fever or pain. If you have liver or kidney problems, ask your family doctor if it's okay to take Tylenol.   Adults can take either:    650 mg (two 325 mg pills) every 4 to 6 hours, or...   1,000 mg (two 500 mg pills) every 8 hours as needed.    Note: Don't take more than 3,000 mg in one day. Acetaminophen is found in many medicines (both prescribed and over-the-counter medicines). Read all labels to be sure you don't take too much.   For children, check the Tylenol bottle for the right dose. The dose is based on the child's age or weight.    If you have other health problems (like  cancer, heart failure, an organ transplant or severe kidney disease): Call your specialty clinic if you don't feel better in the next 2 days.       Know when to call 911. Emergency warning signs include:    Trouble breathing or shortness of breath Pain or pressure in the chest that doesn't go away Feeling confused like you haven't felt before, or not being able to wake up Bluish-colored lips or face.  Where can I get more information?   [facility] -- About COVID-19: [email or web address]   CDC -- What to Do If You're Sick: www.cdc.gov/coronavirus/2019-ncov/about/steps-when-sick.html   CDC -- Ending Home Isolation: www.cdc.gov/coronavirus/2019-ncov/hcp/disposition-in-home-patients.html   [facility] -- Caring for Someone: www.cdc.gov/coronavirus/2019-ncov/if-you-are-sick/care-for-someone.html   [facility] -- Interim Guidance for Hospital Discharge to Home: www.[facility].[place]./diseases/coronavirus/hcp/hospdischarge.pdf   [facility] clinical trials (COVID-19 research studies): clinicalaffairs.[facility]-clinical-trials    Below are the COVID-19 hotlines at the [facility] ([facility]). Interpreters are available.    For health questions: Call 181-886-3785 or 1-133.900.7913 (7 a.m. to 7 p.m.) For questions about schools and childcare: Call 465-254-5645 or 1-538.907.6932 (7 a.m. to 7 p.m.)    Diagnosis: Contact with and (suspected) exposure to other viral communicable diseases  Diagnosis ICD: Z20.828

## 2020-12-30 DIAGNOSIS — Z20.822 ENCOUNTER FOR LABORATORY TESTING FOR COVID-19 VIRUS: Primary | ICD-10-CM

## 2020-12-30 PROCEDURE — U0003 INFECTIOUS AGENT DETECTION BY NUCLEIC ACID (DNA OR RNA); SEVERE ACUTE RESPIRATORY SYNDROME CORONAVIRUS 2 (SARS-COV-2) (CORONAVIRUS DISEASE [COVID-19]), AMPLIFIED PROBE TECHNIQUE, MAKING USE OF HIGH THROUGHPUT TECHNOLOGIES AS DESCRIBED BY CMS-2020-01-R: HCPCS | Performed by: FAMILY MEDICINE

## 2020-12-31 ENCOUNTER — TELEPHONE (OUTPATIENT)
Dept: FAMILY MEDICINE | Facility: CLINIC | Age: 30
End: 2020-12-31

## 2020-12-31 LAB
SARS-COV-2 RNA SPEC QL NAA+PROBE: ABNORMAL
SPECIMEN SOURCE: ABNORMAL

## 2020-12-31 NOTE — TELEPHONE ENCOUNTER
Coronavirus (COVID-19) Notification    Reason for call  Notify of POSITIVE  COVID-19 lab result, assess symptoms,  review [facility] recommendations    Lab Result   Lab test for 2019-nCoV rRt-PCR or SARS-COV-2 PCR  Oropharyngeal AND/OR nasopharyngeal swabs were POSITIVE for 2019-nCoV RNA [OR] SARS-COV-2 RNA (COVID-19) RNA     We have been unable to reach Patient by phone at this time to notify of their Positive COVID-19 result.  Left voicemail message requesting a call back to [phone] [facility] for results.        POSITIVE COVID-19 Letter sent.    Gely Corado LPN

## 2020-12-31 NOTE — TELEPHONE ENCOUNTER
"Coronavirus (COVID-19) Notification    Caller Name (Patient, parent, daughter/son, grandparent, etc)  Kyle, patient    Reason for call  Notify of Positive Coronavirus (COVID-19) lab results, assess symptoms,  review  [employer] recommendations    Lab Result    Lab test:  2019-nCoV rRt-PCR or SARS-CoV-2 PCR    Oropharyngeal AND/OR nasopharyngeal swabs is POSITIVE for 2019-nCoV RNA/SARS-COV-2 PCR (COVID-19 virus)    RN Recommendations/Instructions per [facility] Coronavirus COVID-19 recommendations    Brief introduction script  Introduce self then review script:  \"I am calling on behalf of [employer].  We were notified that your Coronavirus test (COVID-19) for was POSITIVE for the virus.  I have some information to relay to you but first I wanted to mention that the [place] Dept of Health will be contacting you shortly [it's possible MD already called Patient] to talk to you more about how you are feeling and other people you have had contact with who might now also have the virus.  Also,  [employer] is Partnering with the [facility] for Covid-19 research, you may be contacted directly by research staff.\"    Assessment (Inquire about Patient's current symptoms)   Assessment   Current Symptoms at time of phone call: (if no symptoms, document No symptoms] Runny nose, cough   Symptoms onset (if applicable) 12/27/2020     If at time of call, Patients symptoms hare worsened, the Patient should contact 911 or have someone drive them to Emergency Dept promptly:      If Patient calling 911, inform 911 personal that you have tested positive for the Coronavirus (COVID-19).  Place mask on and await 911 to arrive.    If Emergency Dept, If possible, please have another adult drive you to the Emergency Dept but you need to wear mask when in contact with other people.      Monoclonal Antibody Administration    You may be eligible to receive a new treatment with a monoclonal antibody for preventing " "hospitalization in patients at high risk for complications from COVID-19.   This medication is still experimental and available on a limited basis; it is given through an IV and must be given at an infusion center. Please note that not all people who are eligible will receive the medication since it is in limited supply.     Are you interested in being considered for this medication?  No.   Does the patient fit the criteria: Patient declined    If patient qualifies based on above criteria:  \"We will contact you if you are selected to receive the medication in the next 1-2 days.   This is time sensitive and if you are not selected in the next 1-2 days, you will not receive the medication.  If you do not receive a call to schedule, you have not been selected.\"    Review information with Patient    Your result was positive. This means you have COVID-19 (coronavirus).  We have sent you a letter that reviews the information that I'll be reviewing with you now.    How can I protect others?    If you have symptoms: stay home and away from others (self-isolate) until:    You've had no fever--and no medicine that reduces fever--for 1 full day (24 hours). And       Your other symptoms have gotten better. For example, your cough or breathing has improved. And     At least 10 days have passed since your symptoms started. (If you've been told by a doctor that you have a weak immune system, wait 20 days.)     If you don't have symptoms: Stay home and away from others (self-isolate) until at least 10 days have passed since your first positive COVID-19 test. (Date test collected)    During this time:    Stay in your own room, including for meals. Use your own bathroom if you can.    Stay away from others in your home. No hugging, kissing or shaking hands. No visitors.     Don't go to work, school or anywhere else.     Clean  high touch  surfaces often (doorknobs, counters, handles, etc.). Use a household cleaning spray or wipes. " You'll find a full list on the EPA website at www.epa.gov/pesticide-registration/list-n-disinfectants-use-against-sars-cov-2.     Cover your mouth and nose with a mask, tissue or other face covering to avoid spreading germs.    Wash your hands and face often with soap and water.    Caregivers in these groups are at risk for severe illness due to COVID-19:  o People 65 years and older  o People who live in a nursing home or long-term care facility  o People with chronic disease (lung, heart, cancer, diabetes, kidney, liver, immunologic)  o People who have a weakened immune system, including those who:  - Are in cancer treatment  - Take medicine that weakens the immune system, such as corticosteroids  - Had a bone marrow or organ transplant  - Have an immune deficiency  - Have poorly controlled HIV or AIDS  - Are obese (body mass index of 40 or higher)  - Smoke regularly    Caregivers should wear gloves while washing dishes, handling laundry and cleaning bedrooms and bathrooms.    Wash and dry laundry with special caution. Don't shake dirty laundry, and use the warmest water setting you can.    If you have a weakened immune system, ask your doctor about other actions you should take.    For more tips, go to www.cdc.gov/coronavirus/2019-ncov/downloads/10Things.pdf.    You should not go back to work until you meet the guidelines above for ending your home isolation. You don't need to be retested for COVID-19 before going back to work--studies show that you won't spread the virus if it's been at least 10 days since your symptoms started (or 20 days, if you have a weak immune system).    Employers: This document serves as formal notice of your employee's medical guidelines for going back to work. They must meet the above guidelines before going back to work in person.    How can I take care of myself?    1. Get lots of rest. Drink extra fluids (unless a doctor has told you not to).    2. Take Tylenol (acetaminophen) for  fever or pain. If you have liver or kidney problems, ask your family doctor if it's okay to take Tylenol.     Take either:     650 mg (two 325 mg pills) every 4 to 6 hours, or     1,000 mg (two 500 mg pills) every 8 hours as needed.     Note: Don't take more than 3,000 mg in one day. Acetaminophen is found in many medicines (both prescribed and over-the-counter medicines). Read all labels to be sure you don't take too much.    For children, check the Tylenol bottle for the right dose (based on their age or weight).    3. If you have other health problems (like cancer, heart failure, an organ transplant or severe kidney disease): Call your specialty clinic if you don't feel better in the next 2 days.    4. Know when to call 911: Emergency warning signs include:    Trouble breathing or shortness of breath    Pain or pressure in the chest that doesn't go away    Feeling confused like you haven't felt before, or not being able to wake up    Bluish-colored lips or face    5. Sign up for [employer]. We know it's scary to hear that you have COVID-19. We want to track your symptoms to make sure you're okay over the next 2 weeks. Please look for an email from [employer]--this is a free, online program that we'll use to keep in touch. To sign up, follow the link in the email. Learn more at [email or web address].    Where can I get more information?    [facility]: [email or web address]    Coronavirus Basics: www.health.[facility].[place]/diseases/coronavirus/basics.html    What to Do If You're Sick: www.cdc.gov/coronavirus/2019-ncov/about/steps-when-sick.html    Ending Home Isolation: www.cdc.gov/coronavirus/2019-ncov/hcp/disposition-in-home-patients.html     Caring for Someone with COVID-19: www.cdc.gov/coronavirus/2019-ncov/if-you-are-sick/care-for-someone.html     [facility] clinical trials (COVID-19 research studies): clinicalaffairs.[facility]-clinical-trials     A Positive COVID-19  letter will be sent via [employer] or the mail. (Exception, no letters sent to Presurgerical/Preprocedure Patients)    Doris Delatorre LPN

## 2021-02-21 ENCOUNTER — HOSPITAL ENCOUNTER (INPATIENT)
Facility: CLINIC | Age: 31
LOS: 2 days | Discharge: ANOTHER HOSPITAL | End: 2021-02-24
Attending: EMERGENCY MEDICINE | Admitting: INTERNAL MEDICINE
Payer: COMMERCIAL

## 2021-02-21 DIAGNOSIS — E87.6 HYPOKALEMIA: ICD-10-CM

## 2021-02-21 DIAGNOSIS — Z20.822 COVID-19 RULED OUT: ICD-10-CM

## 2021-02-21 DIAGNOSIS — E86.0 DEHYDRATION: ICD-10-CM

## 2021-02-21 DIAGNOSIS — E87.1 HYPONATREMIA: ICD-10-CM

## 2021-02-21 DIAGNOSIS — N17.9 ACUTE RENAL FAILURE, UNSPECIFIED ACUTE RENAL FAILURE TYPE (H): ICD-10-CM

## 2021-02-21 DIAGNOSIS — E83.51 HYPOCALCEMIA: ICD-10-CM

## 2021-02-21 DIAGNOSIS — F10.239 ALCOHOL DEPENDENCE WITH WITHDRAWAL WITH COMPLICATION (H): ICD-10-CM

## 2021-02-21 DIAGNOSIS — F10.939 ALCOHOL WITHDRAWAL SYNDROME WITH COMPLICATION (H): ICD-10-CM

## 2021-02-21 DIAGNOSIS — D72.825 BANDEMIA: ICD-10-CM

## 2021-02-21 LAB
ALBUMIN SERPL-MCNC: 4.6 G/DL (ref 3.4–5)
ALP SERPL-CCNC: 83 U/L (ref 40–150)
ALT SERPL W P-5'-P-CCNC: 38 U/L (ref 0–70)
ANION GAP SERPL CALCULATED.3IONS-SCNC: 47 MMOL/L (ref 3–14)
AST SERPL W P-5'-P-CCNC: 41 U/L (ref 0–45)
BASOPHILS # BLD AUTO: 0.1 10E9/L (ref 0–0.2)
BASOPHILS NFR BLD AUTO: 0.3 %
BILIRUB SERPL-MCNC: 0.8 MG/DL (ref 0.2–1.3)
BUN SERPL-MCNC: 55 MG/DL (ref 7–30)
CA-I SERPL ISE-MCNC: 2.8 MG/DL (ref 4.4–5.2)
CALCIUM SERPL-MCNC: 8.3 MG/DL (ref 8.5–10.1)
CHLORIDE SERPL-SCNC: 52 MMOL/L (ref 94–109)
CO2 SERPL-SCNC: 21 MMOL/L (ref 20–32)
CREAT SERPL-MCNC: 5.2 MG/DL (ref 0.66–1.25)
DIFFERENTIAL METHOD BLD: ABNORMAL
EOSINOPHIL # BLD AUTO: 0 10E9/L (ref 0–0.7)
EOSINOPHIL NFR BLD AUTO: 0.1 %
ERYTHROCYTE [DISTWIDTH] IN BLOOD BY AUTOMATED COUNT: 12.8 % (ref 10–15)
ETHANOL SERPL-MCNC: <0.01 G/DL
GFR SERPL CREATININE-BSD FRML MDRD: 14 ML/MIN/{1.73_M2}
GLUCOSE SERPL-MCNC: 189 MG/DL (ref 70–99)
HCT VFR BLD AUTO: 44.7 % (ref 40–53)
HGB BLD-MCNC: 16.1 G/DL (ref 13.3–17.7)
IMM GRANULOCYTES # BLD: 0.4 10E9/L (ref 0–0.4)
IMM GRANULOCYTES NFR BLD: 1.3 %
LYMPHOCYTES # BLD AUTO: 1.3 10E9/L (ref 0.8–5.3)
LYMPHOCYTES NFR BLD AUTO: 4.3 %
MCH RBC QN AUTO: 31.5 PG (ref 26.5–33)
MCHC RBC AUTO-ENTMCNC: 36 G/DL (ref 31.5–36.5)
MCV RBC AUTO: 88 FL (ref 78–100)
MONOCYTES # BLD AUTO: 2.4 10E9/L (ref 0–1.3)
MONOCYTES NFR BLD AUTO: 7.8 %
NEUTROPHILS # BLD AUTO: 26.3 10E9/L (ref 1.6–8.3)
NEUTROPHILS NFR BLD AUTO: 86.2 %
NRBC # BLD AUTO: 0 10*3/UL
NRBC BLD AUTO-RTO: 0 /100
PLATELET # BLD AUTO: 369 10E9/L (ref 150–450)
POTASSIUM SERPL-SCNC: 2.1 MMOL/L (ref 3.4–5.3)
PROT SERPL-MCNC: 9.2 G/DL (ref 6.8–8.8)
RBC # BLD AUTO: 5.11 10E12/L (ref 4.4–5.9)
SODIUM SERPL-SCNC: 120 MMOL/L (ref 133–144)
WBC # BLD AUTO: 30.5 10E9/L (ref 4–11)

## 2021-02-21 PROCEDURE — 250N000013 HC RX MED GY IP 250 OP 250 PS 637: Performed by: EMERGENCY MEDICINE

## 2021-02-21 PROCEDURE — 99285 EMERGENCY DEPT VISIT HI MDM: CPT | Mod: 25 | Performed by: EMERGENCY MEDICINE

## 2021-02-21 PROCEDURE — 84100 ASSAY OF PHOSPHORUS: CPT | Performed by: PHYSICIAN ASSISTANT

## 2021-02-21 PROCEDURE — 99291 CRITICAL CARE FIRST HOUR: CPT | Mod: 25 | Performed by: EMERGENCY MEDICINE

## 2021-02-21 PROCEDURE — 80053 COMPREHEN METABOLIC PANEL: CPT | Performed by: EMERGENCY MEDICINE

## 2021-02-21 PROCEDURE — 85025 COMPLETE CBC W/AUTO DIFF WBC: CPT | Performed by: EMERGENCY MEDICINE

## 2021-02-21 PROCEDURE — 96366 THER/PROPH/DIAG IV INF ADDON: CPT | Performed by: EMERGENCY MEDICINE

## 2021-02-21 PROCEDURE — 93005 ELECTROCARDIOGRAM TRACING: CPT | Performed by: EMERGENCY MEDICINE

## 2021-02-21 PROCEDURE — 93010 ELECTROCARDIOGRAM REPORT: CPT | Performed by: EMERGENCY MEDICINE

## 2021-02-21 PROCEDURE — 96365 THER/PROPH/DIAG IV INF INIT: CPT | Performed by: EMERGENCY MEDICINE

## 2021-02-21 PROCEDURE — C9803 HOPD COVID-19 SPEC COLLECT: HCPCS | Performed by: EMERGENCY MEDICINE

## 2021-02-21 PROCEDURE — 258N000003 HC RX IP 258 OP 636: Performed by: EMERGENCY MEDICINE

## 2021-02-21 PROCEDURE — 82077 ASSAY SPEC XCP UR&BREATH IA: CPT | Performed by: EMERGENCY MEDICINE

## 2021-02-21 PROCEDURE — 82330 ASSAY OF CALCIUM: CPT | Performed by: EMERGENCY MEDICINE

## 2021-02-21 PROCEDURE — HZ2ZZZZ DETOXIFICATION SERVICES FOR SUBSTANCE ABUSE TREATMENT: ICD-10-PCS | Performed by: INTERNAL MEDICINE

## 2021-02-21 PROCEDURE — 83735 ASSAY OF MAGNESIUM: CPT | Performed by: EMERGENCY MEDICINE

## 2021-02-21 PROCEDURE — 96375 TX/PRO/DX INJ NEW DRUG ADDON: CPT | Performed by: EMERGENCY MEDICINE

## 2021-02-21 PROCEDURE — 250N000011 HC RX IP 250 OP 636: Performed by: EMERGENCY MEDICINE

## 2021-02-21 RX ORDER — MULTIVITAMIN,THERAPEUTIC
1 TABLET ORAL ONCE
Status: COMPLETED | OUTPATIENT
Start: 2021-02-21 | End: 2021-02-21

## 2021-02-21 RX ORDER — FOLIC ACID 1 MG/1
1 TABLET ORAL ONCE
Status: COMPLETED | OUTPATIENT
Start: 2021-02-21 | End: 2021-02-21

## 2021-02-21 RX ORDER — DIAZEPAM 5 MG
5 TABLET ORAL ONCE
Status: COMPLETED | OUTPATIENT
Start: 2021-02-21 | End: 2021-02-21

## 2021-02-21 RX ORDER — POTASSIUM CHLORIDE 7.45 MG/ML
10 INJECTION INTRAVENOUS ONCE
Status: COMPLETED | OUTPATIENT
Start: 2021-02-21 | End: 2021-02-22

## 2021-02-21 RX ORDER — ONDANSETRON 2 MG/ML
4 INJECTION INTRAMUSCULAR; INTRAVENOUS EVERY 30 MIN PRN
Status: DISCONTINUED | OUTPATIENT
Start: 2021-02-21 | End: 2021-02-24 | Stop reason: HOSPADM

## 2021-02-21 RX ORDER — POTASSIUM CHLORIDE 1500 MG/1
40 TABLET, EXTENDED RELEASE ORAL ONCE
Status: COMPLETED | OUTPATIENT
Start: 2021-02-21 | End: 2021-02-22

## 2021-02-21 RX ORDER — DEXTROSE, SODIUM CHLORIDE, SODIUM LACTATE, POTASSIUM CHLORIDE, AND CALCIUM CHLORIDE 5; .6; .31; .03; .02 G/100ML; G/100ML; G/100ML; G/100ML; G/100ML
INJECTION, SOLUTION INTRAVENOUS CONTINUOUS
Status: DISCONTINUED | OUTPATIENT
Start: 2021-02-21 | End: 2021-02-22

## 2021-02-21 RX ORDER — ALCOHOL 70.47 ML/100ML
1 GEL TOPICAL ONCE
Status: DISCONTINUED | OUTPATIENT
Start: 2021-02-21 | End: 2021-02-21 | Stop reason: CLARIF

## 2021-02-21 RX ORDER — LANOLIN ALCOHOL/MO/W.PET/CERES
100 CREAM (GRAM) TOPICAL ONCE
Status: COMPLETED | OUTPATIENT
Start: 2021-02-21 | End: 2021-02-21

## 2021-02-21 RX ADMIN — ONDANSETRON 4 MG: 2 INJECTION INTRAMUSCULAR; INTRAVENOUS at 22:45

## 2021-02-21 RX ADMIN — MULTIVITAMIN TABLET 1 TABLET: TABLET at 22:47

## 2021-02-21 RX ADMIN — DIAZEPAM 5 MG: 5 TABLET ORAL at 22:46

## 2021-02-21 RX ADMIN — FOLIC ACID 1 MG: 1 TABLET ORAL at 23:27

## 2021-02-21 RX ADMIN — THIAMINE HCL TAB 100 MG 100 MG: 100 TAB at 22:47

## 2021-02-21 RX ADMIN — CALCIUM GLUCONATE 2 G: 98 INJECTION, SOLUTION INTRAVENOUS at 23:17

## 2021-02-21 ASSESSMENT — ENCOUNTER SYMPTOMS
TROUBLE SWALLOWING: 0
SHORTNESS OF BREATH: 0
COUGH: 1
NUMBNESS: 0
FATIGUE: 1
HEADACHES: 1
DIAPHORESIS: 0
SORE THROAT: 0
DIARRHEA: 0
APPETITE CHANGE: 1
BACK PAIN: 0
VOMITING: 1
CONSTIPATION: 0
CHILLS: 0
NAUSEA: 1
LIGHT-HEADEDNESS: 0
NECK PAIN: 0
NECK STIFFNESS: 0
WEAKNESS: 0
VOICE CHANGE: 0
FEVER: 0
ABDOMINAL PAIN: 0
ACTIVITY CHANGE: 1
PALPITATIONS: 0

## 2021-02-21 ASSESSMENT — MIFFLIN-ST. JEOR: SCORE: 1701.25

## 2021-02-22 ENCOUNTER — APPOINTMENT (OUTPATIENT)
Dept: NUCLEAR MEDICINE | Facility: CLINIC | Age: 31
End: 2021-02-22
Attending: PHYSICIAN ASSISTANT
Payer: COMMERCIAL

## 2021-02-22 ENCOUNTER — APPOINTMENT (OUTPATIENT)
Dept: GENERAL RADIOLOGY | Facility: CLINIC | Age: 31
End: 2021-02-22
Attending: EMERGENCY MEDICINE
Payer: COMMERCIAL

## 2021-02-22 ENCOUNTER — APPOINTMENT (OUTPATIENT)
Dept: ULTRASOUND IMAGING | Facility: CLINIC | Age: 31
End: 2021-02-22
Attending: PHYSICIAN ASSISTANT
Payer: COMMERCIAL

## 2021-02-22 PROBLEM — N17.9 ACUTE KIDNEY FAILURE (H): Status: ACTIVE | Noted: 2019-03-07

## 2021-02-22 PROBLEM — Z86.16 HISTORY OF COVID-19: Chronic | Status: ACTIVE | Noted: 2021-02-22

## 2021-02-22 PROBLEM — J96.02 ACUTE RESPIRATORY FAILURE WITH HYPOXIA AND HYPERCAPNIA (H): Status: ACTIVE | Noted: 2021-02-22

## 2021-02-22 PROBLEM — F10.939 ALCOHOL WITHDRAWAL (H): Status: ACTIVE | Noted: 2019-03-07

## 2021-02-22 PROBLEM — E83.39 HYPERPHOSPHATEMIA: Status: ACTIVE | Noted: 2021-02-22

## 2021-02-22 PROBLEM — E83.41 HYPERMAGNESEMIA: Status: ACTIVE | Noted: 2021-02-22

## 2021-02-22 PROBLEM — E87.4 METABOLIC ALKALOSIS WITH RESPIRATORY ACIDOSIS: Status: ACTIVE | Noted: 2021-02-22

## 2021-02-22 PROBLEM — J96.01 ACUTE RESPIRATORY FAILURE WITH HYPOXIA AND HYPERCAPNIA (H): Status: ACTIVE | Noted: 2021-02-22

## 2021-02-22 PROBLEM — D72.829 LEUKOCYTOSIS: Status: ACTIVE | Noted: 2021-02-22

## 2021-02-22 PROBLEM — F12.10 CANNABIS USE DISORDER, MILD, ABUSE: Chronic | Status: ACTIVE | Noted: 2021-02-22

## 2021-02-22 PROBLEM — E83.51 HYPOCALCEMIA: Status: ACTIVE | Noted: 2021-02-22

## 2021-02-22 PROBLEM — E87.29 METABOLIC ACIDOSIS, INCREASED ANION GAP: Status: RESOLVED | Noted: 2019-03-07 | Resolved: 2021-02-22

## 2021-02-22 PROBLEM — Z20.822 COVID-19 RULED OUT: Status: ACTIVE | Noted: 2021-02-22

## 2021-02-22 PROBLEM — F12.10 CANNABIS USE DISORDER, MILD, ABUSE: Status: ACTIVE | Noted: 2021-02-22

## 2021-02-22 PROBLEM — D72.825 BANDEMIA: Status: ACTIVE | Noted: 2021-02-22

## 2021-02-22 PROBLEM — Z86.16 HISTORY OF COVID-19: Status: ACTIVE | Noted: 2021-02-22

## 2021-02-22 PROBLEM — R11.2 NAUSEA AND VOMITING: Status: ACTIVE | Noted: 2021-02-22

## 2021-02-22 LAB
ALBUMIN SERPL-MCNC: 3.8 G/DL (ref 3.4–5)
ALBUMIN UR-MCNC: 100 MG/DL
ALP SERPL-CCNC: 70 U/L (ref 40–150)
ALT SERPL W P-5'-P-CCNC: 40 U/L (ref 0–70)
AMORPH CRY #/AREA URNS HPF: ABNORMAL /HPF
AMPHETAMINES UR QL SCN: NEGATIVE
ANION GAP SERPL CALCULATED.3IONS-SCNC: 10 MMOL/L (ref 3–14)
ANION GAP SERPL CALCULATED.3IONS-SCNC: 12 MMOL/L (ref 3–14)
ANION GAP SERPL CALCULATED.3IONS-SCNC: 14 MMOL/L (ref 3–14)
ANION GAP SERPL CALCULATED.3IONS-SCNC: 4 MMOL/L (ref 3–14)
ANION GAP SERPL CALCULATED.3IONS-SCNC: 6 MMOL/L (ref 3–14)
APPEARANCE UR: ABNORMAL
APTT PPP: 31 SEC (ref 22–37)
AST SERPL W P-5'-P-CCNC: 48 U/L (ref 0–45)
BARBITURATES UR QL: NEGATIVE
BASE EXCESS BLDA CALC-SCNC: 23.6 MMOL/L
BASE EXCESS BLDV CALC-SCNC: 23.4 MMOL/L
BASE EXCESS BLDV CALC-SCNC: 23.7 MMOL/L
BENZODIAZ UR QL: POSITIVE
BILIRUB SERPL-MCNC: 0.7 MG/DL (ref 0.2–1.3)
BILIRUB UR QL STRIP: NEGATIVE
BUN SERPL-MCNC: 53 MG/DL (ref 7–30)
BUN SERPL-MCNC: 55 MG/DL (ref 7–30)
BUN SERPL-MCNC: 56 MG/DL (ref 7–30)
BUN SERPL-MCNC: 58 MG/DL (ref 7–30)
BUN SERPL-MCNC: 59 MG/DL (ref 7–30)
CA-I SERPL ISE-MCNC: 3.3 MG/DL (ref 4.4–5.2)
CALCIUM SERPL-MCNC: 7.2 MG/DL (ref 8.5–10.1)
CALCIUM SERPL-MCNC: 7.3 MG/DL (ref 8.5–10.1)
CALCIUM SERPL-MCNC: 7.4 MG/DL (ref 8.5–10.1)
CALCIUM SERPL-MCNC: 7.6 MG/DL (ref 8.5–10.1)
CALCIUM SERPL-MCNC: 8.9 MG/DL (ref 8.5–10.1)
CANNABINOIDS UR QL SCN: POSITIVE
CHLORIDE SERPL-SCNC: 64 MMOL/L (ref 94–109)
CHLORIDE SERPL-SCNC: 71 MMOL/L (ref 94–109)
CHLORIDE SERPL-SCNC: 74 MMOL/L (ref 94–109)
CHLORIDE SERPL-SCNC: 83 MMOL/L (ref 94–109)
CHLORIDE SERPL-SCNC: 86 MMOL/L (ref 94–109)
CHOLEST SERPL-MCNC: 166 MG/DL
CK SERPL-CCNC: 2319 U/L (ref 30–300)
CO2 SERPL-SCNC: 38 MMOL/L (ref 20–32)
CO2 SERPL-SCNC: 41 MMOL/L (ref 20–32)
CO2 SERPL-SCNC: 44 MMOL/L (ref 20–32)
CO2 SERPL-SCNC: 44 MMOL/L (ref 20–32)
CO2 SERPL-SCNC: 45 MMOL/L (ref 20–32)
COCAINE UR QL: NEGATIVE
COLOR UR AUTO: YELLOW
CREAT SERPL-MCNC: 4.64 MG/DL (ref 0.66–1.25)
CREAT SERPL-MCNC: 4.69 MG/DL (ref 0.66–1.25)
CREAT SERPL-MCNC: 4.76 MG/DL (ref 0.66–1.25)
CREAT SERPL-MCNC: 4.82 MG/DL (ref 0.66–1.25)
CREAT SERPL-MCNC: 4.86 MG/DL (ref 0.66–1.25)
CREAT UR-MCNC: 114 MG/DL
D DIMER PPP FEU-MCNC: 1.1 UG/ML FEU (ref 0–0.5)
ERYTHROCYTE [DISTWIDTH] IN BLOOD BY AUTOMATED COUNT: 12.7 % (ref 10–15)
ERYTHROCYTE [DISTWIDTH] IN BLOOD BY AUTOMATED COUNT: 12.7 % (ref 10–15)
FLUAV RNA RESP QL NAA+PROBE: NEGATIVE
FLUBV RNA RESP QL NAA+PROBE: NEGATIVE
FRACT EXCRET NA UR+SERPL-RTO: 0.5 %
GFR SERPL CREATININE-BSD FRML MDRD: 15 ML/MIN/{1.73_M2}
GFR SERPL CREATININE-BSD FRML MDRD: 16 ML/MIN/{1.73_M2}
GFR SERPL CREATININE-BSD FRML MDRD: 16 ML/MIN/{1.73_M2}
GLUCOSE BLDC GLUCOMTR-MCNC: 119 MG/DL (ref 70–99)
GLUCOSE SERPL-MCNC: 108 MG/DL (ref 70–99)
GLUCOSE SERPL-MCNC: 117 MG/DL (ref 70–99)
GLUCOSE SERPL-MCNC: 126 MG/DL (ref 70–99)
GLUCOSE SERPL-MCNC: 128 MG/DL (ref 70–99)
GLUCOSE SERPL-MCNC: 129 MG/DL (ref 70–99)
GLUCOSE UR STRIP-MCNC: NEGATIVE MG/DL
HCO3 BLD-SCNC: 51 MMOL/L (ref 21–28)
HCO3 BLDV-SCNC: 51 MMOL/L (ref 21–28)
HCO3 BLDV-SCNC: 51 MMOL/L (ref 21–28)
HCT VFR BLD AUTO: 37 % (ref 40–53)
HCT VFR BLD AUTO: 38.6 % (ref 40–53)
HDLC SERPL-MCNC: 54 MG/DL
HGB BLD-MCNC: 13.2 G/DL (ref 13.3–17.7)
HGB BLD-MCNC: 14 G/DL (ref 13.3–17.7)
HGB UR QL STRIP: NEGATIVE
KETONES UR STRIP-MCNC: NEGATIVE MG/DL
LABORATORY COMMENT REPORT: NORMAL
LACTATE BLD-SCNC: 1.2 MMOL/L (ref 0.7–2)
LDLC SERPL CALC-MCNC: 83 MG/DL
LEUKOCYTE ESTERASE UR QL STRIP: NEGATIVE
MAGNESIUM SERPL-MCNC: 3.9 MG/DL (ref 1.6–2.3)
MAGNESIUM SERPL-MCNC: 5.9 MG/DL (ref 1.6–2.3)
MCH RBC QN AUTO: 31.4 PG (ref 26.5–33)
MCH RBC QN AUTO: 31.5 PG (ref 26.5–33)
MCHC RBC AUTO-ENTMCNC: 35.7 G/DL (ref 31.5–36.5)
MCHC RBC AUTO-ENTMCNC: 36.3 G/DL (ref 31.5–36.5)
MCV RBC AUTO: 87 FL (ref 78–100)
MCV RBC AUTO: 88 FL (ref 78–100)
MUCOUS THREADS #/AREA URNS LPF: PRESENT /LPF
NITRATE UR QL: NEGATIVE
NONHDLC SERPL-MCNC: 112 MG/DL
O2/TOTAL GAS SETTING VFR VENT: 40 %
O2/TOTAL GAS SETTING VFR VENT: ABNORMAL %
O2/TOTAL GAS SETTING VFR VENT: ABNORMAL %
OPIATES UR QL SCN: NEGATIVE
OSMOLALITY SERPL: 288 MMOL/KG (ref 275–295)
OSMOLALITY UR: 372 MMOL/KG (ref 100–1200)
PCO2 BLD: 62 MM HG (ref 35–45)
PCO2 BLDV: 61 MM HG (ref 40–50)
PCO2 BLDV: 65 MM HG (ref 40–50)
PCP UR QL SCN: NEGATIVE
PH BLD: 7.52 PH (ref 7.35–7.45)
PH BLDV: 7.51 PH (ref 7.32–7.43)
PH BLDV: 7.53 PH (ref 7.32–7.43)
PH UR STRIP: 6 PH (ref 5–7)
PHOSPHATE SERPL-MCNC: 13.3 MG/DL (ref 2.5–4.5)
PHOSPHATE SERPL-MCNC: >18 MG/DL (ref 2.5–4.5)
PLATELET # BLD AUTO: 226 10E9/L (ref 150–450)
PLATELET # BLD AUTO: 256 10E9/L (ref 150–450)
PO2 BLD: 74 MM HG (ref 80–105)
PO2 BLDV: 37 MM HG (ref 25–47)
PO2 BLDV: 48 MM HG (ref 25–47)
POTASSIUM SERPL-SCNC: 2.4 MMOL/L (ref 3.4–5.3)
POTASSIUM SERPL-SCNC: 2.7 MMOL/L (ref 3.4–5.3)
POTASSIUM SERPL-SCNC: 2.9 MMOL/L (ref 3.4–5.3)
POTASSIUM SERPL-SCNC: 2.9 MMOL/L (ref 3.4–5.3)
POTASSIUM SERPL-SCNC: 3 MMOL/L (ref 3.4–5.3)
POTASSIUM SERPL-SCNC: 3.2 MMOL/L (ref 3.4–5.3)
POTASSIUM SERPL-SCNC: 3.2 MMOL/L (ref 3.4–5.3)
PROT SERPL-MCNC: 7.3 G/DL (ref 6.8–8.8)
PTH-INTACT SERPL-MCNC: 548 PG/ML (ref 18–80)
RBC # BLD AUTO: 4.2 10E12/L (ref 4.4–5.9)
RBC # BLD AUTO: 4.44 10E12/L (ref 4.4–5.9)
RBC #/AREA URNS AUTO: 1 /HPF (ref 0–2)
RSV RNA SPEC QL NAA+PROBE: NORMAL
SARS-COV-2 RNA RESP QL NAA+PROBE: NEGATIVE
SODIUM SERPL-SCNC: 123 MMOL/L (ref 133–144)
SODIUM SERPL-SCNC: 127 MMOL/L (ref 133–144)
SODIUM SERPL-SCNC: 128 MMOL/L (ref 133–144)
SODIUM SERPL-SCNC: 128 MMOL/L (ref 133–144)
SODIUM SERPL-SCNC: 130 MMOL/L (ref 133–144)
SODIUM UR-SCNC: 17 MMOL/L
SODIUM UR-SCNC: 18 MMOL/L
SOURCE: ABNORMAL
SP GR UR STRIP: 1.01 (ref 1–1.03)
SPECIMEN SOURCE: NORMAL
SQUAMOUS #/AREA URNS AUTO: <1 /HPF (ref 0–1)
T4 FREE SERPL-MCNC: 1.28 NG/DL (ref 0.76–1.46)
TRIGL SERPL-MCNC: 146 MG/DL
TSH SERPL DL<=0.005 MIU/L-ACNC: 0.2 MU/L (ref 0.4–4)
UFH PPP CHRO-ACNC: <0.1 IU/ML
UROBILINOGEN UR STRIP-MCNC: 0 MG/DL (ref 0–2)
WBC # BLD AUTO: 20 10E9/L (ref 4–11)
WBC # BLD AUTO: 23.4 10E9/L (ref 4–11)
WBC #/AREA URNS AUTO: 4 /HPF (ref 0–5)

## 2021-02-22 PROCEDURE — 99223 1ST HOSP IP/OBS HIGH 75: CPT | Mod: AI | Performed by: INTERNAL MEDICINE

## 2021-02-22 PROCEDURE — 80048 BASIC METABOLIC PNL TOTAL CA: CPT | Performed by: PHYSICIAN ASSISTANT

## 2021-02-22 PROCEDURE — 80307 DRUG TEST PRSMV CHEM ANLYZR: CPT | Performed by: PHYSICIAN ASSISTANT

## 2021-02-22 PROCEDURE — 94150 VITAL CAPACITY TEST: CPT

## 2021-02-22 PROCEDURE — 82330 ASSAY OF CALCIUM: CPT | Performed by: PHYSICIAN ASSISTANT

## 2021-02-22 PROCEDURE — 343N000001 HC RX 343: Performed by: INTERNAL MEDICINE

## 2021-02-22 PROCEDURE — 83930 ASSAY OF BLOOD OSMOLALITY: CPT | Performed by: PHYSICIAN ASSISTANT

## 2021-02-22 PROCEDURE — 82570 ASSAY OF URINE CREATININE: CPT | Performed by: PHYSICIAN ASSISTANT

## 2021-02-22 PROCEDURE — 83970 ASSAY OF PARATHORMONE: CPT | Performed by: PHYSICIAN ASSISTANT

## 2021-02-22 PROCEDURE — 36415 COLL VENOUS BLD VENIPUNCTURE: CPT | Performed by: PHYSICIAN ASSISTANT

## 2021-02-22 PROCEDURE — 250N000013 HC RX MED GY IP 250 OP 250 PS 637: Performed by: EMERGENCY MEDICINE

## 2021-02-22 PROCEDURE — 250N000013 HC RX MED GY IP 250 OP 250 PS 637: Performed by: PHYSICIAN ASSISTANT

## 2021-02-22 PROCEDURE — 85379 FIBRIN DEGRADATION QUANT: CPT | Performed by: PHYSICIAN ASSISTANT

## 2021-02-22 PROCEDURE — 84132 ASSAY OF SERUM POTASSIUM: CPT | Performed by: PHYSICIAN ASSISTANT

## 2021-02-22 PROCEDURE — 258N000003 HC RX IP 258 OP 636: Performed by: PHYSICIAN ASSISTANT

## 2021-02-22 PROCEDURE — 81001 URINALYSIS AUTO W/SCOPE: CPT | Performed by: PHYSICIAN ASSISTANT

## 2021-02-22 PROCEDURE — 250N000011 HC RX IP 250 OP 636: Performed by: PHYSICIAN ASSISTANT

## 2021-02-22 PROCEDURE — 80061 LIPID PANEL: CPT | Performed by: PHYSICIAN ASSISTANT

## 2021-02-22 PROCEDURE — 250N000013 HC RX MED GY IP 250 OP 250 PS 637: Performed by: INTERNAL MEDICINE

## 2021-02-22 PROCEDURE — 999N000157 HC STATISTIC RCP TIME EA 10 MIN

## 2021-02-22 PROCEDURE — 80053 COMPREHEN METABOLIC PANEL: CPT | Performed by: PHYSICIAN ASSISTANT

## 2021-02-22 PROCEDURE — 258N000003 HC RX IP 258 OP 636: Performed by: INTERNAL MEDICINE

## 2021-02-22 PROCEDURE — 83735 ASSAY OF MAGNESIUM: CPT | Performed by: PHYSICIAN ASSISTANT

## 2021-02-22 PROCEDURE — 82803 BLOOD GASES ANY COMBINATION: CPT | Performed by: PHYSICIAN ASSISTANT

## 2021-02-22 PROCEDURE — 96361 HYDRATE IV INFUSION ADD-ON: CPT | Performed by: EMERGENCY MEDICINE

## 2021-02-22 PROCEDURE — 200N000001 HC R&B ICU

## 2021-02-22 PROCEDURE — 85027 COMPLETE CBC AUTOMATED: CPT | Performed by: PHYSICIAN ASSISTANT

## 2021-02-22 PROCEDURE — 71045 X-RAY EXAM CHEST 1 VIEW: CPT

## 2021-02-22 PROCEDURE — 83935 ASSAY OF URINE OSMOLALITY: CPT | Performed by: PHYSICIAN ASSISTANT

## 2021-02-22 PROCEDURE — 87636 SARSCOV2 & INF A&B AMP PRB: CPT | Performed by: EMERGENCY MEDICINE

## 2021-02-22 PROCEDURE — 82306 VITAMIN D 25 HYDROXY: CPT | Performed by: PHYSICIAN ASSISTANT

## 2021-02-22 PROCEDURE — 250N000011 HC RX IP 250 OP 636: Performed by: INTERNAL MEDICINE

## 2021-02-22 PROCEDURE — 76770 US EXAM ABDO BACK WALL COMP: CPT

## 2021-02-22 PROCEDURE — 78580 LUNG PERFUSION IMAGING: CPT

## 2021-02-22 PROCEDURE — 83605 ASSAY OF LACTIC ACID: CPT | Performed by: INTERNAL MEDICINE

## 2021-02-22 PROCEDURE — 999N001017 HC STATISTIC GLUCOSE BY METER IP

## 2021-02-22 PROCEDURE — 250N000009 HC RX 250: Performed by: PHYSICIAN ASSISTANT

## 2021-02-22 PROCEDURE — 36415 COLL VENOUS BLD VENIPUNCTURE: CPT | Performed by: INTERNAL MEDICINE

## 2021-02-22 PROCEDURE — 250N000011 HC RX IP 250 OP 636: Performed by: EMERGENCY MEDICINE

## 2021-02-22 PROCEDURE — 999N000156 HC STATISTIC RCP CONSULT EA 30 MIN

## 2021-02-22 PROCEDURE — 94640 AIRWAY INHALATION TREATMENT: CPT

## 2021-02-22 PROCEDURE — 85520 HEPARIN ASSAY: CPT | Performed by: INTERNAL MEDICINE

## 2021-02-22 PROCEDURE — 82550 ASSAY OF CK (CPK): CPT | Performed by: PHYSICIAN ASSISTANT

## 2021-02-22 PROCEDURE — 84100 ASSAY OF PHOSPHORUS: CPT | Performed by: PHYSICIAN ASSISTANT

## 2021-02-22 PROCEDURE — 84300 ASSAY OF URINE SODIUM: CPT | Performed by: PHYSICIAN ASSISTANT

## 2021-02-22 PROCEDURE — 84132 ASSAY OF SERUM POTASSIUM: CPT | Performed by: INTERNAL MEDICINE

## 2021-02-22 PROCEDURE — 84439 ASSAY OF FREE THYROXINE: CPT | Performed by: PHYSICIAN ASSISTANT

## 2021-02-22 PROCEDURE — 96368 THER/DIAG CONCURRENT INF: CPT | Performed by: EMERGENCY MEDICINE

## 2021-02-22 PROCEDURE — 85730 THROMBOPLASTIN TIME PARTIAL: CPT | Performed by: INTERNAL MEDICINE

## 2021-02-22 PROCEDURE — 36600 WITHDRAWAL OF ARTERIAL BLOOD: CPT

## 2021-02-22 PROCEDURE — 84443 ASSAY THYROID STIM HORMONE: CPT | Performed by: PHYSICIAN ASSISTANT

## 2021-02-22 PROCEDURE — 96375 TX/PRO/DX INJ NEW DRUG ADDON: CPT | Performed by: EMERGENCY MEDICINE

## 2021-02-22 PROCEDURE — 80048 BASIC METABOLIC PNL TOTAL CA: CPT | Performed by: EMERGENCY MEDICINE

## 2021-02-22 PROCEDURE — 999N000126 HC STATISTIC PEAK FLOW MEASUREMENT

## 2021-02-22 PROCEDURE — 85027 COMPLETE CBC AUTOMATED: CPT | Performed by: INTERNAL MEDICINE

## 2021-02-22 PROCEDURE — A9540 TC99M MAA: HCPCS | Performed by: INTERNAL MEDICINE

## 2021-02-22 RX ORDER — DEXTROSE MONOHYDRATE, SODIUM CHLORIDE, AND POTASSIUM CHLORIDE 50; 1.49; 4.5 G/1000ML; G/1000ML; G/1000ML
1000 INJECTION, SOLUTION INTRAVENOUS CONTINUOUS
Status: DISCONTINUED | OUTPATIENT
Start: 2021-02-22 | End: 2021-02-23

## 2021-02-22 RX ORDER — GABAPENTIN 600 MG/1
1200 TABLET ORAL ONCE
Status: COMPLETED | OUTPATIENT
Start: 2021-02-22 | End: 2021-02-22

## 2021-02-22 RX ORDER — DESMOPRESSIN ACETATE 4 UG/ML
2 INJECTION, SOLUTION INTRAVENOUS; SUBCUTANEOUS EVERY 6 HOURS
Status: DISCONTINUED | OUTPATIENT
Start: 2021-02-22 | End: 2021-02-24

## 2021-02-22 RX ORDER — GABAPENTIN 300 MG/1
300 CAPSULE ORAL EVERY 8 HOURS
Status: DISCONTINUED | OUTPATIENT
Start: 2021-02-27 | End: 2021-02-22

## 2021-02-22 RX ORDER — METOPROLOL TARTRATE 1 MG/ML
5 INJECTION, SOLUTION INTRAVENOUS EVERY 6 HOURS PRN
Status: DISCONTINUED | OUTPATIENT
Start: 2021-02-22 | End: 2021-02-24 | Stop reason: HOSPADM

## 2021-02-22 RX ORDER — POTASSIUM CHLORIDE 1500 MG/1
40 TABLET, EXTENDED RELEASE ORAL ONCE
Status: COMPLETED | OUTPATIENT
Start: 2021-02-22 | End: 2021-02-22

## 2021-02-22 RX ORDER — MULTIPLE VITAMINS W/ MINERALS TAB 9MG-400MCG
1 TAB ORAL DAILY
Status: DISCONTINUED | OUTPATIENT
Start: 2021-02-22 | End: 2021-02-24 | Stop reason: HOSPADM

## 2021-02-22 RX ORDER — LANOLIN ALCOHOL/MO/W.PET/CERES
100 CREAM (GRAM) TOPICAL DAILY
Status: DISCONTINUED | OUTPATIENT
Start: 2021-03-01 | End: 2021-02-24 | Stop reason: HOSPADM

## 2021-02-22 RX ORDER — ZOLPIDEM TARTRATE 5 MG/1
5 TABLET ORAL
Status: DISCONTINUED | OUTPATIENT
Start: 2021-02-22 | End: 2021-02-24 | Stop reason: HOSPADM

## 2021-02-22 RX ORDER — AMOXICILLIN 250 MG
1 CAPSULE ORAL 2 TIMES DAILY PRN
Status: DISCONTINUED | OUTPATIENT
Start: 2021-02-22 | End: 2021-02-24 | Stop reason: HOSPADM

## 2021-02-22 RX ORDER — NICOTINE 21 MG/24HR
1 PATCH, TRANSDERMAL 24 HOURS TRANSDERMAL DAILY PRN
Status: DISCONTINUED | OUTPATIENT
Start: 2021-02-22 | End: 2021-02-24 | Stop reason: HOSPADM

## 2021-02-22 RX ORDER — ONDANSETRON 4 MG/1
4 TABLET, ORALLY DISINTEGRATING ORAL EVERY 6 HOURS PRN
Status: DISCONTINUED | OUTPATIENT
Start: 2021-02-22 | End: 2021-02-24 | Stop reason: HOSPADM

## 2021-02-22 RX ORDER — CLONIDINE HYDROCHLORIDE 0.1 MG/1
0.1 TABLET ORAL EVERY 8 HOURS
Status: DISCONTINUED | OUTPATIENT
Start: 2021-02-22 | End: 2021-02-24 | Stop reason: HOSPADM

## 2021-02-22 RX ORDER — DEXTROSE, SODIUM CHLORIDE, SODIUM LACTATE, POTASSIUM CHLORIDE, AND CALCIUM CHLORIDE 5; .6; .31; .03; .02 G/100ML; G/100ML; G/100ML; G/100ML; G/100ML
INJECTION, SOLUTION INTRAVENOUS CONTINUOUS
Status: DISCONTINUED | OUTPATIENT
Start: 2021-02-22 | End: 2021-02-22

## 2021-02-22 RX ORDER — ONDANSETRON 2 MG/ML
4 INJECTION INTRAMUSCULAR; INTRAVENOUS EVERY 6 HOURS PRN
Status: DISCONTINUED | OUTPATIENT
Start: 2021-02-22 | End: 2021-02-24 | Stop reason: HOSPADM

## 2021-02-22 RX ORDER — DEXTROSE MONOHYDRATE 25 G/50ML
25-50 INJECTION, SOLUTION INTRAVENOUS
Status: DISCONTINUED | OUTPATIENT
Start: 2021-02-22 | End: 2021-02-24

## 2021-02-22 RX ORDER — POLYETHYLENE GLYCOL 3350 17 G/17G
17 POWDER, FOR SOLUTION ORAL DAILY PRN
Status: DISCONTINUED | OUTPATIENT
Start: 2021-02-22 | End: 2021-02-24 | Stop reason: HOSPADM

## 2021-02-22 RX ORDER — POTASSIUM CHLORIDE 1500 MG/1
20 TABLET, EXTENDED RELEASE ORAL ONCE
Status: DISCONTINUED | OUTPATIENT
Start: 2021-02-22 | End: 2021-02-22 | Stop reason: CLARIF

## 2021-02-22 RX ORDER — GABAPENTIN 300 MG/1
600 CAPSULE ORAL EVERY 8 HOURS
Status: DISCONTINUED | OUTPATIENT
Start: 2021-02-25 | End: 2021-02-22 | Stop reason: DRUGHIGH

## 2021-02-22 RX ORDER — GABAPENTIN 100 MG/1
100 CAPSULE ORAL EVERY 8 HOURS
Status: DISCONTINUED | OUTPATIENT
Start: 2021-03-01 | End: 2021-02-24 | Stop reason: HOSPADM

## 2021-02-22 RX ORDER — FLUMAZENIL 0.1 MG/ML
0.2 INJECTION, SOLUTION INTRAVENOUS
Status: DISCONTINUED | OUTPATIENT
Start: 2021-02-22 | End: 2021-02-24 | Stop reason: HOSPADM

## 2021-02-22 RX ORDER — FOLIC ACID 1 MG/1
1 TABLET ORAL DAILY
Status: DISCONTINUED | OUTPATIENT
Start: 2021-02-24 | End: 2021-02-24 | Stop reason: HOSPADM

## 2021-02-22 RX ORDER — LORAZEPAM 2 MG/ML
1 INJECTION INTRAMUSCULAR ONCE
Status: COMPLETED | OUTPATIENT
Start: 2021-02-22 | End: 2021-02-22

## 2021-02-22 RX ORDER — LORAZEPAM 2 MG/ML
1-2 INJECTION INTRAMUSCULAR EVERY 4 HOURS PRN
Status: DISCONTINUED | OUTPATIENT
Start: 2021-02-22 | End: 2021-02-24

## 2021-02-22 RX ORDER — LANOLIN ALCOHOL/MO/W.PET/CERES
100 CREAM (GRAM) TOPICAL 3 TIMES DAILY
Status: DISCONTINUED | OUTPATIENT
Start: 2021-02-24 | End: 2021-02-24

## 2021-02-22 RX ORDER — HEPARIN SODIUM 10000 [USP'U]/100ML
0-5000 INJECTION, SOLUTION INTRAVENOUS CONTINUOUS
Status: DISCONTINUED | OUTPATIENT
Start: 2021-02-22 | End: 2021-02-22

## 2021-02-22 RX ORDER — DIAZEPAM 10 MG/2ML
5-10 INJECTION, SOLUTION INTRAMUSCULAR; INTRAVENOUS EVERY 30 MIN PRN
Status: DISCONTINUED | OUTPATIENT
Start: 2021-02-22 | End: 2021-02-24 | Stop reason: HOSPADM

## 2021-02-22 RX ORDER — POTASSIUM CHLORIDE 1500 MG/1
20 TABLET, EXTENDED RELEASE ORAL ONCE
Status: COMPLETED | OUTPATIENT
Start: 2021-02-22 | End: 2021-02-22

## 2021-02-22 RX ORDER — ALBUTEROL SULFATE 0.83 MG/ML
2.5 SOLUTION RESPIRATORY (INHALATION) EVERY 6 HOURS PRN
Status: DISCONTINUED | OUTPATIENT
Start: 2021-02-22 | End: 2021-02-24 | Stop reason: HOSPADM

## 2021-02-22 RX ORDER — DIAZEPAM 5 MG
10 TABLET ORAL EVERY 30 MIN PRN
Status: DISCONTINUED | OUTPATIENT
Start: 2021-02-22 | End: 2021-02-24 | Stop reason: HOSPADM

## 2021-02-22 RX ORDER — AMOXICILLIN 250 MG
2 CAPSULE ORAL 2 TIMES DAILY PRN
Status: DISCONTINUED | OUTPATIENT
Start: 2021-02-22 | End: 2021-02-24 | Stop reason: HOSPADM

## 2021-02-22 RX ORDER — FOLIC ACID 5 MG/ML
1 INJECTION, SOLUTION INTRAMUSCULAR; INTRAVENOUS; SUBCUTANEOUS ONCE
Status: DISCONTINUED | OUTPATIENT
Start: 2021-02-22 | End: 2021-02-22

## 2021-02-22 RX ORDER — HALOPERIDOL 5 MG/ML
2.5-5 INJECTION INTRAMUSCULAR EVERY 4 HOURS PRN
Status: DISCONTINUED | OUTPATIENT
Start: 2021-02-22 | End: 2021-02-24 | Stop reason: HOSPADM

## 2021-02-22 RX ORDER — GABAPENTIN 300 MG/1
900 CAPSULE ORAL EVERY 8 HOURS
Status: DISCONTINUED | OUTPATIENT
Start: 2021-02-22 | End: 2021-02-22 | Stop reason: DRUGHIGH

## 2021-02-22 RX ORDER — ACETAMINOPHEN 325 MG/1
650 TABLET ORAL EVERY 4 HOURS PRN
Status: DISCONTINUED | OUTPATIENT
Start: 2021-02-22 | End: 2021-02-24 | Stop reason: HOSPADM

## 2021-02-22 RX ORDER — NICOTINE POLACRILEX 4 MG
15-30 LOZENGE BUCCAL
Status: DISCONTINUED | OUTPATIENT
Start: 2021-02-22 | End: 2021-02-24

## 2021-02-22 RX ORDER — LORAZEPAM 1 MG/1
1 TABLET ORAL EVERY 4 HOURS PRN
Status: DISCONTINUED | OUTPATIENT
Start: 2021-02-22 | End: 2021-02-24

## 2021-02-22 RX ORDER — FOLIC ACID 5 MG/ML
1 INJECTION, SOLUTION INTRAMUSCULAR; INTRAVENOUS; SUBCUTANEOUS DAILY
Status: COMPLETED | OUTPATIENT
Start: 2021-02-22 | End: 2021-02-23

## 2021-02-22 RX ORDER — GABAPENTIN 300 MG/1
300 CAPSULE ORAL EVERY 8 HOURS
Status: DISCONTINUED | OUTPATIENT
Start: 2021-02-22 | End: 2021-02-24 | Stop reason: HOSPADM

## 2021-02-22 RX ADMIN — SODIUM CHLORIDE, SODIUM LACTATE, POTASSIUM CHLORIDE, CALCIUM CHLORIDE AND DEXTROSE MONOHYDRATE: 5; 600; 310; 30; 20 INJECTION, SOLUTION INTRAVENOUS at 03:02

## 2021-02-22 RX ADMIN — THIAMINE HYDROCHLORIDE 200 MG: 100 INJECTION, SOLUTION INTRAMUSCULAR; INTRAVENOUS at 19:53

## 2021-02-22 RX ADMIN — POTASSIUM CHLORIDE 40 MEQ: 20 TABLET, EXTENDED RELEASE ORAL at 10:10

## 2021-02-22 RX ADMIN — THIAMINE HYDROCHLORIDE 200 MG: 100 INJECTION, SOLUTION INTRAMUSCULAR; INTRAVENOUS at 09:39

## 2021-02-22 RX ADMIN — POTASSIUM CHLORIDE 20 MEQ: 20 TABLET, EXTENDED RELEASE ORAL at 22:59

## 2021-02-22 RX ADMIN — LORAZEPAM 1 MG: 2 INJECTION INTRAMUSCULAR; INTRAVENOUS at 00:28

## 2021-02-22 RX ADMIN — DESMOPRESSIN ACETATE 2 MCG: 4 SOLUTION INTRAVENOUS at 10:54

## 2021-02-22 RX ADMIN — POTASSIUM CHLORIDE, DEXTROSE MONOHYDRATE AND SODIUM CHLORIDE 1000 ML: 150; 5; 450 INJECTION, SOLUTION INTRAVENOUS at 10:13

## 2021-02-22 RX ADMIN — NICOTINE 1 PATCH: 21 PATCH, EXTENDED RELEASE TRANSDERMAL at 03:32

## 2021-02-22 RX ADMIN — HEPARIN SODIUM 1100 UNITS/HR: 10000 INJECTION, SOLUTION INTRAVENOUS at 15:46

## 2021-02-22 RX ADMIN — FOLIC ACID 1 MG: 5 INJECTION, SOLUTION INTRAMUSCULAR; INTRAVENOUS; SUBCUTANEOUS at 09:36

## 2021-02-22 RX ADMIN — DIAZEPAM 10 MG: 5 TABLET ORAL at 03:15

## 2021-02-22 RX ADMIN — GABAPENTIN 300 MG: 300 CAPSULE ORAL at 21:10

## 2021-02-22 RX ADMIN — ZOLPIDEM TARTRATE 5 MG: 5 TABLET ORAL at 21:10

## 2021-02-22 RX ADMIN — POTASSIUM CHLORIDE 40 MEQ: 20 TABLET, EXTENDED RELEASE ORAL at 15:41

## 2021-02-22 RX ADMIN — DESMOPRESSIN ACETATE 2 MCG: 4 SOLUTION INTRAVENOUS at 21:10

## 2021-02-22 RX ADMIN — KIT FOR THE PREPARATION OF TECHNETIUM TC 99M ALBUMIN AGGREGATED 4.3 MILLICURIE: 2.5 INJECTION, POWDER, FOR SOLUTION INTRAVENOUS at 16:25

## 2021-02-22 RX ADMIN — MULTIPLE VITAMINS W/ MINERALS TAB 1 TABLET: TAB at 07:52

## 2021-02-22 RX ADMIN — ACETAMINOPHEN 650 MG: 325 TABLET, FILM COATED ORAL at 21:10

## 2021-02-22 RX ADMIN — POTASSIUM CHLORIDE, DEXTROSE MONOHYDRATE AND SODIUM CHLORIDE 1000 ML: 150; 5; 450 INJECTION, SOLUTION INTRAVENOUS at 18:04

## 2021-02-22 RX ADMIN — SODIUM CHLORIDE, SODIUM LACTATE, POTASSIUM CHLORIDE, CALCIUM CHLORIDE AND DEXTROSE MONOHYDRATE: 5; 600; 310; 30; 20 INJECTION, SOLUTION INTRAVENOUS at 00:28

## 2021-02-22 RX ADMIN — CLONIDINE HYDROCHLORIDE 0.1 MG: 0.1 TABLET ORAL at 18:12

## 2021-02-22 RX ADMIN — ALBUTEROL SULFATE 2.5 MG: 2.5 SOLUTION RESPIRATORY (INHALATION) at 13:43

## 2021-02-22 RX ADMIN — POTASSIUM CHLORIDE 40 MEQ: 20 TABLET, EXTENDED RELEASE ORAL at 03:35

## 2021-02-22 RX ADMIN — DESMOPRESSIN ACETATE 2 MCG: 4 SOLUTION INTRAVENOUS at 15:42

## 2021-02-22 RX ADMIN — POTASSIUM CHLORIDE 10 MEQ: 7.46 INJECTION, SOLUTION INTRAVENOUS at 00:31

## 2021-02-22 RX ADMIN — CLONIDINE HYDROCHLORIDE 0.1 MG: 0.1 TABLET ORAL at 03:14

## 2021-02-22 RX ADMIN — CLONIDINE HYDROCHLORIDE 0.1 MG: 0.1 TABLET ORAL at 10:11

## 2021-02-22 RX ADMIN — POTASSIUM CHLORIDE 40 MEQ: 20 TABLET, EXTENDED RELEASE ORAL at 00:32

## 2021-02-22 RX ADMIN — GABAPENTIN 1200 MG: 600 TABLET, FILM COATED ORAL at 03:14

## 2021-02-22 RX ADMIN — THIAMINE HYDROCHLORIDE 200 MG: 100 INJECTION, SOLUTION INTRAMUSCULAR; INTRAVENOUS at 14:04

## 2021-02-22 ASSESSMENT — ACTIVITIES OF DAILY LIVING (ADL)
DOING_ERRANDS_INDEPENDENTLY_DIFFICULTY: NO
ADLS_ACUITY_SCORE: 11
DIFFICULTY_EATING/SWALLOWING: NO
DIFFICULTY_COMMUNICATING: NO
WEAR_GLASSES_OR_BLIND: NO
WALKING_OR_CLIMBING_STAIRS_DIFFICULTY: NO
TOILETING_ISSUES: NO
DEPENDENT_IADLS:: INDEPENDENT
ADLS_ACUITY_SCORE: 10
DRESSING/BATHING_DIFFICULTY: NO
ADLS_ACUITY_SCORE: 10
FALL_HISTORY_WITHIN_LAST_SIX_MONTHS: NO
CONCENTRATING,_REMEMBERING_OR_MAKING_DECISIONS_DIFFICULTY: YES
ADLS_ACUITY_SCORE: 11
ADLS_ACUITY_SCORE: 10

## 2021-02-22 ASSESSMENT — MIFFLIN-ST. JEOR: SCORE: 1595.25
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
## 2021-02-22 NOTE — ED NOTES
DATE:  2/22/2021   TIME OF RECEIPT FROM LAB:  0138  LAB TEST:  HCO3 and Lactic acid  LAB VALUE: 51 and 4.7  RESULTS GIVEN WITH READ-BACK TO (PROVIDER):  Dr Ross  TIME LAB VALUE REPORTED TO PROVIDER:   1:42 AM

## 2021-02-22 NOTE — PROGRESS NOTES
Unable to perform bedside PFT due to no equipment for this test. Able to to be done as an outpatient. Did give provider other test that may be done as in SVC, NIF, and Peak flow measurements.

## 2021-02-22 NOTE — PROGRESS NOTES
Pt sleeping soundly with O2 NC off. NC reapplied and increased to 5 liters. Will continue to monitor.
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
## 2021-02-22 NOTE — PROGRESS NOTES
[facility]    Medicine Progress Note - Hospitalist Service       Date of Admission:  2/21/2021  Assessment & Plan Kyle Ayala is a 30 year old male admitted on 2/21/2021 for severe SORAIDA, alcohol abuse/withdrawal and electrolytes disturbances.    Acute kidney injury, severe   Admit creatinine 5.20, GFR 14; baseline renal function unclear, no labs in the past year. Does have a prior history of acute kidney injury in the setting of withdrawal, normalized with IV hydration. Occurs in the setting of poor oral intake, nausea, and vomiting suspected due to withdrawal. Likely prerenal azotemia.   Gradually improving with IV fluids. Making urine.  Creatinine 5.20 -> 4.69 -> 4.64  GFR 14 -> 16 -> 16  UA unremarkable other than proteinuria   Renal ultrasound - no significant abnormalities  - FeNa pending  - CK pending  - D5 1/2 NS with 20 mEq KCl @ 150 ml/hr  - BMP q 6 hours    Primary metabolic alkalosis with secondary respiratory acidosis  Admit pH 7.51 / pCO2 65 / bicarb 51. ABG similar (pH 7.52 / pCO2 62 / pO2 74 / bicarb 51). Multiple severe metabolic / electrolyte derangements and new respiratory failure with hypoxia and hypercarbia. Known alcohol use (see below), denies illicit substances other than marijuana. Utox shows cannabinoids and benzos (Utox collected after he received Valium in the hospital). Has had intermittent vomiting which is likely contributing, although unclear why bicarb is so significantly elevated.  - Address electrolyte abnormalities below  - Address respiratory failure below  - Daily VBG's    Acute respiratory failure with hypoxia and hypercarbia   Elevated d-dimer  New 3-5L O2 need on admission. Intermittent chest pain. Chest x-ray unremarkable. VBG as noted above, patient both hypoxic and hypercarbic. Slight rhonchi on exam, no significant wheezing. COVID/influenza PCR negative. Patient likely hypoventilating for unclear reasons, cannot rule out PE (risk factor  includes recent travel).  D-dimer 1.1.  - Continue supplemental O2 to maintain sats > 92%  - VQ scan pending (unable to have CT PE study due to renal function)  - Daily VBG as above  - Bedside modified PFT's pending (PT unable to do full PFT's due to lack of equipment; is able to do slow vital capacity (but not forced), NIF, and peak flow)  - Empiric heparin drip for PE initiated; can be turned off if VQ scan low probability    Hyponatremia, hypovolemic   Admit sodium 120. Is hypovolemic. No fluid boluses given in the emergency department, started on D5 LR @ 150 ml/hr.   Sodium 120 -> 123 -> 127  Urine creatinine 114  Urine sodium 18 -> 17  - Urine osm, serum osm pending  - Change IV fluid to D5 1/2 NS + KCl 20 mEq @ 150 ml/hr  - DDAVP 2 mcg q 6 hours to slow down rapid correction  - Sodium checks q 6 hours    Hyperphosphatemia  Admit phosphate > 18. Unclear cause, but improving with IV fluids.  Phosphate 18 -> 13.3  - Check vitamin D and PTH to rule out vitamin D toxicity and parathyroid diseases / CKD  - Rehydrate and monitor for improvement    Hypocalcemia  Admit Ca = 8.3, ionized 2.8. Possibly related to poor renal function.   - Monitor, no calcium supplementation for now  - Checking vitamin D levels as above    Hypokalemia  Admit K = 2.1. EKG with sinus tachycardia. Occurs in setting of recent emesis, likely causing K losses.   - K replacement protocol  - IV fluids with 20 mEq K @ 150 ml/hr    Hypermagnesemia  Admit Mg = 5.9. Likely due to dehydration.   Improving with IV fluids.  Mg 5.9 -> 3.9  - Continue with IV fluids  - Scheduled BMP    Elevated AG - resolved  Admit anion gap 47. Normalized after IV fluids despite persisting electrolyte abnormalities.  - Resolved    Leukocytosis with bandemia  Admit WBC 30.5, ANC 26.3, absolute monos 2.4. Afebrile. UA without evidence for infection. Chest x-ray clear. No obvious evidence of infection by history or exam.   - Daily CBC with diff  - No antibiotics indicated at  this time, monitor for evidence of infection    Alcohol dependence with withdrawal  Drinks 8-10 shots of 99 proof alcohol daily. Last drink either 2/17 or 2/18, onset of withdrawal symptoms on 2/20. Prior history of withdrawal, no known seizures. Has been through treatment in the past, last time was a few years ago. Expresses desire to quit knowing it is harmful to his health.  - CIWA protocol with gabapentin taper and prn Valium  - IV fluids  - Daily vitamins  - Interested to get into alcohol rehab program - [facility] consult    Nausea and vomiting  Dehydration  Onset the past few days prior to admission, very poor oral intake. Patient hypovolemic with metabolic derangements likely due in part to losses from emesis.  - Rehydrate  - Antiemetics available    Mild intermittent asthma  No controller inhalers, but uses prn albuterol. Mild rhonchi on exam, no significant wheeze. New respiratory failure as noted above, but does not appear to currently have asthma exacerbation.  - Prn albuterol nebs available    Mild major depression  Anxiety  Noted per problem list. Has used prn hydroxyzine for anxiety without significant relief. Not on other antianxiety/antidepressant medications prior to admission.  - Defer to PCP     Cannabis use disorder, mild, abuse  Admits to marijuana use, denies other recreational / illicit drugs. Utox is positive for cannabinoids.   - Encourage cessation    Tobacco abuse  Encourage cessation. Nicotine patch available    COVID-19 ruled out  History of COVID-19  Was COVID PCR positive on 12/30/20. Is within 90 day post COVID window, but was tested again on admission via PCR and was negative.   - COVID PCR negative 2/22  - No isolation or precautions indicated       Diet: Advance Diet as Tolerated: Clear Liquid Diet    DVT Prophylaxis: Pneumatic Compression Devices, heparin drip for empiric PE treatment  Vazquez Catheter: not present  Code Status: Full Code  - discussed with patient          Disposition Plan   Expected discharge: 2+ days, recommended to prior living arrangement once work-up completed, renal function improving, electrolyte abnormalities improving / normalized, respiratory status stable and weaned from supplemental O2.  Entered: Jennifer Patrick PA-C 02/22/2021, 10:40 AM       The patient's care was discussed with the Attending Physician, Dr. Angel Tan, Bedside Nurse and Patient.    Jennifer Patrick PA-C  Hospitalist Service  [facility]  Contact information available via [facility] Paging/Directory    ______________________________________________________________________    Interval History   Feeling better this morning than overnight when he came in.  Had poor urine output, but now feels like he needs to urinate again after receiving IV fluids.  Having cough, wheeze, and shortness of breath recently. Some chest pain / pleuritic pain.  Recent travel to [facility].   Admits to drinking 8-10 shots of 99 proof hard liquor per day. Has withdrawn in the past.   Last drink 4-5 days ago, started getting typical withdrawal symptoms 2 days ago (2/20).  Is interested in quitting, knows his drinking is bad for his health.   Had recent weakness, tremors. Fell off the bed yesterday, no significant injury or loss of consciousness.  Feeling less shaky than when he came to the emergency department.  Poor recent oral intake the past 3-4 days with nausea and vomiting, now feels like he could try eating something.     Data reviewed today: I reviewed all medications, new labs and imaging results over the last 24 hours. I personally reviewed the EKG tracing showing sinus tachycardia and the chest x-ray image(s) showing clear lungs.    Physical Exam   Vital Signs: Temp: 97.8  F (36.6  C) Temp src: Oral BP: (!) 135/94 Pulse: 79   Resp: 18 SpO2: 91 % O2 Device: Nasal cannula Oxygen Delivery: 5 LPM  Weight: 137 lbs 9.07 oz    Constitutional: Alert, oriented, cooperative. No  apparent distress, appears nontoxic, speaking in full sentences.     Eyes: Eyes are clear, pupils are reactive. No scleral icterus.    HEENT: Oropharynx is clear and moist, no lesions. Normocephalic, no evidence of cranial trauma.      Cardiovascular: Regular rhythm, rapid rate, normal S1 and S2. No murmur, rubs, or gallops. Peripheral pulses intact bilaterally. No lower extremity edema.    Respiratory: Inspiratory rhonchi in upper airways. Otherwise lung sounds are clear to auscultation bilaterally without wheezes or crackles.    GI: Soft, non-distended. Non-tender, no rebound or guarding. No hepatosplenomegaly or masses appreciated. Normal bowel sounds.     Musculoskeletal: Without obvious deformity, normal range of motion. Normal muscle bulk and tone. Distal CMS intact.      Skin: Warm and dry, no rashes or ecchymoses. No mottling of skin. Various tattoos present.     Neurologic: Patient moves all extremities.  is symmetric. Gross strength and sensation are equal bilaterally. No tremors or tongue fasciculations.     Genitourinary: Deferred      Data   Recent Labs   Lab 02/22/21  1152 02/22/21  0743 02/22/21  0304 02/22/21  0125 02/21/21  2235   WBC  --  23.4*  --   --  30.5*   HGB  --  14.0  --   --  16.1   MCV  --  87  --   --  88   PLT  --  256  --   --  369   * 127*  --  123* 120*   POTASSIUM 2.9* 2.9* 2.7* 2.4* 2.1*   CHLORIDE 74* 71*  --  64* 52*   CO2 44* 44*  --  45* 21   BUN 56* 53*  --  55* 55*   CR 4.76* 4.64*  --  4.69* 5.20*   ANIONGAP 10 12  --  14 47*   SILVIANO 7.4* 7.6*  --  8.9 8.3*   * 117*  --  128* 189*   ALBUMIN  --  3.8  --   --  4.6   PROTTOTAL  --  7.3  --   --  9.2*   BILITOTAL  --  0.7  --   --  0.8   ALKPHOS  --  70  --   --  83   ALT  --  40  --   --  38   AST  --  48*  --   --  41     Recent Results (from the past 24 hour(s))   XR Chest Port 1 View    Narrative    EXAM: XR CHEST PORT 1 VW  LOCATION: [facility]  DATE/TIME: 2/22/2021 12:38 AM    INDICATION:  Hypoxia  COMPARISON: 02/29/2016      Impression    IMPRESSION:     Heart size is normal. Lungs are clear bilaterally. Mediastinum and visualized bony structures are unremarkable.   US Renal Complete    Narrative    US RENAL COMPLETE 2/22/2021 11:29 AM    HISTORY: Acute kidney injury.    COMPARISON: None.    FINDINGS: The right kidney measures 11.2 x 6.1 x 4.9 cm with a  cortical thickness of 1.9 cm.  The left kidney measures 11.2 x 5.4 x  5.2cm with a cortical thickness of 1.8 cm. The kidneys show no  significant focal finding. The bladder is well distended and shows no  gross abnormality.      Impression    IMPRESSION: No hydronephrosis.    GERALD RITTER MD     Medications     dextrose 5% and 0.45% NaCl + KCl 20 mEq/L 1,000 mL (02/22/21 1013)       cloNIDine  0.1 mg Oral Q8H     desmopressin  2 mcg Intravenous Q6H     [START ON 2/24/2021] folic acid  1 mg Oral Daily     folic acid  1 mg Intravenous Daily     [START ON 3/1/2021] gabapentin  100 mg Oral Q8H     gabapentin  300 mg Oral Q8H     multivitamin w/minerals  1 tablet Oral Daily     nicotine   Transdermal Q8H     thiamine  200 mg Intravenous TID     [START ON 2/24/2021] thiamine  100 mg Oral TID     [START ON 3/1/2021] thiamine  100 mg Oral Daily

## 2021-02-22 NOTE — PLAN OF CARE
Patient's appetite remains poor but taking fluids well.  Remains on O2 via NC @ 3L.  Desats to 82 when on room air.  Has inspiratory wheezes bilaterally.  Continues to c/o chest pressure.  Alert and oriented, pleasant and talkative.  VSS.  Potassium remains low, replaced per protocol and will await lab draw.

## 2021-02-22 NOTE — ED NOTES
"Pt arrives by EMS after complaints of alcohol withdrawal. He states that he has not had a drink in 10 days. He normally drinks \"10 shots per day. He has been in Treatment twice. He states that he has had the tremors, nausea and anxiety as well as intermittent chest pain and some SOB. He states that he is very dry with dry oral mucosa. CIWA 15. Pt is pleasant and cooperative.  "

## 2021-02-22 NOTE — ED NOTES
DATE:  2/21/2021   TIME OF RECEIPT FROM LAB:  2624  LAB TEST:  Ionized calcium   LAB VALUE:  2.8  RESULTS GIVEN WITH READ-BACK TO (PROVIDER):  Jamaal Ross,*  TIME LAB VALUE REPORTED TO PROVIDER:   [id number]

## 2021-02-22 NOTE — PLAN OF CARE
"Patient voided in urinal at bedside times 1.  Also ambulated to BR with SBA and voided.  C/o right upper chest discomfort.  \"Feels like someone is pressing on my chest\".  MD notified.  O2 decreased to 3L via NC.  Sats drop to 82% on room air.  MD notified and aware.  Breath sounds diminshed but clear.  Has loose, nonproductive cough.  CIWA's 6 and 9.  Alert and oriented.  Appetite fair on full liquids for lunch.  Very talkative, pleasant but anxious at times.  Will continue to monitor.  "

## 2021-02-22 NOTE — ED PROVIDER NOTES
History     Chief Complaint   Patient presents with     Alcohol Problem     HPI  Kyle Ayala is a 30 year old male with a history of alcohol abuse and withdrawal in the past presenting for evaluation of alcohol withdrawal symptoms.  Patient reports that he began binge drinking about 9 days ago and had 6 days of heavy alcohol consumption.  3 days ago he decided to stop drinking again  and started to begin to feel sick.  Patient reports that since then he has felt nauseated, poor appetite, vomiting, malaise, body aches, headaches, occasional cough, and some chest pains.  Symptoms all consistent with what he has experienced previously with his alcohol withdrawals.  Patient also reports some degree of depression as he reports the loss of several family members recently who have all  unexpectedly.  Patient denies any suicidal thoughts but does report some difficulty sleeping due to his grief and recent loss.  Patient reports he has been in treatment for his alcohol abuse in the past and would like to get back into treatment.  Patient admits to heavy tobacco smoking and occasional marijuana smoking.  Denies any other drug use.    Allergies:  Allergies   Allergen Reactions     Penicillins      Patient says he was allergic to penicllin as a child but not anymore       Problem List:    Patient Active Problem List    Diagnosis Date Noted     Hypocalcemia 2021     Priority: Medium     Leukocytosis 2021     Priority: Medium     History of COVID-19 2021     Priority: Medium     12-30-20       Cannabis use disorder, mild, abuse 2021     Priority: Medium     Nausea and vomiting 2021     Priority: Medium     Hypermagnesemia 2021     Priority: Medium     COVID-19 ruled out 2021     Priority: Medium     Hyperphosphatemia 2021     Priority: Medium     Alcohol withdrawal (H) 2019     Priority: Medium     Acute kidney injury (H) 2019     Priority: Medium     PAF  (paroxysmal atrial fibrillation) (H) 02/29/2016     Priority: Medium     Non-rheumatic mitral regurgitation 02/29/2016     Priority: Medium     Echo 2/29/2016- Left ventricular systolic function is normal.The visual ejection fraction is estimated at 55-60%. The right ventricular systolic function is normal. There is moderate (2+) mitral regurgitation.The mitral regurgitant jet is eccentrically directed. Severity of the mitral regurgitation may be underestimated due to eccentric jet.       Acute hyperactive alcohol withdrawal delirium (H) 02/28/2016     Priority: Medium     Dehydration 02/27/2016     Priority: Medium     Hypokalemia 02/27/2016     Priority: Medium     Hyponatremia 02/27/2016     Priority: Medium     Alcohol dependence (H) 02/27/2016     Priority: Medium     Mild major depression 01/08/2015     Priority: Medium     Mild intermittent asthma 01/07/2015     Priority: Medium     Health Care Home 12/11/2014     Priority: Medium     State Tier Level:    Status:  Unable to reach  Care Coordinator:  Jacki Fernandez    See Letters for [facility] Care Plan  Date:  March 9, 2016           Anxiety 11/21/2014     Priority: Medium     Newly recognized heart murmur 07/30/2014     Priority: Medium     Diagnosis updated by automated process. Provider to review and confirm.       Loss of weight 05/21/2014     Priority: Medium     Tobacco abuse 05/21/2014     Priority: Medium        Past Medical History:    Past Medical History:   Diagnosis Date     Acute renal failure (ARF) (H) 02/27/2016     Alcohol dependence (H)      Asthma      History of COVID-19 12/30/2019     Tobacco use        Past Surgical History:    Past Surgical History:   Procedure Laterality Date     LACERATION REPAIR Right     Right thigh, chain saw accident       Family History:    Family History   Problem Relation Age of Onset     Alcohol/Drug Mother      Dementia Mother      Alzheimer Disease Mother      Asthma Father      Respiratory Father 54        COPD  "    Alcohol/Drug Father      Dementia Maternal Grandmother      Alzheimer Disease Maternal Grandfather         dementia     C.A.D. Paternal Grandfather      Heart Disease Brother         congenital heart, heart surgery when born     Mental Illness Brother      Alcohol/Drug Brother        Social History:  Marital Status:  Single [1]  Social History     Tobacco Use     Smoking status: Current Every Day Smoker     Packs/day: 1.00     Types: Cigarettes     Smokeless tobacco: Never Used     Tobacco comment: 3/4 of a pack    Substance Use Topics     Alcohol use: Not Currently     Drug use: Yes     Types: Marijuana     Comment: marijuana occ.        Medications:    albuterol (PROAIR HFA/PROVENTIL HFA/VENTOLIN HFA) 108 (90 Base) MCG/ACT inhaler  gabapentin (NEURONTIN) 300 MG capsule  hydrOXYzine (VISTARIL) 50 MG capsule  LORazepam (ATIVAN) 1 MG tablet          Review of Systems   Constitutional: Positive for activity change, appetite change and fatigue. Negative for chills, diaphoresis and fever.   HENT: Negative for congestion, sore throat, trouble swallowing and voice change.    Respiratory: Positive for cough (Occasional). Negative for shortness of breath.    Cardiovascular: Positive for chest pain (Across the upper chest). Negative for palpitations and leg swelling.   Gastrointestinal: Positive for nausea and vomiting. Negative for abdominal pain, constipation and diarrhea.   Genitourinary: Positive for decreased urine volume (With dark-colored urine).   Musculoskeletal: Negative for back pain, neck pain and neck stiffness.   Skin: Negative for rash.   Neurological: Positive for headaches. Negative for weakness, light-headedness and numbness.   All other systems reviewed and are negative.      Physical Exam   BP: 130/78  Pulse: 118  Temp: 97.6  F (36.4  C)  Resp: 20  Height: 177.8 cm (5' 10\")  Weight: 73 kg (160 lb 15 oz)  SpO2: 94 %      Physical Exam  Vitals signs and nursing note reviewed.   Constitutional:       " Appearance: He is not ill-appearing or diaphoretic.      Comments: Disheveled, fatigued appearing but alert and oriented in no distress   HENT:      Head: Atraumatic.      Nose: Nose normal.      Mouth/Throat:      Mouth: Mucous membranes are dry.   Eyes:      Pupils: Pupils are equal, round, and reactive to light.      Comments: Eyes appear somewhat sunken   Neck:      Musculoskeletal: Normal range of motion.   Cardiovascular:      Rate and Rhythm: Regular rhythm. Tachycardia present.      Pulses: Normal pulses.      Heart sounds: Normal heart sounds.   Pulmonary:      Effort: Pulmonary effort is normal.      Breath sounds: Normal breath sounds.   Abdominal:      General: Abdomen is flat.      Palpations: Abdomen is soft.      Tenderness: There is no abdominal tenderness. There is no guarding.   Musculoskeletal:      Right lower leg: No edema.      Left lower leg: No edema.   Skin:     General: Skin is warm and dry.      Capillary Refill: Capillary refill takes less than 2 seconds.   Neurological:      Mental Status: He is oriented to person, place, and time.   Psychiatric:         Attention and Perception: Attention normal.         Mood and Affect: Mood is depressed. Affect is flat.         Speech: Speech normal.         Behavior: Behavior is cooperative.         Thought Content: Thought content does not include suicidal ideation. Thought content does not include suicidal plan.         ED Course        Procedures               EKG Interpretation:      Interpreted by Jamaal Ross MD  Time reviewed: 2236  Symptoms at time of EKG: chest pains earlier   Rhythm: sinus tachycardia  Rate: 121  Axis: Normal  Ectopy: none  Conduction: QRS narrow, borderline short PA interval  ST Segments/ T Waves: Nonspecific ST changes  Q Waves: none  Comparison to prior: New onset tachycardia, QRS morphology likely similar but motion artifact and rate difference limits comparison.    Clinical Impression: Sinus  tachycardia               Results for orders placed or performed during the hospital encounter of 02/21/21 (from the past 24 hour(s))   CBC with platelets differential   Result Value Ref Range    WBC 30.5 (H) 4.0 - 11.0 10e9/L    RBC Count 5.11 4.4 - 5.9 10e12/L    Hemoglobin 16.1 13.3 - 17.7 g/dL    Hematocrit 44.7 40.0 - 53.0 %    MCV 88 78 - 100 fl    MCH 31.5 26.5 - 33.0 pg    MCHC 36.0 31.5 - 36.5 g/dL    RDW 12.8 10.0 - 15.0 %    Platelet Count 369 150 - 450 10e9/L    Diff Method Automated Method     % Neutrophils 86.2 %    % Lymphocytes 4.3 %    % Monocytes 7.8 %    % Eosinophils 0.1 %    % Basophils 0.3 %    % Immature Granulocytes 1.3 %    Nucleated RBCs 0 0 /100    Absolute Neutrophil 26.3 (H) 1.6 - 8.3 10e9/L    Absolute Lymphocytes 1.3 0.8 - 5.3 10e9/L    Absolute Monocytes 2.4 (H) 0.0 - 1.3 10e9/L    Absolute Eosinophils 0.0 0.0 - 0.7 10e9/L    Absolute Basophils 0.1 0.0 - 0.2 10e9/L    Abs Immature Granulocytes 0.4 0 - 0.4 10e9/L    Absolute Nucleated RBC 0.0    Comprehensive metabolic panel   Result Value Ref Range    Sodium 120 (L) 133 - 144 mmol/L    Potassium 2.1 (LL) 3.4 - 5.3 mmol/L    Chloride 52 (L) 94 - 109 mmol/L    Carbon Dioxide 21 20 - 32 mmol/L    Anion Gap 47 (H) 3 - 14 mmol/L    Glucose 189 (H) 70 - 99 mg/dL    Urea Nitrogen 55 (H) 7 - 30 mg/dL    Creatinine 5.20 (H) 0.66 - 1.25 mg/dL    GFR Estimate 14 (L) >60 mL/min/[1.73_m2]    GFR Estimate If Black 16 (L) >60 mL/min/[1.73_m2]    Calcium 8.3 (L) 8.5 - 10.1 mg/dL    Bilirubin Total 0.8 0.2 - 1.3 mg/dL    Albumin 4.6 3.4 - 5.0 g/dL    Protein Total 9.2 (H) 6.8 - 8.8 g/dL    Alkaline Phosphatase 83 40 - 150 U/L    ALT 38 0 - 70 U/L    AST 41 0 - 45 U/L   Alcohol ethyl   Result Value Ref Range    Ethanol g/dL <0.01 <0.01 g/dL   Calcium ionized   Result Value Ref Range    Calcium Ionized 2.8 (LL) 4.4 - 5.2 mg/dL   Magnesium   Result Value Ref Range    Magnesium 5.9 (H) 1.6 - 2.3 mg/dL   Phosphorus   Result Value Ref Range    Phosphorus  >18.0 (H) 2.5 - 4.5 mg/dL   Symptomatic Influenza A/B & SARS-CoV2 (COVID-19) Virus PCR Multiplex    Specimen: Nasopharyngeal   Result Value Ref Range    Flu A/B & SARS-COV-2 PCR Source Nasopharyngeal     SARS-CoV-2 PCR Result NEGATIVE     Influenza A PCR Negative NEG^Negative    Influenza B PCR Negative NEG^Negative    Respiratory Syncytial Virus PCR (Note)     Flu A/B & SARS-CoV-2 PCR Comment (Note)    XR Chest Port 1 View    Narrative    EXAM: XR CHEST PORT 1 VW  LOCATION: [facility]  DATE/TIME: 2/22/2021 12:38 AM    INDICATION: Hypoxia  COMPARISON: 02/29/2016      Impression    IMPRESSION:     Heart size is normal. Lungs are clear bilaterally. Mediastinum and visualized bony structures are unremarkable.   Blood gas venous   Result Value Ref Range    Ph Venous 7.51 (H) 7.32 - 7.43 pH    PCO2 Venous 65 (H) 40 - 50 mm Hg    PO2 Venous 37 25 - 47 mm Hg    Bicarbonate Venous 51 (HH) 21 - 28 mmol/L    Base Excess Venous 23.4 mmol/L    FIO2 21%    Basic metabolic panel   Result Value Ref Range    Sodium 123 (L) 133 - 144 mmol/L    Potassium 2.4 (LL) 3.4 - 5.3 mmol/L    Chloride 64 (L) 94 - 109 mmol/L    Carbon Dioxide 45 (H) 20 - 32 mmol/L    Anion Gap 14 3 - 14 mmol/L    Glucose 128 (H) 70 - 99 mg/dL    Urea Nitrogen 55 (H) 7 - 30 mg/dL    Creatinine 4.69 (H) 0.66 - 1.25 mg/dL    GFR Estimate 16 (L) >60 mL/min/[1.73_m2]    GFR Estimate If Black 18 (L) >60 mL/min/[1.73_m2]    Calcium 8.9 8.5 - 10.1 mg/dL       Medications   ondansetron (ZOFRAN) injection 4 mg (4 mg Intravenous Given 2/21/21 2245)   dextrose 5% in lactated ringers infusion ( Intravenous New Bag 2/22/21 0028)   thiamine (B-1) tablet 100 mg (100 mg Oral Given 2/21/21 2247)   folic acid (FOLVITE) tablet 1 mg (1 mg Oral Given 2/21/21 2327)   diazepam (VALIUM) tablet 5 mg (5 mg Oral Given 2/21/21 2246)   multivitamin, therapeutic (THERA-VIT) tablet 1 tablet (1 tablet Oral Given 2/21/21 2247)   calcium gluconate 2 g in D5W 100 mL intermittent  "infusion (2 g Intravenous New Bag 2/21/21 2317)   potassium chloride ER (KLOR-CON M) CR tablet 40 mEq (40 mEq Oral Given 2/22/21 0032)   potassium chloride 10 mEq in 100 mL sterile water intermittent infusion (premix) (10 mEq Intravenous New Bag 2/22/21 0031)   LORazepam (ATIVAN) injection 1 mg (1 mg Intravenous Given 2/22/21 0028)     Patient Vitals for the past 24 hrs:   BP Temp Temp src Pulse Resp SpO2 Height Weight   02/22/21 0510 -- -- -- -- -- 91 % -- --   02/22/21 0500 125/83 -- -- 84 17 (!) 82 % -- --   02/22/21 0400 (!) 138/94 -- -- 89 16 93 % -- --   02/22/21 0315 -- -- -- -- -- -- -- 62.4 kg (137 lb 9.1 oz)   02/22/21 0300 (!) 143/85 97.8  F (36.6  C) Oral 89 16 91 % -- --   02/22/21 0238 128/79 -- -- 85 18 98 % -- --   02/22/21 0230 128/79 -- -- 82 -- 98 % -- --   02/22/21 0215 121/80 -- -- 102 -- 98 % -- --   02/22/21 0200 (!) 144/86 -- -- 92 -- 99 % -- --   02/22/21 0145 133/78 -- -- 98 -- 98 % -- --   02/22/21 0130 (!) 137/98 -- -- 94 -- 97 % -- --   02/22/21 0115 127/89 -- -- 90 -- 97 % -- --   02/22/21 0100 (!) 141/86 -- -- 90 -- 99 % -- --   02/22/21 0045 (!) 133/91 -- -- 92 -- 97 % -- --   02/22/21 0030 (!) 148/95 -- -- 94 -- 98 % -- --   02/22/21 0015 (!) 144/98 -- -- 91 -- 97 % -- --   02/22/21 0000 113/76 -- -- 83 -- 99 % -- --   02/21/21 2350 131/85 -- -- -- -- 95 % -- --   02/21/21 2345 131/85 -- -- 91 -- (!) 79 % -- --   02/21/21 2330 120/75 -- -- 100 -- 93 % -- --   02/21/21 2315 125/83 -- -- 106 -- 93 % -- --   02/21/21 2300 (!) 142/82 -- -- 95 -- -- -- --   02/21/21 2229 130/78 97.6  F (36.4  C) Oral 118 20 94 % 1.778 m (5' 10\") 73 kg (160 lb 15 oz)         11:33 PM: Advised of potassium of 2.1.  Initial replacement ordered.  Noted significant leukocytosis of unclear origin, possible stress of dehydration.  Afebrile with no distinct infectious symptoms currently.    12:01 AM: Discussed with Denisse, accepts for ICU admission.     2:07 AM; reviewed VBG and repeat BMP requested by " hospitalist.  [facility]  VBG showing a metabolic alkalosis.  Repeat metabolic panel with improved creatinine, potassium, sodium, and normalized anion gap.    2:12 AM; Reviewed case with Dr Robert Bustos. Accepts for admission to the ICU. He will continue to manage the electrolyte derangements and fluids.     Assessments & Plan (with Medical Decision Making)  30-year-old male with history of alcohol abuse and withdrawal in the past presenting for evaluation of alcohol withdrawal symptoms.  Patient was reportedly binge drinking about 9 days ago but has been sober for the past 3 days and has been feeling progressively more ill ever since.  Uncomfortable but nontoxic-appearing in the ED with clinical evidence of dehydration and some mild alcohol withdrawal symptoms.  Treated with IV fluids and standard vitamin replacement therapy.  Labs obtained showed significant electrolyte abnormalities including acute renal failure, hyponatremia, hypochloremia, hypokalemia, hypocalcemia with elevated magnesium and phosphorus.  Initially with a anion gap which was large at 47.  I suspect this is largely due to alcoholic ketoacidosis.  Giving him fluids including D5 LR, repeat metabolic panel showed resolution of his anion gap.  His creatinine also improved along with his electrolytes.  VBG showed a mixed picture with a metabolic alkalosis and respiratory acidosis.  Clinically patient was feeling better with IV fluids as well as some benzodiazepines for his withdrawal.  Admitted to the ICU for further management and care of his acute renal failure and electrolyte abnormalities as well as alcohol withdrawal secondary to recent alcohol binge drinking     I have reviewed the nursing notes.    I have reviewed the findings, diagnosis, plan and need for follow up with the patient.    Critical Care Addendum    My initial assessment, based on my review of nursing observations, review of vital signs, focused  history, physical exam, review of cardiac rhythm monitor and interpretation of labs , established that Kyle Ayala has severe tachycardia and and acute renal failure with multiple severe electrolyte abnormalities and alcohol withdrawl, which requires immediate intervention, and therefore He is critically ill.     After the initial assessment, the care team initiated multiple lab tests, initiated IV fluid administration and initiated medication therapy with diazepam, lorazepam, and electrolyte replacements to provide stabilization care. Due to the critical nature of this patient, I reassessed nursing observations, vital signs, physical exam, mental status, neurologic status and respiratory status multiple times prior to He disposition.     Time also spent performing documentation, reviewing test results, discussion with consultants and coordination of care.     Critical care time (excluding teaching time and procedures): 50 minutes.       New Prescriptions    No medications on file       Final diagnoses:   Dehydration   Acute renal failure, unspecified acute renal failure type (H)   Hypokalemia   Hyponatremia   Hypocalcemia   Bandemia   Alcohol dependence with withdrawal with complication (H) - renal failure       2/21/2021   [facility] EMERGENCY DEPT     Ross, Jamaal Lucero MD  02/22/21 9900

## 2021-02-22 NOTE — PROGRESS NOTES
SVC 2.7 L  Minute Ventilation= RR 10   15L/minute  NIF  -60  PIF 60  Peak flow 250 L/min pre TX                   280 L/min post TX  Tolerated well. Productive cough for large amount of thick tan secretions.

## 2021-02-22 NOTE — PROGRESS NOTES
DATE:  2/22/2021   TIME OF RECEIPT FROM LAB:  1040  LAB TEST:  Bicarb  LAB VALUE:  51  RESULTS GIVEN WITH READ-BACK TO (PROVIDER):  STEPHANIE Chavez  TIME LAB VALUE REPORTED TO PROVIDER:   1044

## 2021-02-22 NOTE — DISCHARGE INSTRUCTIONS
[facility] has a Substance Use Disorder Evaluation Center that offers virtual services through telephone and video appointments.  Patients can be scheduled by calling the [facility] phone number [phone] or by logging onto [employer] and schedule an appointment with a Mental Health or Substance Use Disorder provider.      Mental Health Crisis Numbers:  [place] after hours Crisis Line   [phone]  Text [place] to [phone]              24/7 Crisis Texting line  [facility] [employer] [facility]                         [phone] or [email or web address]        Monday-Friday, 10 am-6 pm, ET.    Crisis Mobile Services:  [facility]   [phone]  [facility]  [phone]  [facility] [employer] [phone]     Counseling/psychotherapy:  [facility]:  [phone]  [facility] [place]:  [phone]  [facility]:  [phone]  [facility]:   [phone]  [facility]:  [phone]

## 2021-02-22 NOTE — PROGRESS NOTES
"[facility] ADMISSION NOTE    Patient admitted to room 1004 at approximately 0250 via cart from   emergency room. Patient was accompanied by nurse.     Verbal SBAR report received from Bentley GUNN prior to patient arrival.     Patient ambulated to bed with one assist. Patient alert and oriented X 2. The patient is not having any pain.  . Admission vital signs: Blood pressure (!) 138/94, pulse 89, temperature 97.8  F (36.6  C), temperature source Oral, resp. rate 16, height 1.778 m (5' 10\"), weight 62.4 kg (137 lb 9.1 oz), SpO2 93 %. Patient was oriented to plan of care, call light, bed controls, tv, telephone, bathroom and visiting hours.     Risk Assessment    The following safety risks were identified during admission: fall. Yellow risk band applied: YES.     Skin Initial Assessment    This writer admitted this patient and completed a full skin assessment and Velasquez score in the Adult PCS flowsheet. Appropriate interventions initiated as needed.     Secondary skin check completed by Margo SWANSON RN.    Velasquez Risk Assessment  Sensory Perception: 4-->no impairment  Moisture: 4-->rarely moist  Activity: 4-->walks frequently  Mobility: 4-->no limitation  Nutrition: 3-->adequate  Friction and Shear: 3-->no apparent problem  Velasquez Score: 22  Mattress: Standard Hospital Mattress (Foam)  Bed Frame: Standard width and length    Education    Patient has a [place] to Observation order: No  Observation education completed and documented: N/A      Maxine Herrera RN    "

## 2021-02-23 ENCOUNTER — APPOINTMENT (OUTPATIENT)
Dept: GENERAL RADIOLOGY | Facility: CLINIC | Age: 31
End: 2021-02-23
Attending: INTERNAL MEDICINE
Payer: COMMERCIAL

## 2021-02-23 LAB
ANION GAP SERPL CALCULATED.3IONS-SCNC: 3 MMOL/L (ref 3–14)
ANION GAP SERPL CALCULATED.3IONS-SCNC: 5 MMOL/L (ref 3–14)
BASE EXCESS BLDV CALC-SCNC: 16.8 MMOL/L
BASOPHILS # BLD AUTO: 0 10E9/L (ref 0–0.2)
BASOPHILS NFR BLD AUTO: 0.1 %
BUN SERPL-MCNC: 60 MG/DL (ref 7–30)
BUN SERPL-MCNC: 65 MG/DL (ref 7–30)
CA-I SERPL ISE-MCNC: 3.8 MG/DL (ref 4.4–5.2)
CALCIUM SERPL-MCNC: 7.3 MG/DL (ref 8.5–10.1)
CALCIUM SERPL-MCNC: 7.8 MG/DL (ref 8.5–10.1)
CHLORIDE SERPL-SCNC: 88 MMOL/L (ref 94–109)
CHLORIDE SERPL-SCNC: 92 MMOL/L (ref 94–109)
CK SERPL-CCNC: 1369 U/L (ref 30–300)
CO2 SERPL-SCNC: 36 MMOL/L (ref 20–32)
CO2 SERPL-SCNC: 40 MMOL/L (ref 20–32)
CREAT SERPL-MCNC: 4.82 MG/DL (ref 0.66–1.25)
CREAT SERPL-MCNC: 4.93 MG/DL (ref 0.66–1.25)
DIFFERENTIAL METHOD BLD: ABNORMAL
EOSINOPHIL # BLD AUTO: 0.1 10E9/L (ref 0–0.7)
EOSINOPHIL NFR BLD AUTO: 0.4 %
ERYTHROCYTE [DISTWIDTH] IN BLOOD BY AUTOMATED COUNT: 12.8 % (ref 10–15)
GFR SERPL CREATININE-BSD FRML MDRD: 15 ML/MIN/{1.73_M2}
GFR SERPL CREATININE-BSD FRML MDRD: 15 ML/MIN/{1.73_M2}
GLUCOSE BLDC GLUCOMTR-MCNC: 128 MG/DL (ref 70–99)
GLUCOSE SERPL-MCNC: 117 MG/DL (ref 70–99)
GLUCOSE SERPL-MCNC: 98 MG/DL (ref 70–99)
HCO3 BLDV-SCNC: 43 MMOL/L (ref 21–28)
HCT VFR BLD AUTO: 31.6 % (ref 40–53)
HGB BLD-MCNC: 10.8 G/DL (ref 13.3–17.7)
IMM GRANULOCYTES # BLD: 0 10E9/L (ref 0–0.4)
IMM GRANULOCYTES NFR BLD: 0.3 %
LYMPHOCYTES # BLD AUTO: 2.3 10E9/L (ref 0.8–5.3)
LYMPHOCYTES NFR BLD AUTO: 19.2 %
MAGNESIUM SERPL-MCNC: 3.1 MG/DL (ref 1.6–2.3)
MCH RBC QN AUTO: 31.4 PG (ref 26.5–33)
MCHC RBC AUTO-ENTMCNC: 34.2 G/DL (ref 31.5–36.5)
MCV RBC AUTO: 92 FL (ref 78–100)
MONOCYTES # BLD AUTO: 0.9 10E9/L (ref 0–1.3)
MONOCYTES NFR BLD AUTO: 7.5 %
NEUTROPHILS # BLD AUTO: 8.5 10E9/L (ref 1.6–8.3)
NEUTROPHILS NFR BLD AUTO: 72.5 %
NRBC # BLD AUTO: 0 10*3/UL
NRBC BLD AUTO-RTO: 0 /100
O2/TOTAL GAS SETTING VFR VENT: ABNORMAL %
PCO2 BLDV: 62 MM HG (ref 40–50)
PH BLDV: 7.45 PH (ref 7.32–7.43)
PHOSPHATE SERPL-MCNC: 4.8 MG/DL (ref 2.5–4.5)
PLATELET # BLD AUTO: 169 10E9/L (ref 150–450)
PO2 BLDV: 50 MM HG (ref 25–47)
POTASSIUM SERPL-SCNC: 3 MMOL/L (ref 3.4–5.3)
POTASSIUM SERPL-SCNC: 3.1 MMOL/L (ref 3.4–5.3)
POTASSIUM SERPL-SCNC: 3.7 MMOL/L (ref 3.4–5.3)
RBC # BLD AUTO: 3.44 10E12/L (ref 4.4–5.9)
SODIUM SERPL-SCNC: 131 MMOL/L (ref 133–144)
SODIUM SERPL-SCNC: 133 MMOL/L (ref 133–144)
WBC # BLD AUTO: 11.8 10E9/L (ref 4–11)

## 2021-02-23 PROCEDURE — 250N000011 HC RX IP 250 OP 636: Performed by: PHYSICIAN ASSISTANT

## 2021-02-23 PROCEDURE — 82550 ASSAY OF CK (CPK): CPT | Performed by: INTERNAL MEDICINE

## 2021-02-23 PROCEDURE — 250N000009 HC RX 250: Performed by: PHYSICIAN ASSISTANT

## 2021-02-23 PROCEDURE — 80048 BASIC METABOLIC PNL TOTAL CA: CPT | Performed by: INTERNAL MEDICINE

## 2021-02-23 PROCEDURE — 36415 COLL VENOUS BLD VENIPUNCTURE: CPT | Performed by: INTERNAL MEDICINE

## 2021-02-23 PROCEDURE — 999N000157 HC STATISTIC RCP TIME EA 10 MIN

## 2021-02-23 PROCEDURE — 258N000003 HC RX IP 258 OP 636: Performed by: PHYSICIAN ASSISTANT

## 2021-02-23 PROCEDURE — 85025 COMPLETE CBC W/AUTO DIFF WBC: CPT | Performed by: PHYSICIAN ASSISTANT

## 2021-02-23 PROCEDURE — 200N000001 HC R&B ICU

## 2021-02-23 PROCEDURE — 36415 COLL VENOUS BLD VENIPUNCTURE: CPT | Performed by: PHYSICIAN ASSISTANT

## 2021-02-23 PROCEDURE — 71045 X-RAY EXAM CHEST 1 VIEW: CPT

## 2021-02-23 PROCEDURE — 84132 ASSAY OF SERUM POTASSIUM: CPT | Performed by: INTERNAL MEDICINE

## 2021-02-23 PROCEDURE — 82330 ASSAY OF CALCIUM: CPT | Performed by: INTERNAL MEDICINE

## 2021-02-23 PROCEDURE — 99232 SBSQ HOSP IP/OBS MODERATE 35: CPT | Performed by: INTERNAL MEDICINE

## 2021-02-23 PROCEDURE — 82803 BLOOD GASES ANY COMBINATION: CPT | Performed by: PHYSICIAN ASSISTANT

## 2021-02-23 PROCEDURE — 250N000013 HC RX MED GY IP 250 OP 250 PS 637: Performed by: EMERGENCY MEDICINE

## 2021-02-23 PROCEDURE — 250N000013 HC RX MED GY IP 250 OP 250 PS 637: Performed by: PHYSICIAN ASSISTANT

## 2021-02-23 PROCEDURE — 83735 ASSAY OF MAGNESIUM: CPT | Performed by: INTERNAL MEDICINE

## 2021-02-23 PROCEDURE — 258N000003 HC RX IP 258 OP 636: Performed by: INTERNAL MEDICINE

## 2021-02-23 PROCEDURE — 84100 ASSAY OF PHOSPHORUS: CPT | Performed by: INTERNAL MEDICINE

## 2021-02-23 PROCEDURE — 250N000013 HC RX MED GY IP 250 OP 250 PS 637: Performed by: INTERNAL MEDICINE

## 2021-02-23 PROCEDURE — 80048 BASIC METABOLIC PNL TOTAL CA: CPT | Performed by: PHYSICIAN ASSISTANT

## 2021-02-23 PROCEDURE — 999N001017 HC STATISTIC GLUCOSE BY METER IP

## 2021-02-23 RX ORDER — POTASSIUM CHLORIDE 750 MG/1
10 TABLET, EXTENDED RELEASE ORAL ONCE
Status: COMPLETED | OUTPATIENT
Start: 2021-02-23 | End: 2021-02-23

## 2021-02-23 RX ORDER — ALBUTEROL SULFATE 90 UG/1
2 AEROSOL, METERED RESPIRATORY (INHALATION)
Status: DISCONTINUED | OUTPATIENT
Start: 2021-02-23 | End: 2021-02-24 | Stop reason: HOSPADM

## 2021-02-23 RX ORDER — POTASSIUM CHLORIDE 1500 MG/1
20 TABLET, EXTENDED RELEASE ORAL ONCE
Status: COMPLETED | OUTPATIENT
Start: 2021-02-23 | End: 2021-02-23

## 2021-02-23 RX ORDER — POTASSIUM CHLORIDE 1500 MG/1
40 TABLET, EXTENDED RELEASE ORAL ONCE
Status: COMPLETED | OUTPATIENT
Start: 2021-02-23 | End: 2021-02-23

## 2021-02-23 RX ADMIN — ALBUTEROL SULFATE 2 PUFF: 90 AEROSOL, METERED RESPIRATORY (INHALATION) at 09:22

## 2021-02-23 RX ADMIN — THIAMINE HYDROCHLORIDE 200 MG: 100 INJECTION, SOLUTION INTRAMUSCULAR; INTRAVENOUS at 19:38

## 2021-02-23 RX ADMIN — ZOLPIDEM TARTRATE 5 MG: 5 TABLET ORAL at 20:05

## 2021-02-23 RX ADMIN — POTASSIUM CHLORIDE, DEXTROSE MONOHYDRATE AND SODIUM CHLORIDE 1000 ML: 150; 5; 450 INJECTION, SOLUTION INTRAVENOUS at 07:37

## 2021-02-23 RX ADMIN — THIAMINE HCL TAB 100 MG 100 MG: 100 TAB at 23:50

## 2021-02-23 RX ADMIN — ACETAMINOPHEN 650 MG: 325 TABLET, FILM COATED ORAL at 08:33

## 2021-02-23 RX ADMIN — POTASSIUM CHLORIDE 20 MEQ: 20 TABLET, EXTENDED RELEASE ORAL at 05:12

## 2021-02-23 RX ADMIN — ALBUTEROL SULFATE 2 PUFF: 90 AEROSOL, METERED RESPIRATORY (INHALATION) at 13:00

## 2021-02-23 RX ADMIN — POTASSIUM CHLORIDE 10 MEQ: 750 TABLET, FILM COATED, EXTENDED RELEASE ORAL at 12:59

## 2021-02-23 RX ADMIN — NICOTINE 1 PATCH: 21 PATCH, EXTENDED RELEASE TRANSDERMAL at 02:39

## 2021-02-23 RX ADMIN — POTASSIUM CHLORIDE 10 MEQ: 750 TABLET, EXTENDED RELEASE ORAL at 20:05

## 2021-02-23 RX ADMIN — ALBUTEROL SULFATE 2 PUFF: 90 AEROSOL, METERED RESPIRATORY (INHALATION) at 19:38

## 2021-02-23 RX ADMIN — GABAPENTIN 300 MG: 300 CAPSULE ORAL at 21:22

## 2021-02-23 RX ADMIN — THIAMINE HYDROCHLORIDE 200 MG: 100 INJECTION, SOLUTION INTRAMUSCULAR; INTRAVENOUS at 07:40

## 2021-02-23 RX ADMIN — POTASSIUM CHLORIDE 40 MEQ: 20 TABLET, EXTENDED RELEASE ORAL at 10:47

## 2021-02-23 RX ADMIN — ALBUTEROL SULFATE 2 PUFF: 90 AEROSOL, METERED RESPIRATORY (INHALATION) at 16:54

## 2021-02-23 RX ADMIN — DESMOPRESSIN ACETATE 2 MCG: 4 SOLUTION INTRAVENOUS at 09:33

## 2021-02-23 RX ADMIN — DOCUSATE SODIUM AND SENNOSIDES 2 TABLET: 8.6; 5 TABLET ORAL at 07:38

## 2021-02-23 RX ADMIN — DESMOPRESSIN ACETATE 2 MCG: 4 SOLUTION INTRAVENOUS at 16:54

## 2021-02-23 RX ADMIN — FOLIC ACID 1 MG: 5 INJECTION, SOLUTION INTRAMUSCULAR; INTRAVENOUS; SUBCUTANEOUS at 07:39

## 2021-02-23 RX ADMIN — CLONIDINE HYDROCHLORIDE 0.1 MG: 0.1 TABLET ORAL at 10:47

## 2021-02-23 RX ADMIN — GABAPENTIN 300 MG: 300 CAPSULE ORAL at 05:12

## 2021-02-23 RX ADMIN — DESMOPRESSIN ACETATE 2 MCG: 4 SOLUTION INTRAVENOUS at 21:22

## 2021-02-23 RX ADMIN — POTASSIUM CHLORIDE, DEXTROSE MONOHYDRATE AND SODIUM CHLORIDE 1000 ML: 150; 5; 450 INJECTION, SOLUTION INTRAVENOUS at 01:12

## 2021-02-23 RX ADMIN — DESMOPRESSIN ACETATE 2 MCG: 4 SOLUTION INTRAVENOUS at 04:29

## 2021-02-23 RX ADMIN — CLONIDINE HYDROCHLORIDE 0.1 MG: 0.1 TABLET ORAL at 02:36

## 2021-02-23 RX ADMIN — THIAMINE HYDROCHLORIDE 200 MG: 100 INJECTION, SOLUTION INTRAMUSCULAR; INTRAVENOUS at 14:49

## 2021-02-23 RX ADMIN — MULTIPLE VITAMINS W/ MINERALS TAB 1 TABLET: TAB at 07:38

## 2021-02-23 RX ADMIN — LORAZEPAM 1 MG: 1 TABLET ORAL at 09:33

## 2021-02-23 RX ADMIN — GABAPENTIN 300 MG: 300 CAPSULE ORAL at 14:49

## 2021-02-23 ASSESSMENT — ACTIVITIES OF DAILY LIVING (ADL)
ADLS_ACUITY_SCORE: 10
ADLS_ACUITY_SCORE: 11
ADLS_ACUITY_SCORE: 10

## 2021-02-23 ASSESSMENT — MIFFLIN-ST. JEOR: SCORE: 1617.25

## 2021-02-23 NOTE — PLAN OF CARE
Mild respiratory insufficiency, treating with supplemental oxygen , Overnight titraed  O2 from 3 to 1 L nasal cannula, O2 saturation at 90-95%.Pt rops O2 sat with ambulation to mid 80's.Adequate inspiratory effort, IS 3000cc. CIWA is 4 and 5. Pt reports chest wall soreness, increased with coughing, PRN Tylenol and warm blankets relieved discomfort.K level is low, despite infusion of D545 NS with 20 K, and PO replacement per protocol.Telemetry reviewed and demonstrated NSR.

## 2021-02-23 NOTE — PROGRESS NOTES
Care Management Follow Up    Length of Stay (days): 1    Expected Discharge Date: 02/23/21     Concerns to be Addressed: substance/tobacco abuse/use  Pt interested in CD resources on discharge. Considering Inpt vs Outpt treatment. Will need a Rule 25 Assessment completed outpatient prior to entering into treatment  Patient plan of care discussed at interdisciplinary rounds: Yes    Anticipated Discharge Disposition: Home, Outpatient Chemical Dependency, Inpatient Chemical Dependency  Disposition Comments: CM to assist Pt with arrangement for Rule 25 assessment. Pt contemplating Outpatient vs Inpatient CD/MI treatment  Anticipated Discharge Services: Chemical Dependency Resources, Mental Health Resources  Anticipated Discharge DME:      Education Provided on the Discharge Plan: yes   Patient/Family in Agreement with the Plan: yes    Referrals Placed by CM/ILEANA: Internal Clinic Care Coordination, Scheduled Follow-up appointments, Community Resources  Private pay costs discussed: Not applicable    Additional Information:  ILEANA met with the Pt and MARY ANN Retana today to discuss Pt's plan for CD/MI treatment options on discharge. Pt stated he and Lainey have spent sometime thinking and he is leaning towards Outpatient.   ILEANA discussed the needed Rule 25 assessment to establish coverage through his insurance. Pt instructed to either call the Behavioral Health # through his insurance or to call the [facility] [phone] when he is closer to his discharge date to set up a virtual assessment. ILEANA provided Pt and Lainey with information for  Behavioral Health.   Pt and Lainey verbalized understand and were very appreciative for the help and support of the staff. Pt stated he felt very comforted by the staff at the hospital.     Plan:    Outpatient Rule 25 assessment on discharge. CM to assist Pt with virtual appointment through [facility] [phone] once Pt is closer to discharge  date     Pt plans to return home with MARY ANN Retana until assessment is completed and treatment plan is determined by CD/MI        ZONIA Colvin

## 2021-02-23 NOTE — PLAN OF CARE
Pt stating overall feeling better today. Able to wean off of supplemental O2. VSS, afebrile. Denies pain. Mildly anxious mid day, admin'd prn ativan with optimal relief of symptoms, per pt. Girlfriend present in room most of day, attentive and encouraging to improving health and abstaining from drinking. Up to shower. Tolerating PO  food well, needs encouragement to drink more fluids. LS coarse early in day, now dimin.  Uses IS, ambulated in halls indpendently. Xenia Nguyen, RN BSN

## 2021-02-23 NOTE — PROGRESS NOTES
Woke patient up to do CIWA score,  Score is 9 at this time.  Patient immediately fell back to sleep.  Valium held at this time.

## 2021-02-23 NOTE — PROGRESS NOTES
[facility]    Hospitalist Progress Note    Date of Service (when I saw the patient): 02/23/2021    Assessment & Plan   Kyle Ayala is a 30 year old male admitted on 2/21/2021 for severe SORAIDA, alcohol abuse/withdrawal and electrolytes disturbances.     Acute kidney injury stage 3  Admit creatinine 5.20, GFR 14; baseline renal function unclear, no labs in the past year. Does have a prior history of acute kidney injury in the setting of withdrawal, normalized with IV hydration. Occurs in the setting of poor oral intake, nausea, and vomiting suspected due to withdrawal. Likely prerenal azotemia.   - Cr was 0.82 on 2/12/2020  - Creatinine 5.20 -> 4.69 -> 4.64->4.76->4.82->4.86->4.82  - GFR 15  - UA unremarkable other than proteinuria   - Renal ultrasound with normal kidney size and cortical thickness, no obstruction  - FeNa 0.5%, urine Na 17  - CK 2319  - Saline lock as appears euvolemic, allow patient to eat and drink  - Renal clearance stable overnight, likely due to ATN  - BMP q 12 hours     Primary metabolic alkalosis   Primary respiratory acidosis  Admit pH 7.51 / pCO2 65 / bicarb 51. ABG similar (pH 7.52 / pCO2 62 / pO2 74 / bicarb 51).  pCO2 higher than what would be expected for acute or chronic metabolic alkalosis.  Multiple severe metabolic or electrolyte derangements and new respiratory failure with hypoxia and hypercarbia. Known alcohol use (see below), denies illicit substances other than marijuana. Utox shows cannabinoids and benzos (Utox collected after he received Valium in the hospital).   - Elevated bicarb likely due to vomiting  - Unclear etiology for respiratory acidosis (diaphragm weakness from electrolyte derangements or asthma?)  - Address electrolyte abnormalities below  - Address respiratory failure below  - Daily VBG's     Acute respiratory failure with hypoxia and hypercarbia   Elevated d-dimer  New 3-5L O2 need on admission. Intermittent chest pain. Chest x-ray  unremarkable. VBG as noted above, patient both hypoxic and hypercarbic. Slight rhonchi on exam, no significant wheezing. COVID/influenza PCR negative. Patient likely hypoventilating for unclear reasons, cannot rule out PE (risk factor includes recent travel).  D-dimer 1.1.  - Continue supplemental O2 to maintain sats > 92%  - Empiric heparin drip for PE initiated on afternoon of 2/22  - VQ scan low risk, discontinued heparin drip evening of 2/22  - Daily VBG as above  - Incentive spirometry  - Bedside modified PFT's pending (PT unable to do full PFT's due to lack of equipment; is able to do slow vital capacity (but not forced), NIF, and peak flow)     Hypovolemic hyponatremia  Admit sodium 120. Is hypovolemic. No fluid boluses given in the emergency department, started on D5 LR @ 150 ml/hr.   Sodium 120 -> 123 -> 127->128->130->128->131  Urine creatinine 114  Urine sodium 18 -> 17  - Urine osm 372, serum osm 288, consistent with ADH activity  - Changed IV fluid to D5 1/2 NS + KCl 20 mEq @ 150 ml/hr on afternoon of 2/22  - DDAVP 2 mcg q 6 hours to slow down rapid correction  - Sodium checks q 6 hours     Hyperphosphatemia  Admit phosphate > 18. Likely due to SORAIDA or CKD, improving with IV fluids, but will monitor daily.  Denies phosphorus containing laxatives.  Phosphate 18 -> 13.3 -> 4.8  - Check vitamin D to rule out vitamin D toxicity and   - , consistent with Vit D deficiency or CKD  - Rehydrate and monitor for improvement  - Daily phos level     Hypocalcemia  Admit Ca = 8.3, ionized 2.8. Possibly related to poor renal function.   - Monitor, no calcium supplementation for now  - Checking vitamin D levels as above     Hypokalemia  Admit K = 2.1. EKG with sinus tachycardia. Occurs in setting of recent emesis, likely causing K losses.   - K replacement protocol     Hypermagnesemia  Admit Mg = 5.9. Due to dehydration vs CKD  Improving with IV fluids.  Mg 5.9 -> 3.9 -> 3.1  - Daily Mg level     Elevated AG -  resolved  Admit anion gap 47. Normalized after IV fluids despite persisting electrolyte abnormalities.  - Resolved     Leukocytosis with bandemia  Admit WBC 30.5, ANC 26.3, absolute monos 2.4. Afebrile. UA without evidence for infection. Chest x-ray clear. No obvious evidence of infection by history or exam.   - Daily CBC with diff  - No antibiotics indicated at this time, monitor for evidence of infection     Alcohol dependence with withdrawal  Drinks 8-10 shots of 99 proof alcohol daily. Last drink either 2/17 or 2/18, onset of withdrawal symptoms on 2/20. Prior history of withdrawal, no known seizures. Has been through treatment in the past, last time was a few years ago. Expresses desire to quit knowing it is harmful to his health.  - CIWA protocol with gabapentin taper and prn Valium  - IV fluids  - Daily vitamins  - Interested to get into alcohol rehab program - Care Transitions consult     Nausea and vomiting  Dehydration  Onset the past few days prior to admission, very poor oral intake. Patient hypovolemic with metabolic derangements likely due in part to losses from emesis.  - Rehydrate  - Antiemetics available     Mild intermittent asthma  No controller inhalers, but uses prn albuterol. Mild rhonchi on exam, no significant wheeze. New respiratory failure as noted above, but does not appear to currently have asthma exacerbation.  - CXR  - Albuterol qid     Mild major depression  Anxiety  Noted per problem list. Has used prn hydroxyzine for anxiety without significant relief. Not on other antianxiety/antidepressant medications prior to admission.  - Defer to PCP      Cannabis use disorder, mild, abuse  Admits to marijuana use, denies other recreational / illicit drugs. Utox is positive for cannabinoids.   - Encourage cessation     Tobacco abuse  Encourage cessation. Nicotine patch available     COVID-19 ruled out  History of COVID-19  Was COVID PCR positive on 12/30/20. Is within 90 day post COVID window,  but was tested again on admission via PCR and was negative.   - COVID PCR negative 2/22  - No isolation or precautions indicated    Diet: Advance Diet as Tolerated: Clear Liquid Diet    DVT Prophylaxis: Pneumatic Compression Devices, heparin drip for empiric PE treatment  Vazquez Catheter: not present  Code Status: Full Code  - discussed with patient    Disposition: Expected discharge in 3+ days once renal function improves.    Angel Tan    Interval History   The patient is sitting in bed.  His appetite is good and he is voiding well.    -Data reviewed today: I reviewed all new labs and imaging results over the last 24 hours. I personally reviewed no images or EKG's today.    Physical Exam   Temp: 97.9  F (36.6  C) Temp src: Oral BP: 137/89 Pulse: 85   Resp: 20 SpO2: 95 % O2 Device: Nasal cannula Oxygen Delivery: 1 LPM  Vitals:    02/21/21 2229 02/22/21 0315 02/23/21 0500   Weight: 73 kg (160 lb 15 oz) 62.4 kg (137 lb 9.1 oz) 64.6 kg (142 lb 6.7 oz)     Vital Signs with Ranges  Temp:  [97.9  F (36.6  C)-98.1  F (36.7  C)] 97.9  F (36.6  C)  Pulse:  [] 85  Resp:  [9-34] 20  BP: ()/() 137/89  SpO2:  [88 %-100 %] 95 %  I/O last 3 completed shifts:  In: 4780 [P.O.:1840; I.V.:2940]  Out: 300 [Urine:300]    Gen: Well nourished, well developed, alert and oriented x 3, no acute distressed  HEENT: Atraumatic, normocephalic; sclera non-injected, anicterric; oral mucosa moist, no lesion, no exudate  Lungs: Clear to ausculation, no wheezes, no rhonchi, no rales  Heart: Regular rate, regular rhythm, no gallops, no rubs, no murmurs  GI: Bowel sound normal, no hepatosplenomegaly, no masses, non-tender, non-distended, no guarding, no rebound tenderness  Lymph: No lymphadenopathy, no edema  Skin: No rashes, no chronic venous stasis     Medications       albuterol  2 puff Inhalation 4x daily     cloNIDine  0.1 mg Oral Q8H     desmopressin  2 mcg Intravenous Q6H     [START ON 2/24/2021] folic acid  1 mg Oral  Daily     [START ON 3/1/2021] gabapentin  100 mg Oral Q8H     gabapentin  300 mg Oral Q8H     multivitamin w/minerals  1 tablet Oral Daily     nicotine   Transdermal Q8H     potassium chloride  10 mEq Oral Once     potassium chloride  40 mEq Oral Once     thiamine  200 mg Intravenous TID     [START ON 2/24/2021] thiamine  100 mg Oral TID     [START ON 3/1/2021] thiamine  100 mg Oral Daily       Data   Recent Labs   Lab 02/23/21  0918 02/23/21  0417 02/22/21 2208 02/22/21  1821 02/22/21  1546 02/22/21  0743 02/22/21  0743 02/21/21 2235 02/21/21 2235   WBC  --  11.8*  --   --  20.0*  --  23.4*  --  30.5*   HGB  --  10.8*  --   --  13.2*  --  14.0  --  16.1   MCV  --  92  --   --  88  --  87  --  88   PLT  --  169  --   --  226  --  256  --  369   NA  --  131* 128* 130*  --    < > 127*   < > 120*   POTASSIUM 3.0* 3.1* 3.2* 3.2*  --    < > 2.9*   < > 2.1*   CHLORIDE  --  88* 86* 83*  --    < > 71*   < > 52*   CO2  --  40* 38* 41*  --    < > 44*   < > 21   BUN  --  60* 58* 59*  --    < > 53*   < > 55*   CR  --  4.82* 4.86* 4.82*  --    < > 4.64*   < > 5.20*   ANIONGAP  --  3 4 6  --    < > 12   < > 47*   SILVIANO  --  7.3* 7.2* 7.3*  --    < > 7.6*   < > 8.3*   GLC  --  117* 129* 126*  --    < > 117*   < > 189*   ALBUMIN  --   --   --   --   --   --  3.8  --  4.6   PROTTOTAL  --   --   --   --   --   --  7.3  --  9.2*   BILITOTAL  --   --   --   --   --   --  0.7  --  0.8   ALKPHOS  --   --   --   --   --   --  70  --  83   ALT  --   --   --   --   --   --  40  --  38   AST  --   --   --   --   --   --  48*  --  41    < > = values in this interval not displayed.       Recent Results (from the past 24 hour(s))   US Renal Complete    Narrative    US RENAL COMPLETE 2/22/2021 11:29 AM    HISTORY: Acute kidney injury.    COMPARISON: None.    FINDINGS: The right kidney measures 11.2 x 6.1 x 4.9 cm with a  cortical thickness of 1.9 cm.  The left kidney measures 11.2 x 5.4 x  5.2cm with a cortical thickness of 1.8 cm. The kidneys  show no  significant focal finding. The bladder is well distended and shows no  gross abnormality.      Impression    IMPRESSION: No hydronephrosis.    GERALD RITTER MD   NM Lung Scan Perfusion Particulate    Narrative    Examination:  NM LUNG SCAN PERFUSION PARTICULATE   Date:  2/22/2021 4:52 PM   Indication:    Chest pain, nonspecific; Respiratory failure; PE  suspected, low/intermediate prob, positive D-dimer   Previous Study: Chest radiograph 2/22/2021 at 00:40 hours  Technique: The patient received 4.30 mCi of Tc-99m labeled MAA  intravenously. A standard eight view lung perfusion scan was obtained.    Findings:  No perfusion abnormalities identified.      Impression    Impression:   Low probability of PE.    LESTER J FAHRNER, MD

## 2021-02-24 ENCOUNTER — HOSPITAL ENCOUNTER (INPATIENT)
Facility: CLINIC | Age: 31
LOS: 2 days | Discharge: HOME | End: 2021-02-26
Attending: INTERNAL MEDICINE | Admitting: HOSPITALIST
Payer: COMMERCIAL

## 2021-02-24 VITALS
HEIGHT: 70 IN | OXYGEN SATURATION: 99 % | WEIGHT: 146.61 LBS | HEART RATE: 76 BPM | BODY MASS INDEX: 20.99 KG/M2 | TEMPERATURE: 98.1 F | DIASTOLIC BLOOD PRESSURE: 97 MMHG | RESPIRATION RATE: 18 BRPM | SYSTOLIC BLOOD PRESSURE: 149 MMHG

## 2021-02-24 PROBLEM — N17.9 ACUTE RENAL FAILURE (ARF) (H): Status: ACTIVE | Noted: 2021-02-24

## 2021-02-24 LAB
ALBUMIN UR-MCNC: 30 MG/DL
ANION GAP SERPL CALCULATED.3IONS-SCNC: 5 MMOL/L (ref 3–14)
ANION GAP SERPL CALCULATED.3IONS-SCNC: 5 MMOL/L (ref 3–14)
APPEARANCE UR: CLEAR
BASOPHILS # BLD AUTO: 0 10E9/L (ref 0–0.2)
BASOPHILS NFR BLD AUTO: 0.1 %
BILIRUB UR QL STRIP: NEGATIVE
BUN SERPL-MCNC: 73 MG/DL (ref 7–30)
BUN SERPL-MCNC: 77 MG/DL (ref 7–30)
CA-I BLD-MCNC: 4 MG/DL (ref 4.4–5.2)
CALCIUM SERPL-MCNC: 7.9 MG/DL (ref 8.5–10.1)
CALCIUM SERPL-MCNC: 8.1 MG/DL (ref 8.5–10.1)
CHLORIDE SERPL-SCNC: 94 MMOL/L (ref 94–109)
CHLORIDE SERPL-SCNC: 98 MMOL/L (ref 94–109)
CO2 SERPL-SCNC: 33 MMOL/L (ref 20–32)
CO2 SERPL-SCNC: 33 MMOL/L (ref 20–32)
COLOR UR AUTO: YELLOW
CREAT SERPL-MCNC: 4.88 MG/DL (ref 0.66–1.25)
CREAT SERPL-MCNC: 5 MG/DL (ref 0.66–1.25)
DEPRECATED CALCIDIOL+CALCIFEROL SERPL-MC: 15 UG/L (ref 20–75)
DIFFERENTIAL METHOD BLD: ABNORMAL
EOSINOPHIL # BLD AUTO: 0.2 10E9/L (ref 0–0.7)
EOSINOPHIL NFR BLD AUTO: 1.5 %
ERYTHROCYTE [DISTWIDTH] IN BLOOD BY AUTOMATED COUNT: 12.3 % (ref 10–15)
GFR SERPL CREATININE-BSD FRML MDRD: 14 ML/MIN/{1.73_M2}
GFR SERPL CREATININE-BSD FRML MDRD: 15 ML/MIN/{1.73_M2}
GLUCOSE SERPL-MCNC: 108 MG/DL (ref 70–99)
GLUCOSE SERPL-MCNC: 89 MG/DL (ref 70–99)
GLUCOSE UR STRIP-MCNC: 50 MG/DL
HCT VFR BLD AUTO: 32.2 % (ref 40–53)
HGB BLD-MCNC: 11 G/DL (ref 13.3–17.7)
HGB UR QL STRIP: NEGATIVE
IMM GRANULOCYTES # BLD: 0.1 10E9/L (ref 0–0.4)
IMM GRANULOCYTES NFR BLD: 0.5 %
KETONES UR STRIP-MCNC: NEGATIVE MG/DL
LACTATE BLD-SCNC: 0.5 MMOL/L (ref 0.7–2)
LEUKOCYTE ESTERASE UR QL STRIP: ABNORMAL
LYMPHOCYTES # BLD AUTO: 2.3 10E9/L (ref 0.8–5.3)
LYMPHOCYTES NFR BLD AUTO: 14.9 %
MAGNESIUM SERPL-MCNC: 2.9 MG/DL (ref 1.6–2.3)
MCH RBC QN AUTO: 31.6 PG (ref 26.5–33)
MCHC RBC AUTO-ENTMCNC: 34.2 G/DL (ref 31.5–36.5)
MCV RBC AUTO: 93 FL (ref 78–100)
MONOCYTES # BLD AUTO: 1 10E9/L (ref 0–1.3)
MONOCYTES NFR BLD AUTO: 6.8 %
NEUTROPHILS # BLD AUTO: 11.6 10E9/L (ref 1.6–8.3)
NEUTROPHILS NFR BLD AUTO: 76.2 %
NITRATE UR QL: NEGATIVE
NRBC # BLD AUTO: 0 10*3/UL
NRBC BLD AUTO-RTO: 0 /100
PH UR STRIP: 7 PH (ref 5–7)
PHOSPHATE SERPL-MCNC: 3.5 MG/DL (ref 2.5–4.5)
PLATELET # BLD AUTO: 158 10E9/L (ref 150–450)
POTASSIUM SERPL-SCNC: 3.9 MMOL/L (ref 3.4–5.3)
POTASSIUM SERPL-SCNC: 4.1 MMOL/L (ref 3.4–5.3)
PROT UR-MCNC: 0.68 G/L
PROT/CREAT 24H UR: 0.33 G/G CR (ref 0–0.2)
RBC # BLD AUTO: 3.48 10E12/L (ref 4.4–5.9)
RBC #/AREA URNS AUTO: 3 /HPF (ref 0–2)
SODIUM SERPL-SCNC: 132 MMOL/L (ref 133–144)
SODIUM SERPL-SCNC: 136 MMOL/L (ref 133–144)
SODIUM UR-SCNC: 25 MMOL/L
SOURCE: ABNORMAL
SP GR UR STRIP: 1.02 (ref 1–1.03)
SQUAMOUS #/AREA URNS AUTO: <1 /HPF (ref 0–1)
UROBILINOGEN UR STRIP-MCNC: 0 MG/DL (ref 0–2)
WBC # BLD AUTO: 15.2 10E9/L (ref 4–11)
WBC #/AREA URNS AUTO: 16 /HPF (ref 0–5)

## 2021-02-24 PROCEDURE — 120N000001 HC R&B MED SURG/OB

## 2021-02-24 PROCEDURE — 36415 COLL VENOUS BLD VENIPUNCTURE: CPT | Performed by: PHYSICIAN ASSISTANT

## 2021-02-24 PROCEDURE — 99239 HOSP IP/OBS DSCHRG MGMT >30: CPT | Performed by: INTERNAL MEDICINE

## 2021-02-24 PROCEDURE — 250N000013 HC RX MED GY IP 250 OP 250 PS 637: Performed by: EMERGENCY MEDICINE

## 2021-02-24 PROCEDURE — 81001 URINALYSIS AUTO W/SCOPE: CPT | Performed by: INTERNAL MEDICINE

## 2021-02-24 PROCEDURE — 84300 ASSAY OF URINE SODIUM: CPT | Performed by: INTERNAL MEDICINE

## 2021-02-24 PROCEDURE — 250N000011 HC RX IP 250 OP 636: Performed by: PHYSICIAN ASSISTANT

## 2021-02-24 PROCEDURE — 258N000003 HC RX IP 258 OP 636: Performed by: INTERNAL MEDICINE

## 2021-02-24 PROCEDURE — 250N000013 HC RX MED GY IP 250 OP 250 PS 637: Performed by: PHYSICIAN ASSISTANT

## 2021-02-24 PROCEDURE — 250N000013 HC RX MED GY IP 250 OP 250 PS 637: Performed by: INTERNAL MEDICINE

## 2021-02-24 PROCEDURE — 83735 ASSAY OF MAGNESIUM: CPT | Performed by: PHYSICIAN ASSISTANT

## 2021-02-24 PROCEDURE — 36415 COLL VENOUS BLD VENIPUNCTURE: CPT | Performed by: INTERNAL MEDICINE

## 2021-02-24 PROCEDURE — 250N000013 HC RX MED GY IP 250 OP 250 PS 637: Performed by: HOSPITALIST

## 2021-02-24 PROCEDURE — 84100 ASSAY OF PHOSPHORUS: CPT | Performed by: PHYSICIAN ASSISTANT

## 2021-02-24 PROCEDURE — 258N000003 HC RX IP 258 OP 636: Performed by: HOSPITALIST

## 2021-02-24 PROCEDURE — 250N000011 HC RX IP 250 OP 636: Performed by: HOSPITALIST

## 2021-02-24 PROCEDURE — 84156 ASSAY OF PROTEIN URINE: CPT | Performed by: INTERNAL MEDICINE

## 2021-02-24 PROCEDURE — 80048 BASIC METABOLIC PNL TOTAL CA: CPT | Performed by: INTERNAL MEDICINE

## 2021-02-24 PROCEDURE — 82330 ASSAY OF CALCIUM: CPT | Performed by: INTERNAL MEDICINE

## 2021-02-24 PROCEDURE — 80048 BASIC METABOLIC PNL TOTAL CA: CPT | Performed by: PHYSICIAN ASSISTANT

## 2021-02-24 PROCEDURE — 83605 ASSAY OF LACTIC ACID: CPT | Performed by: INTERNAL MEDICINE

## 2021-02-24 PROCEDURE — 87086 URINE CULTURE/COLONY COUNT: CPT | Performed by: INTERNAL MEDICINE

## 2021-02-24 PROCEDURE — 85025 COMPLETE CBC W/AUTO DIFF WBC: CPT | Performed by: PHYSICIAN ASSISTANT

## 2021-02-24 RX ORDER — ONDANSETRON 2 MG/ML
4 INJECTION INTRAMUSCULAR; INTRAVENOUS EVERY 6 HOURS PRN
Status: DISCONTINUED | OUTPATIENT
Start: 2021-02-24 | End: 2021-02-26 | Stop reason: HOSPADM

## 2021-02-24 RX ORDER — HYDRALAZINE HYDROCHLORIDE 20 MG/ML
10 INJECTION INTRAMUSCULAR; INTRAVENOUS EVERY 4 HOURS PRN
Status: DISCONTINUED | OUTPATIENT
Start: 2021-02-24 | End: 2021-02-26 | Stop reason: HOSPADM

## 2021-02-24 RX ORDER — SODIUM CHLORIDE 9 MG/ML
INJECTION, SOLUTION INTRAVENOUS CONTINUOUS
Status: DISCONTINUED | OUTPATIENT
Start: 2021-02-24 | End: 2021-02-24 | Stop reason: HOSPADM

## 2021-02-24 RX ORDER — AMOXICILLIN 250 MG
2 CAPSULE ORAL 2 TIMES DAILY PRN
Status: DISCONTINUED | OUTPATIENT
Start: 2021-02-24 | End: 2021-02-26 | Stop reason: HOSPADM

## 2021-02-24 RX ORDER — LIDOCAINE 40 MG/G
CREAM TOPICAL
Status: DISCONTINUED | OUTPATIENT
Start: 2021-02-24 | End: 2021-02-26 | Stop reason: HOSPADM

## 2021-02-24 RX ORDER — HEPARIN SODIUM 5000 [USP'U]/.5ML
5000 INJECTION, SOLUTION INTRAVENOUS; SUBCUTANEOUS EVERY 12 HOURS
Status: DISCONTINUED | OUTPATIENT
Start: 2021-02-24 | End: 2021-02-25

## 2021-02-24 RX ORDER — ONDANSETRON 4 MG/1
4 TABLET, ORALLY DISINTEGRATING ORAL EVERY 6 HOURS PRN
Status: DISCONTINUED | OUTPATIENT
Start: 2021-02-24 | End: 2021-02-26 | Stop reason: HOSPADM

## 2021-02-24 RX ORDER — AMOXICILLIN 250 MG
1 CAPSULE ORAL 2 TIMES DAILY PRN
Status: DISCONTINUED | OUTPATIENT
Start: 2021-02-24 | End: 2021-02-26 | Stop reason: HOSPADM

## 2021-02-24 RX ORDER — BISACODYL 10 MG
10 SUPPOSITORY, RECTAL RECTAL DAILY PRN
Status: DISCONTINUED | OUTPATIENT
Start: 2021-02-24 | End: 2021-02-26 | Stop reason: HOSPADM

## 2021-02-24 RX ORDER — ACETAMINOPHEN 325 MG/1
650 TABLET ORAL EVERY 4 HOURS PRN
Status: DISCONTINUED | OUTPATIENT
Start: 2021-02-24 | End: 2021-02-26 | Stop reason: HOSPADM

## 2021-02-24 RX ORDER — SODIUM CHLORIDE 9 MG/ML
INJECTION, SOLUTION INTRAVENOUS CONTINUOUS
Status: DISCONTINUED | OUTPATIENT
Start: 2021-02-24 | End: 2021-02-25 | Stop reason: ALTCHOICE

## 2021-02-24 RX ORDER — LORAZEPAM 0.5 MG/1
.5-1 TABLET ORAL EVERY 4 HOURS PRN
Status: DISCONTINUED | OUTPATIENT
Start: 2021-02-24 | End: 2021-02-26 | Stop reason: HOSPADM

## 2021-02-24 RX ADMIN — ALBUTEROL SULFATE 2 PUFF: 90 AEROSOL, METERED RESPIRATORY (INHALATION) at 12:05

## 2021-02-24 RX ADMIN — DIAZEPAM 10 MG: 5 TABLET ORAL at 14:03

## 2021-02-24 RX ADMIN — SODIUM CHLORIDE: 9 INJECTION, SOLUTION INTRAVENOUS at 17:23

## 2021-02-24 RX ADMIN — CLONIDINE HYDROCHLORIDE 0.1 MG: 0.1 TABLET ORAL at 18:05

## 2021-02-24 RX ADMIN — NICOTINE 1 PATCH: 21 PATCH, EXTENDED RELEASE TRANSDERMAL at 06:57

## 2021-02-24 RX ADMIN — GABAPENTIN 300 MG: 300 CAPSULE ORAL at 05:00

## 2021-02-24 RX ADMIN — MULTIPLE VITAMINS W/ MINERALS TAB 1 TABLET: TAB at 07:46

## 2021-02-24 RX ADMIN — DESMOPRESSIN ACETATE 2 MCG: 4 SOLUTION INTRAVENOUS at 04:59

## 2021-02-24 RX ADMIN — HEPARIN SODIUM 5000 UNITS: 5000 INJECTION, SOLUTION INTRAVENOUS; SUBCUTANEOUS at 22:29

## 2021-02-24 RX ADMIN — LORAZEPAM 0.5 MG: 0.5 TABLET ORAL at 22:29

## 2021-02-24 RX ADMIN — THIAMINE HCL TAB 100 MG 100 MG: 100 TAB at 07:45

## 2021-02-24 RX ADMIN — LORAZEPAM 1 MG: 1 TABLET ORAL at 06:24

## 2021-02-24 RX ADMIN — DIAZEPAM 10 MG: 5 TABLET ORAL at 12:04

## 2021-02-24 RX ADMIN — GABAPENTIN 300 MG: 300 CAPSULE ORAL at 14:04

## 2021-02-24 RX ADMIN — SODIUM CHLORIDE: 9 INJECTION, SOLUTION INTRAVENOUS at 22:29

## 2021-02-24 RX ADMIN — CLONIDINE HYDROCHLORIDE 0.1 MG: 0.1 TABLET ORAL at 10:14

## 2021-02-24 RX ADMIN — ALBUTEROL SULFATE 2 PUFF: 90 AEROSOL, METERED RESPIRATORY (INHALATION) at 07:48

## 2021-02-24 RX ADMIN — CLONIDINE HYDROCHLORIDE 0.1 MG: 0.1 TABLET ORAL at 02:19

## 2021-02-24 RX ADMIN — FOLIC ACID 1 MG: 1 TABLET ORAL at 07:46

## 2021-02-24 RX ADMIN — ALBUTEROL SULFATE 2 PUFF: 90 AEROSOL, METERED RESPIRATORY (INHALATION) at 16:07

## 2021-02-24 RX ADMIN — DIAZEPAM 10 MG: 5 TABLET ORAL at 16:24

## 2021-02-24 ASSESSMENT — ACTIVITIES OF DAILY LIVING (ADL)
ADLS_ACUITY_SCORE: 10
ADLS_ACUITY_SCORE: 11
ADLS_ACUITY_SCORE: 10

## 2021-02-24 ASSESSMENT — MIFFLIN-ST. JEOR: SCORE: 1636.25

## 2021-02-24 NOTE — PROGRESS NOTES
Care Management Follow Up    Length of Stay (days): 2    Expected Discharge Date: 02/23/21     Concerns to be Addressed: substance/tobacco abuse/use. Considering Inpt vs Outpt treatment. Will need a [employer] Assessment completed outpatient prior to entering into treatment.  Patient plan of care discussed at interdisciplinary rounds: Yes    Anticipated Discharge Disposition: Home, Outpatient Chemical Dependency vs Inpatient Chemical Dependency  Disposition. Comments: Pt to contact [employer] for assessment.   Anticipated Discharge Services: Chemical Dependency Resources, Mental Health Resources  Anticipated Discharge DME:      Patient/family educated on Medicare website which has current facility and service quality ratings:    Education Provided on the Discharge Plan:    Patient/Family in Agreement with the Plan: yes    Referrals Placed by CM/SW: Internal Clinic Care Coordination, Scheduled Follow-up appointments, Community [facility]  Private pay costs discussed: Not applicable    Additional Information:  Reviewed pt's chart. Anticipate pt to transfer to higher level of care for renal consult, pt is refusing anywhere but the [facility]. Writer provided Power of  paperwork as requested to pt.       ZONIA Garcia  Care Transitions   Tele: [phone]

## 2021-02-24 NOTE — DISCHARGE SUMMARY
[facility]  Hospitalist Discharge Summary       Date of Admission:  2/21/2021  Date of Discharge:  2/24/2021  Discharging Provider: Angel Tan MD      Discharge Diagnoses     Acute kidney injury stage 3 due to ATN  Primary metabolic alkalosis   Primary respiratory acidosis  Acute respiratory failure with hypoxia and hypercarbia   Elevated d-dimer  Hypovolemic hyponatremia  Hyperphosphatemia  Hypocalcemia  Hypokalemia  Hypermagnesemia  Elevated AG - resolved  Leukocytosis with bandemia  Alcohol dependence with withdrawal  Nausea and vomiting  Dehydration  Mild intermittent asthma  Mild major depression  Anxiety  Cannabis use disorder, mild, abuse  Tobacco abuse  COVID-19 ruled out  History of COVID-19    Follow-ups Needed After Discharge     Unresulted Labs Ordered in the Past 30 Days of this Admission     Date and Time Order Name Status Description    2/24/2021 1345 Urine Culture Aerobic Bacterial In process       These results will be followed up by Angel Tan or PCP    Discharge Disposition   Discharged to [facility]  Condition at discharge: Stable    Hospital Course   Kyle Ayala is a 30 year old male admitted on 2/21/2021 for severe SORAIDA, alcohol abuse/withdrawal and electrolytes disturbances.     Acute kidney injury stage 3 due to ATN  Admit creatinine 5.20, GFR 14; baseline renal function unclear, no labs in the past year. Does have a prior history of acute kidney injury in the setting of withdrawal, normalized with IV hydration. Occurs in the setting of poor oral intake, nausea, and vomiting suspected due to withdrawal. Likely prerenal azotemia.   - Cr was 0.82 on 2/12/2020  - Creatinine 5.20 -> 4.69 -> 4.64->4.76->4.82->4.86->4.82 -> 4.93 -> 5.00  - GFR 14  - UA unremarkable other than proteinuria   - Urine protein/Cr 0.33  - Renal ultrasound with normal kidney size and cortical thickness, no obstruction  - On 2/22 FENa 0.5%, urine Na 17, on 2/24 urine Na  25  - CK 2319 -> 1369  - Saline lock 2/23 as appears euvolemic, allow patient to eat and drink  - Renal clearance worsening overnight, likely due to ATN  - Restart IV saline 100 ml/hr as urine Na only 25  - Transfer for Nephrology consult  - BMP q 12 hours     Primary metabolic alkalosis   Primary respiratory acidosis  Admit pH 7.51 / pCO2 65 / bicarb 51. ABG similar (pH 7.52 / pCO2 62 / pO2 74 / bicarb 51).  pCO2 higher than what would be expected for acute or chronic metabolic alkalosis.  Multiple severe metabolic or electrolyte derangements and new respiratory failure with hypoxia and hypercarbia. Known alcohol use (see below), denies illicit substances other than marijuana. Utox shows cannabinoids and benzos (Utox collected after he received Valium in the hospital).   - Elevated bicarb likely due to vomiting  - Unclear etiology for respiratory acidosis (diaphragm weakness from electrolyte derangements or asthma?)  - Address electrolyte abnormalities below  - Address respiratory failure below, now resolved  - Serum bicarb improving     Acute respiratory failure with hypoxia and hypercarbia   Elevated d-dimer  New 3-5L O2 need on admission. Intermittent chest pain. Chest x-ray unremarkable. VBG as noted above, patient both hypoxic and hypercarbic. Slight rhonchi on exam, no significant wheezing. COVID/influenza PCR negative. Patient likely hypoventilating for unclear reasons, cannot rule out PE (risk factor includes recent travel).  D-dimer 1.1.  - Continue supplemental O2 to maintain sats > 92%  - Empiric heparin drip for PE initiated on afternoon of 2/22  - VQ scan low risk, discontinued heparin drip evening of 2/22  - Incentive spirometry  - Bedside modified PFT's pending (PT unable to do full PFT's due to lack of equipment), slow vital capacity (but not forced) 2.7 L, NIF -60, PIF 60, and peak flow pre tx 250, post 280 L/min  - Hypoxia resolved, oxygenating well on RA     Hypovolemic hyponatremia  Admit sodium  120. Is hypovolemic. No fluid boluses given in the emergency department, started on D5 LR @ 150 ml/hr.   Sodium 120 -> 123 -> 127->128->130->128->131  Urine creatinine 114  Urine sodium 18 -> 17 -> 25  - Urine osm 372, serum osm 288, consistent with ADH activity  - Changed IV fluid to D5 1/2 NS + KCl 20 mEq @ 150 ml/hr on afternoon of 2/22  - DDAVP 2 mcg q 6 hours to slow down rapid correction, discontinue on 2/24 as sodium > 130  - Sodium checks q 12 hours     Hyperphosphatemia  Admit phosphate > 18. Likely due to SORAIDA or CKD, improving with IV fluids, but will monitor daily.  Denies phosphorus containing laxatives.  Phosphate 18 -> 13.3 -> 4.8 -> 3.5  - Check vitamin D to rule out vitamin D toxicity and   - , consistent with Vit D deficiency or CKD  - Improving, likely due to SORAIDA  - Daily phos level     Hypocalcemia  Admit Ca = 8.3, ionized 2.8. Possibly related to poor renal function.   - Monitor, no calcium supplementation for now  - Checking vitamin D levels as above  - Replace with calcium gluconate  - Ionized Ca daily     Hypokalemia  Admit K = 2.1. EKG with sinus tachycardia. Occurs in setting of recent emesis, likely causing K losses.   - K replacement protocol  - K normalized on 2/23     Hypermagnesemia  Admit Mg = 5.9. Due to dehydration vs CKD  Improving with IV fluids.  Mg 5.9 -> 3.9 -> 3.1 -> 2.9  - Daily Mg level     Elevated AG - resolved  Admit anion gap 47. Normalized after IV fluids despite persisting electrolyte abnormalities.  - Resolved     Leukocytosis with bandemia  Admit WBC 30.5, ANC 26.3, absolute monos 2.4. Afebrile. UA without evidence for infection. Chest x-ray clear. No obvious evidence of infection by history or exam.   - Daily CBC with diff  - No antibiotics indicated at this time, monitor for evidence of infection     Alcohol dependence with withdrawal  Drinks 8-10 shots of 99 proof alcohol daily. Last drink either 2/17 or 2/18, onset of withdrawal symptoms on 2/20. Prior  history of withdrawal, no known seizures. Has been through treatment in the past, last time was a few years ago. Expresses desire to quit knowing it is harmful to his health.  - CIWA protocol with gabapentin taper and prn Valium  - Daily vitamins  - Interested to get into alcohol rehab program - Care Transitions consult     Nausea and vomiting  Dehydration  Onset the past few days prior to admission, very poor oral intake. Patient hypovolemic with metabolic derangements likely due in part to losses from emesis.  - Volume resuscitate  - Now eating and drinking well  - Antiemetics available     Mild intermittent asthma  No controller inhalers, but uses prn albuterol. Mild rhonchi on exam, no significant wheeze. New respiratory failure as noted above, but does not appear to currently have asthma exacerbation.  - CXR unremarkable  - Albuterol qid     Mild major depression  Anxiety  Noted per problem list. Has used prn hydroxyzine for anxiety without significant relief. Not on other antianxiety/antidepressant medications prior to admission.  - Defer to PCP      Cannabis use disorder, mild, abuse  Admits to marijuana use, denies other recreational / illicit drugs. Utox is positive for cannabinoids.   - Encourage cessation     Tobacco abuse  Encourage cessation. Nicotine patch available     COVID-19 ruled out  History of COVID-19  Was COVID PCR positive on 12/30/20. Is within 90 day post COVID window, but was tested again on admission via PCR and was negative.   - COVID PCR negative 2/22  - No isolation or precautions indicated    Consultations This Hospital Stay   SOCIAL WORK IP CONSULT  PHARMACY TO DOSE HEPARIN  PHARMACY IP CONSULT  PHARMACY IP CONSULT  CARE MANAGEMENT / SOCIAL WORK IP CONSULT    Code Status   Full Code    Time Spent on this Encounter   I, Angel aTn MD, personally saw the patient today and spent greater than 30 minutes discharging this patient.       Angel Tan MD  [facility]  [facility]  ______________________________________________________________________    Physical Exam   Vital Signs: Temp: 98.1  F (36.7  C) Temp src: Oral BP: (!) 158/108 Pulse: 76   Resp: 18 SpO2: 99 % O2 Device: None (Room air)    Weight: 146 lbs 9.69 oz       Gen: Well nourished, well developed, alert and oriented x 3, no acute distressed  HEENT: Atraumatic, normocephalic; sclera non-injected, anicterric; oral mucosa moist, no lesion, no exudate  Lungs: Clear to ausculation, no wheezes, no rhonchi, no rales  Heart: Regular rate, regular rhythm, no gallops, no rubs, no murmurs  GI: Bowel sound normal, no hepatosplenomegaly, no masses, non-tender, non-distended, no guarding, no rebound tenderness  Lymph: No lymphadenopathy, no edema  Skin: No rashes, no chronic venous stasis     Primary Care Physician   Carrie Sanchez    Discharge Orders      Reason for your hospital stay    This is a 30 year old male admitted with acute kidney injury.     Intake and output    Every shift     Daily weights    Call Provider for weight gain of more than 2 pounds per day or 5 pounds per week.     Full Code       Significant Results and Procedures   Most Recent 3 CBC's:  Recent Labs   Lab Test 02/24/21  0538 02/23/21  0417 02/22/21  1546   WBC 15.2* 11.8* 20.0*   HGB 11.0* 10.8* 13.2*   MCV 93 92 88    169 226     Most Recent 3 BMP's:  Recent Labs   Lab Test 02/24/21  0538 02/23/21  1617 02/23/21  0918 02/23/21  0417   * 133  --  131*   POTASSIUM 3.9 3.7 3.0* 3.1*   CHLORIDE 94 92*  --  88*   CO2 33* 36*  --  40*   BUN 73* 65*  --  60*   CR 5.00* 4.93*  --  4.82*   ANIONGAP 5 5  --  3   SILVIANO 7.9* 7.8*  --  7.3*   GLC 89 98  --  117*     Most Recent 2 LFT's:  Recent Labs   Lab Test 02/22/21  0743 02/21/21  2235   AST 48* 41   ALT 40 38   ALKPHOS 70 83   BILITOTAL 0.7 0.8     Most Recent Urinalysis:  Recent Labs   Lab Test 02/24/21  1345   COLOR Yellow   APPEARANCE Clear   URINEGLC 50*   URINEBILI Negative    URINEKETONE Negative   SG 1.019   UBLD Negative   URINEPH 7.0   PROTEIN 30*   NITRITE Negative   LEUKEST Small*   RBCU 3*   WBCU 16*   ,   Results for orders placed or performed during the hospital encounter of 02/21/21   XR Chest Port 1 View    Narrative    EXAM: XR CHEST PORT 1 VW  LOCATION: [facility]  DATE/TIME: 2/22/2021 12:38 AM    INDICATION: Hypoxia  COMPARISON: 02/29/2016      Impression    IMPRESSION:     Heart size is normal. Lungs are clear bilaterally. Mediastinum and visualized bony structures are unremarkable.   US Renal Complete    Narrative    US RENAL COMPLETE 2/22/2021 11:29 AM    HISTORY: Acute kidney injury.    COMPARISON: None.    FINDINGS: The right kidney measures 11.2 x 6.1 x 4.9 cm with a  cortical thickness of 1.9 cm.  The left kidney measures 11.2 x 5.4 x  5.2cm with a cortical thickness of 1.8 cm. The kidneys show no  significant focal finding. The bladder is well distended and shows no  gross abnormality.      Impression    IMPRESSION: No hydronephrosis.    GERALD RITTER MD   NM Lung Scan Perfusion Particulate    Narrative    Examination:  NM LUNG SCAN PERFUSION PARTICULATE   Date:  2/22/2021 4:52 PM   Indication:    Chest pain, nonspecific; Respiratory failure; PE  suspected, low/intermediate prob, positive D-dimer   Previous Study: Chest radiograph 2/22/2021 at 00:40 hours  Technique: The patient received 4.30 mCi of Tc-99m labeled MAA  intravenously. A standard eight view lung perfusion scan was obtained.    Findings:  No perfusion abnormalities identified.      Impression    Impression:   Low probability of PE.    LESTER J FAHRNER, MD   XR Chest Port 1 View    Narrative    CHEST PORTABLE ONE VIEW  2/23/2021 1:40 PM     HISTORY: Hypoxia.    COMPARISON: February 22, 2021      Impression    IMPRESSION: Heart size is normal. No pleural effusion, pneumothorax,  or abnormal area of consolidation.    RAGHU BUSTOS MD       Discharge Medications   Current Discharge  Medication List      CONTINUE these medications which have NOT CHANGED    Details   gabapentin (NEURONTIN) 300 MG capsule Take 1 capsule (300 mg) by mouth 3 times daily  Qty: 90 capsule, Refills: 0    Associated Diagnoses: Alcoholism /alcohol abuse (H); Anxiety      hydrOXYzine (VISTARIL) 50 MG capsule Take 1-2 capsules ( mg) by mouth 3 times daily as needed for anxiety or other (Insomnia)  Qty: 30 capsule, Refills: 1    Associated Diagnoses: Anxiety      albuterol (PROAIR HFA/PROVENTIL HFA/VENTOLIN HFA) 108 (90 Base) MCG/ACT inhaler Inhale 2 puffs into the lungs every 6 hours as needed for shortness of breath / dyspnea or wheezing  Qty: 1 Inhaler, Refills: 0    Associated Diagnoses: Moderate persistent asthma without complication           Allergies   Allergies   Allergen Reactions     Penicillins Other (See Comments)     Patient says he was allergic to penicllin as a child but not anymore

## 2021-02-24 NOTE — PLAN OF CARE
"Pt up and walking independently around frequently, stated increase in anxiety, asking for ativan, stated \"walls are closing in\", refused aromatherapy. Plan to transfer to higher level of care for renal consult, refusing anywhere but the [facility].   "

## 2021-02-24 NOTE — PROGRESS NOTES
Currently patientdenies fever/chills. Denies any chest pain/palpitations. Denies any lightheadedness or syncope.Denies nausea/vomiting. Patient is pleasant and making plans to stop drinking and smoking.K has been replaced per protocol. Pt reports some anxiety, PRN Lorazepam given with positive effect.Pt independent in the room. Pt is compliant with IS up to 3000.Telemetry reviewed and demonstrate NSR.

## 2021-02-24 NOTE — PROGRESS NOTES
[facility]    Hospitalist Progress Note    Date of Service (when I saw the patient): 02/24/2021    Assessment & Plan   Kyle Ayala is a 30 year old male admitted on 2/21/2021 for severe SORAIDA, alcohol abuse/withdrawal and electrolytes disturbances.     Acute kidney injury stage 3 due to ATN  Admit creatinine 5.20, GFR 14; baseline renal function unclear, no labs in the past year. Does have a prior history of acute kidney injury in the setting of withdrawal, normalized with IV hydration. Occurs in the setting of poor oral intake, nausea, and vomiting suspected due to withdrawal. Likely prerenal azotemia.   - Cr was 0.82 on 2/12/2020  - Creatinine 5.20 -> 4.69 -> 4.64->4.76->4.82->4.86->4.82 -> 4.93 -> 5.00  - GFR 14  - UA unremarkable other than proteinuria   - Renal ultrasound with normal kidney size and cortical thickness, no obstruction  - FeNa 0.5%, urine Na 17  - CK 2319 -> 1369  - Saline lock 2/23 as appears euvolemic, allow patient to eat and drink  - Renal clearance worsening overnight, likely due to ATN  - Transfer for Nephrology consult  - BMP q 12 hours     Primary metabolic alkalosis   Primary respiratory acidosis  Admit pH 7.51 / pCO2 65 / bicarb 51. ABG similar (pH 7.52 / pCO2 62 / pO2 74 / bicarb 51).  pCO2 higher than what would be expected for acute or chronic metabolic alkalosis.  Multiple severe metabolic or electrolyte derangements and new respiratory failure with hypoxia and hypercarbia. Known alcohol use (see below), denies illicit substances other than marijuana. Utox shows cannabinoids and benzos (Utox collected after he received Valium in the hospital).   - Elevated bicarb likely due to vomiting  - Unclear etiology for respiratory acidosis (diaphragm weakness from electrolyte derangements or asthma?)  - Address electrolyte abnormalities below  - Address respiratory failure below  - Serum bicarb improving  - Daily VBG's     Acute respiratory failure with hypoxia  and hypercarbia   Elevated d-dimer  New 3-5L O2 need on admission. Intermittent chest pain. Chest x-ray unremarkable. VBG as noted above, patient both hypoxic and hypercarbic. Slight rhonchi on exam, no significant wheezing. COVID/influenza PCR negative. Patient likely hypoventilating for unclear reasons, cannot rule out PE (risk factor includes recent travel).  D-dimer 1.1.  - Continue supplemental O2 to maintain sats > 92%  - Empiric heparin drip for PE initiated on afternoon of 2/22  - VQ scan low risk, discontinued heparin drip evening of 2/22  - Daily VBG as above  - Incentive spirometry  - Bedside modified PFT's pending (PT unable to do full PFT's due to lack of equipment; is able to do slow vital capacity (but not forced), NIF, and peak flow)  - Hypoxia resolved, oxygenating well on RA     Hypovolemic hyponatremia  Admit sodium 120. Is hypovolemic. No fluid boluses given in the emergency department, started on D5 LR @ 150 ml/hr.   Sodium 120 -> 123 -> 127->128->130->128->131  Urine creatinine 114  Urine sodium 18 -> 17  - Urine osm 372, serum osm 288, consistent with ADH activity  - Changed IV fluid to D5 1/2 NS + KCl 20 mEq @ 150 ml/hr on afternoon of 2/22  - DDAVP 2 mcg q 6 hours to slow down rapid correction, discontinue on 2/24 as sodium > 130  - Sodium checks q 12 hours     Hyperphosphatemia  Admit phosphate > 18. Likely due to SORAIDA or CKD, improving with IV fluids, but will monitor daily.  Denies phosphorus containing laxatives.  Phosphate 18 -> 13.3 -> 4.8 -> 3.5  - Check vitamin D to rule out vitamin D toxicity and   - , consistent with Vit D deficiency or CKD  - Improving, likely due to SORAIDA  - Daily phos level     Hypocalcemia  Admit Ca = 8.3, ionized 2.8. Possibly related to poor renal function.   - Monitor, no calcium supplementation for now  - Checking vitamin D levels as above  - Replace with calcium gluconate  - Ionized Ca daily     Hypokalemia  Admit K = 2.1. EKG with sinus tachycardia.  Occurs in setting of recent emesis, likely causing K losses.   - K replacement protocol  - K normalized on 2/23     Hypermagnesemia  Admit Mg = 5.9. Due to dehydration vs CKD  Improving with IV fluids.  Mg 5.9 -> 3.9 -> 3.1 -> 2.9  - Daily Mg level     Elevated AG - resolved  Admit anion gap 47. Normalized after IV fluids despite persisting electrolyte abnormalities.  - Resolved     Leukocytosis with bandemia  Admit WBC 30.5, ANC 26.3, absolute monos 2.4. Afebrile. UA without evidence for infection. Chest x-ray clear. No obvious evidence of infection by history or exam.   - Daily CBC with diff  - No antibiotics indicated at this time, monitor for evidence of infection     Alcohol dependence with withdrawal  Drinks 8-10 shots of 99 proof alcohol daily. Last drink either 2/17 or 2/18, onset of withdrawal symptoms on 2/20. Prior history of withdrawal, no known seizures. Has been through treatment in the past, last time was a few years ago. Expresses desire to quit knowing it is harmful to his health.  - CIWA protocol with gabapentin taper and prn Valium  - Daily vitamins  - Interested to get into alcohol rehab program - Care Transitions consult     Nausea and vomiting  Dehydration  Onset the past few days prior to admission, very poor oral intake. Patient hypovolemic with metabolic derangements likely due in part to losses from emesis.  - Volume resuscitate  - Now eating and drinking well  - Antiemetics available     Mild intermittent asthma  No controller inhalers, but uses prn albuterol. Mild rhonchi on exam, no significant wheeze. New respiratory failure as noted above, but does not appear to currently have asthma exacerbation.  - CXR unremarkable  - Albuterol qid     Mild major depression  Anxiety  Noted per problem list. Has used prn hydroxyzine for anxiety without significant relief. Not on other antianxiety/antidepressant medications prior to admission.  - Defer to PCP      Cannabis use disorder, mild,  abuse  Admits to marijuana use, denies other recreational / illicit drugs. Utox is positive for cannabinoids.   - Encourage cessation     Tobacco abuse  Encourage cessation. Nicotine patch available     COVID-19 ruled out  History of COVID-19  Was COVID PCR positive on 12/30/20. Is within 90 day post COVID window, but was tested again on admission via PCR and was negative.   - COVID PCR negative 2/22  - No isolation or precautions indicated    Diet: Regular Diet  DVT Prophylaxis: Pneumatic Compression Devices  Vazquez Catheter: not present  Code Status: Full Code  - discussed with patient    Disposition: Expected transfer to higher level of care when bed available    Angel Jones Dominick    Interval History   The patient is sitting in a chair.  He is anxious and frustrated about not being able to leave hospital.    -Data reviewed today: I reviewed all new labs and imaging results over the last 24 hours. I personally reviewed no images or EKG's today.    Physical Exam   Temp: 98.4  F (36.9  C) Temp src: Oral BP: (!) 153/94 Pulse: 80   Resp: 18 SpO2: 99 % O2 Device: None (Room air)    Vitals:    02/22/21 0315 02/23/21 0500 02/24/21 0400   Weight: 62.4 kg (137 lb 9.1 oz) 64.6 kg (142 lb 6.7 oz) 66.5 kg (146 lb 9.7 oz)     Vital Signs with Ranges  Temp:  [97.9  F (36.6  C)-98.6  F (37  C)] 98.4  F (36.9  C)  Pulse:  [69-91] 80  Resp:  [12-27] 18  BP: (109-153)/() 153/94  SpO2:  [91 %-100 %] 99 %  I/O last 3 completed shifts:  In: 1370 [P.O.:860; I.V.:510]  Out: 300 [Urine:300]    Gen: Well nourished, well developed, alert and oriented x 3, no acute distressed  HEENT: Atraumatic, normocephalic; sclera non-injected, anicterric; oral mucosa moist, no lesion, no exudate  Lungs: Clear to ausculation, no wheezes, no rhonchi, no rales  Heart: Regular rate, regular rhythm, no gallops, no rubs, no murmurs  GI: Bowel sound normal, no hepatosplenomegaly, no masses, non-tender, non-distended, no guarding, no rebound  tenderness  Lymph: No lymphadenopathy, no edema  Skin: No rashes, no chronic venous stasis     Medications       albuterol  2 puff Inhalation 4x daily     cloNIDine  0.1 mg Oral Q8H     folic acid  1 mg Oral Daily     [START ON 3/1/2021] gabapentin  100 mg Oral Q8H     gabapentin  300 mg Oral Q8H     multivitamin w/minerals  1 tablet Oral Daily     nicotine   Transdermal Q8H     [START ON 3/1/2021] thiamine  100 mg Oral Daily       Data   Recent Labs   Lab 02/24/21  0538 02/23/21  1617 02/23/21  0918 02/23/21  0417 02/22/21  1546 02/22/21  1546 02/22/21  0743 02/22/21  0743 02/21/21  2235 02/21/21 2235   WBC 15.2*  --   --  11.8*  --  20.0*  --  23.4*  --  30.5*   HGB 11.0*  --   --  10.8*  --  13.2*  --  14.0  --  16.1   MCV 93  --   --  92  --  88  --  87  --  88     --   --  169  --  226  --  256  --  369   * 133  --  131*   < >  --    < > 127*   < > 120*   POTASSIUM 3.9 3.7 3.0* 3.1*   < >  --    < > 2.9*   < > 2.1*   CHLORIDE 94 92*  --  88*   < >  --    < > 71*   < > 52*   CO2 33* 36*  --  40*   < >  --    < > 44*   < > 21   BUN 73* 65*  --  60*   < >  --    < > 53*   < > 55*   CR 5.00* 4.93*  --  4.82*   < >  --    < > 4.64*   < > 5.20*   ANIONGAP 5 5  --  3   < >  --    < > 12   < > 47*   SILVIANO 7.9* 7.8*  --  7.3*   < >  --    < > 7.6*   < > 8.3*   GLC 89 98  --  117*   < >  --    < > 117*   < > 189*   ALBUMIN  --   --   --   --   --   --   --  3.8  --  4.6   PROTTOTAL  --   --   --   --   --   --   --  7.3  --  9.2*   BILITOTAL  --   --   --   --   --   --   --  0.7  --  0.8   ALKPHOS  --   --   --   --   --   --   --  70  --  83   ALT  --   --   --   --   --   --   --  40  --  38   AST  --   --   --   --   --   --   --  48*  --  41    < > = values in this interval not displayed.       Recent Results (from the past 24 hour(s))   XR Chest Port 1 View    Narrative    CHEST PORTABLE ONE VIEW  2/23/2021 1:40 PM     HISTORY: Hypoxia.    COMPARISON: February 22, 2021      Impression    IMPRESSION:  Heart size is normal. No pleural effusion, pneumothorax,  or abnormal area of consolidation.    RAGHU BUSTOS MD

## 2021-02-25 LAB
ANION GAP SERPL CALCULATED.3IONS-SCNC: 4 MMOL/L (ref 3–14)
BACTERIA SPEC CULT: NO GROWTH
BASOPHILS # BLD AUTO: 0 10E9/L (ref 0–0.2)
BASOPHILS NFR BLD AUTO: 0.3 %
BUN SERPL-MCNC: 80 MG/DL (ref 7–30)
CALCIUM SERPL-MCNC: 8.3 MG/DL (ref 8.5–10.1)
CHLORIDE SERPL-SCNC: 98 MMOL/L (ref 94–109)
CO2 SERPL-SCNC: 32 MMOL/L (ref 20–32)
CREAT SERPL-MCNC: 4.43 MG/DL (ref 0.66–1.25)
DIFFERENTIAL METHOD BLD: ABNORMAL
EOSINOPHIL # BLD AUTO: 0.4 10E9/L (ref 0–0.7)
EOSINOPHIL NFR BLD AUTO: 2.7 %
ERYTHROCYTE [DISTWIDTH] IN BLOOD BY AUTOMATED COUNT: 12.6 % (ref 10–15)
GFR SERPL CREATININE-BSD FRML MDRD: 17 ML/MIN/{1.73_M2}
GLUCOSE SERPL-MCNC: 155 MG/DL (ref 70–99)
HCT VFR BLD AUTO: 31.6 % (ref 40–53)
HGB BLD-MCNC: 10.6 G/DL (ref 13.3–17.7)
IMM GRANULOCYTES # BLD: 0.1 10E9/L (ref 0–0.4)
IMM GRANULOCYTES NFR BLD: 0.6 %
LYMPHOCYTES # BLD AUTO: 1.5 10E9/L (ref 0.8–5.3)
LYMPHOCYTES NFR BLD AUTO: 10.9 %
Lab: NORMAL
MCH RBC QN AUTO: 31.2 PG (ref 26.5–33)
MCHC RBC AUTO-ENTMCNC: 33.5 G/DL (ref 31.5–36.5)
MCV RBC AUTO: 93 FL (ref 78–100)
MONOCYTES # BLD AUTO: 1 10E9/L (ref 0–1.3)
MONOCYTES NFR BLD AUTO: 7.6 %
NEUTROPHILS # BLD AUTO: 10.5 10E9/L (ref 1.6–8.3)
NEUTROPHILS NFR BLD AUTO: 77.9 %
NRBC # BLD AUTO: 0 10*3/UL
NRBC BLD AUTO-RTO: 0 /100
PLATELET # BLD AUTO: 167 10E9/L (ref 150–450)
POTASSIUM SERPL-SCNC: 3.9 MMOL/L (ref 3.4–5.3)
RBC # BLD AUTO: 3.4 10E12/L (ref 4.4–5.9)
SODIUM SERPL-SCNC: 134 MMOL/L (ref 133–144)
SPECIMEN SOURCE: NORMAL
WBC # BLD AUTO: 13.5 10E9/L (ref 4–11)

## 2021-02-25 PROCEDURE — 120N000001 HC R&B MED SURG/OB

## 2021-02-25 PROCEDURE — 80048 BASIC METABOLIC PNL TOTAL CA: CPT | Performed by: HOSPITALIST

## 2021-02-25 PROCEDURE — 258N000003 HC RX IP 258 OP 636: Performed by: HOSPITALIST

## 2021-02-25 PROCEDURE — 36415 COLL VENOUS BLD VENIPUNCTURE: CPT | Performed by: HOSPITALIST

## 2021-02-25 PROCEDURE — 258N000003 HC RX IP 258 OP 636: Performed by: INTERNAL MEDICINE

## 2021-02-25 PROCEDURE — 250N000013 HC RX MED GY IP 250 OP 250 PS 637: Performed by: INTERNAL MEDICINE

## 2021-02-25 PROCEDURE — 99232 SBSQ HOSP IP/OBS MODERATE 35: CPT | Performed by: INTERNAL MEDICINE

## 2021-02-25 PROCEDURE — 99254 IP/OBS CNSLTJ NEW/EST MOD 60: CPT | Performed by: INTERNAL MEDICINE

## 2021-02-25 PROCEDURE — 250N000013 HC RX MED GY IP 250 OP 250 PS 637: Performed by: HOSPITALIST

## 2021-02-25 PROCEDURE — 85025 COMPLETE CBC W/AUTO DIFF WBC: CPT | Performed by: HOSPITALIST

## 2021-02-25 RX ORDER — NICOTINE 21 MG/24HR
1 PATCH, TRANSDERMAL 24 HOURS TRANSDERMAL DAILY
Status: DISCONTINUED | OUTPATIENT
Start: 2021-02-25 | End: 2021-02-26 | Stop reason: HOSPADM

## 2021-02-25 RX ORDER — SODIUM CHLORIDE, SODIUM LACTATE, POTASSIUM CHLORIDE, CALCIUM CHLORIDE 600; 310; 30; 20 MG/100ML; MG/100ML; MG/100ML; MG/100ML
INJECTION, SOLUTION INTRAVENOUS CONTINUOUS
Status: DISCONTINUED | OUTPATIENT
Start: 2021-02-25 | End: 2021-02-26 | Stop reason: HOSPADM

## 2021-02-25 RX ADMIN — ACETAMINOPHEN 650 MG: 325 TABLET, FILM COATED ORAL at 01:37

## 2021-02-25 RX ADMIN — SODIUM CHLORIDE: 9 INJECTION, SOLUTION INTRAVENOUS at 07:28

## 2021-02-25 RX ADMIN — ACETAMINOPHEN 650 MG: 325 TABLET, FILM COATED ORAL at 14:23

## 2021-02-25 RX ADMIN — SODIUM CHLORIDE, POTASSIUM CHLORIDE, SODIUM LACTATE AND CALCIUM CHLORIDE: 600; 310; 30; 20 INJECTION, SOLUTION INTRAVENOUS at 14:23

## 2021-02-25 RX ADMIN — LORAZEPAM 1 MG: 0.5 TABLET ORAL at 21:09

## 2021-02-25 RX ADMIN — NICOTINE 1 PATCH: 14 PATCH, EXTENDED RELEASE TRANSDERMAL at 14:23

## 2021-02-25 RX ADMIN — Medication 1 MG: at 21:10

## 2021-02-25 ASSESSMENT — ACTIVITIES OF DAILY LIVING (ADL)
ADLS_ACUITY_SCORE: 10

## 2021-02-25 NOTE — PLAN OF CARE
Summary:   Pt initially admitted to [facility] with alcohol withdrawal. Found to have SORAIDA, not improving with IV Fluid. Transferred here for nephrology consult.  DATE & TIME: 2/24/2021  8732-2620  Cognitive Concerns/ Orientation : A&OX4  BEHAVIOR & AGGRESSION TOOL COLOR: Green  CIWA SCORE: NA / completed  ABNL VS/O2:VSS on RA  MOBILITY: Ind  PAIN MANAGMENT: Tylenol given for abd discomfort, pt was complaining discomfort after he got Heparin shot on evening shift   DIET: Regular  BOWEL/BLADDER: Continent  ABNL LAB/BG: Crt of 4.88  DRAIN/DEVICES: PIV with IVF at 100mL/hr  TELEMETRY RHYTHM: NA  SKIN: Intact except for small bruises  TESTS/PROCEDURES: NA  D/C DAY/GOALS/PLACE: Pending  OTHER IMPORTANT INFO: Pt was little anxious on evening shift, ativan given per pt request. Continue to monitor.

## 2021-02-25 NOTE — H&P
H & P dictated # [id number]    Transfer from [facility] for nephrology evaluation for SORAIDA not improved with IV hydration

## 2021-02-25 NOTE — H&P
Admitted:     02/24/2021      CHIEF COMPLAINT:  Transfer from [facility] for Nephrology evaluation.      HISTORY OF PRESENT ILLNESS:  Mr. Kyle Ayala is a 30-year-old male with a history of alcohol abuse, cannabis use and mild asthma who presented to [facility] on 02/21/2021 with 10 days of nausea and vomiting.  He was noted to be in severe acute kidney injury, was also noted with alcohol abuse and withdrawal and had several electrolyte abnormalities including hyponatremia, hypokalemia, and creatinine was noted at 5.2.  He was also noted to be hypophosphatemic and hypocalcemic.  He was also noted in metabolic alkalosis and primary respiratory acidosis.  He was managed for alcohol withdrawal and was resuscitated with IV hydration.  His alcohol withdrawal subsided, his respiratory failure improved, and electrolyte abnormalities corrected as well.  However, his renal function remained persistently worsened with no significant improvement in creatinine, and thus he was transferred to [facility] for Nephrology evaluation.  Here, I see the patient on station 66.  He denies any active complaints at this time apart from feeling anxious, denies any fever, chills or rigors.  No chest pain or shortness of breath.  Denies pain in abdomen.  Reports a normal bowel and bladder habit.      REVIEW OF SYSTEMS:  A 10-point review of system was done and was negative apart from those mentioned in the history of present illness.      PAST MEDICAL HISTORY:   1.  Alcohol abuse and dependence.   2.  Cannabis use.   3.  Mild asthma.        MEDICATIONS PRIOR TO ADMISSION:  Prior to Admission medications    Medication Sig Last Dose Taking? Auth Provider   albuterol (PROAIR HFA/PROVENTIL HFA/VENTOLIN HFA) 108 (90 Base) MCG/ACT inhaler Inhale 2 puffs into the lungs every 6 hours as needed for shortness of breath / dyspnea or wheezing  Patient not taking: Reported on 2/12/2020   Carrie Sanchez APRN CNP    hydrOXYzine (VISTARIL) 50 MG capsule Take 1-2 capsules ( mg) by mouth 3 times daily as needed for anxiety or other (Insomnia)   Carrie Sanchez APRN CNP          ALLERGIES:  PENICILLIN.      SOCIAL HISTORY:  He reports significant alcohol use, also reports smoking marijuana and smokes about 3/4 of a pack a day.  Denies other IV illicit drug use.      FAMILY HISTORY:  Reviewed and not pertinent to current presentation.      PHYSICAL EXAMINATION:   GENERAL:  The patient is conscious, alert, awake, oriented x 3, lying comfortably in bed in no apparent distress.   VITAL SIGNS:  Temperature 99.6, heart rate of 75, blood pressure 152/99, saturation 96% on room air.   HEENT:  Pupils are equal and reactive to light and accommodation.  Extraocular movements are intact.  Oral mucosa is moist.   NECK:  Supple, no raised JVD.   RESPIRATORY:  Lung sounds bilaterally clear to auscultation, no wheezes or crepitation.   CARDIOVASCULAR:  Normal S1 and S2, regular rate and rhythm, no murmur.   ABDOMEN:  Soft, nontender, nondistended, no guarding, rigidity or rebound tenderness.   LOWER EXTREMITIES:  With no edema.   NEUROLOGIC:  No focal neurological deficits noted.  Cranial nerves II-XII grossly intact.   PSYCHIATRIC: slightly anxious.  Normal affect.      LABORATORY AND IMAGING DATA:  Reviewed in Epic.    Significant labs done at [facility] included initial BMP with note of sodium 120, potassium 2.1, BUN 55, creatinine 5.2, anion gap of 47, phosphorus level was 18, ionized calcium 2.8, BMP from today noted with a sodium 136, creatinine 4.88, calcium 8.1, magnesium 2.9, phosphorus has trended down to 3.5.      Urine sodium on admission was noted at 17.  TSH suppressed at 0.2, free T4 normal at 1.28.  CBC on admission noted with WBC of 30.5, trended down to 15.2.  Hemoglobin was 16 on admission, trended down and stabilized at around 11.      UA on 02/22 was unremarkable.  UA from 02/24 noted with a WBC 16, small  leukocyte esterase.  Urine culture pending.  COVID from 02/22 was negative.  Urine toxicology 02/22 was positive for cannabinoids and benzodiazepines.      VQ scan from 02/22 showed no perfusion abnormality, low probability PE.  Ultrasound from 02/22 was noted unremarkable with no hydronephrosis.      ASSESSMENT AND PLAN:  Mr. Kyle Ayala is a 30-year-old male with alcohol abuse/dependence, cannabis use, mild asthma who was transferred from [facility] for a Nephrology evaluation due to no significant improvement in his renal function as noted in HPI.  He had presented there with 10 days of nausea, vomiting and was noted with alcohol withdrawal and several electrolyte abnormalities.  His symptoms and electrolytes improved with IV hydration, but renal function has shown no significant improvement.      1.  Acute renal failure, likely acute tubular necrosis:    - On admission, BUN was 55, creatinine of 5.2, no significant improvement with IV hydration.  Creatinine has persisted around 4-5.  Urine sodium was 17.  Renal ultrasound showed no hydronephrosis.  We will consult Nephrology.  His fluid currently is normal saline at 100 mL per hour.  We will continue with same.  His anion gap has trended down from 47 to 5.  CK has trended down, was 2319, trended down to 1369.  He is making good urine.  We will defer further management to Nephrology.     2.  Acute respiratory failure with hypoxia and hypercarbia.   3.  Elevated D-dimer:    - On admission, he was needing 3-5 liters oxygen.  A chest x-ray was unremarkable.  A D-dimer was elevated at 1.1.  VQ scan was done, which showed no PE.  His respiratory status has improved, and he is currently on room air.     4.  Hypovolemic hyponatremia.   5.  Hyperphosphatemia.   6.  Hypocalcemia.   7.  Hypokalemia.   8.  Hypermagnesemia.   9.  Elevated anion gap:    - Most of his electrolytes have corrected; sodium trended up from 120 to 131.  Phosphate has trended down from 18 to  3.5.  He received calcium gluconate at [facility].  His potassium has corrected.  We will monitor BMP.     10.  Alcohol dependence with withdrawal:   - He was treated with a gabapentin and Valium at [facility] He is currently not in withdrawal.  He was evaluated by Chemical Dependency and was suggested for Rule 25 assessment.     11.  Nausea and vomiting:    - His symptoms have completely subsided at this time, and he is tolerating oral well.     12.  Mild intermittent asthma:    - Respiratory status currently stable.  We will have p.r.n. inhalers available.      DEEP VENOUS THROMBOSIS PROPHYLAXIS:  With subcutaneous heparin.      CODE STATUS:  Full code.         GUSTAVO DE MD             D: 2021   T: 2021   MT: TJ      Name:     SHIV GARCÍA   MRN:      [id number]        Account:      [id number]   :      1990        Admitted:     2021                   Document: [id number]       cc: LIZA NAVAS, CNP

## 2021-02-25 NOTE — PROGRESS NOTES
[facility] TRANSPORT NOTE  Data:   Reason for Transport:  Higher level of care to [facility] for potential dialysis.     Kyle Ayala was transported from ICU via cart at 1940.  Patient was accompanied by [facility]. Equipment used for transport: None. Family was aware of reason for transport: yes    Action:  Report: given to Tim Waite RN by Gita GUNN- see this RN's previous note.     Response:  Patient's condition when transferred off unit was Stable.    Deborah Morin, RN

## 2021-02-25 NOTE — PLAN OF CARE
Summary:   Pt initially admitted to [facility] with alcohol withdrawal. Found to have SORAIDA, not improving with IV Fluid. Transferred here for nephrology consult.  DATE & TIME: 2/25/2021  [id number]  Cognitive Concerns/ Orientation : A&OX4  BEHAVIOR & AGGRESSION TOOL COLOR: Green  CIWA SCORE: NA / completed  ABNL VS/O2:VSS on RA  MOBILITY: Ind  PAIN MANAGMENT: Tylenol PRN x1, reports pain improved this shift  DIET: Regular  BOWEL/BLADDER: Continent  ABNL LAB/BG: Crt of 4.43 improving  DRAIN/DEVICES: PIV with IVF at 75mL/hr  TELEMETRY RHYTHM: NA  SKIN: Intact except for small bruises  TESTS/PROCEDURES: NA  D/C DAY/GOALS/PLACE: Per MD, today or tomorrow after nephrology consult.  OTHER IMPORTANT INFO: Pt was little anxious on evening shift, ativan given per pt request. Continue to monitor.

## 2021-02-25 NOTE — PROGRESS NOTES
"CLINICAL NUTRITION SERVICES  -  ASSESSMENT NOTE      RECOMMENDATIONS FOR MD/PROVIDER TO ORDER:   Recommend ordering Thiamine and Folic Acid supplements   Malnutrition:   % Weight Loss:  None noted  % Intake:  <75% for > 7 days (non-severe malnutrition) - per MD documentation  Subcutaneous Fat Loss:  None observed  Muscle Loss:  None observed  Fluid Retention:  None noted    Malnutrition Diagnosis: Patient does not meet two of the above criteria necessary for diagnosing malnutrition       REASON FOR ASSESSMENT  Kyle Ayala is a 30 year old male seen by Registered Dietitian for Nutrition Admission Risk Screen Received -   Have you recently lost weight without trying in the last 6 months ? - \"yes\" 2-13 lbs  Have you been eating poorly due to a decreased appetite ?- \"yes\"    DX:  SORAIDA (severe)    PMH:  Alcohol abuse and dependence  Cannabis use  Mild asthma    NUTRITION HISTORY  - Information obtained from patient and chart  Pt had nausea/vomiting for 10 days prior to admission w/ very poor oral intake per MD note. Pt states he has not noticed a change in his appetite prior to admission. His intake varies - some days he won't eat anything and other days he will eat a lot. He and his girlfriend mainly cook for themselves, diet typically consists of pasta and some salads. Pt expressed that he would like to start eating healthier after discharge.       CURRENT NUTRITION ORDERS  Diet Order:     Regular     Current Intake/Tolerance:  Per chart review - pt is eating ok. Pt states his appetite is really good, said he ate his whole breakfast tray, denies any ONS at this time.       NUTRITION FOCUSED PHYSICAL ASSESSMENT FOR DIAGNOSING MALNUTRITION)         Observed:    No nutrition-related physical findings observed      ANTHROPOMETRICS  Height: 177.8 cm (5' 10\")   Weight: 146 lbs 9.7 oz (66.5 kg)   BMI: 21.04 kg/m2  Weight Status:  Normal BMI  IBW: 75 kg  % IBW: 89%  Weight History: pt reports he hasn't noticed any weight " loss but mentioned that he can eat as much as he wants and not gain any weight   Wt Readings from Last 10 Encounters:   02/24/21 66.5 kg (146 lb 9.7 oz)   02/12/20 59.9 kg (132 lb 1.6 oz)   02/10/20 68 kg (150 lb)     LABS  Labs reviewed    BUN - 80 (H)  Cr - 4.43 (H)     MEDICATIONS  Medications reviewed    PRN Dulcolax; Milk of Magnesia; Zofran; Miralax; Senna    ASSESSED NUTRITION NEEDS PER APPROVED PRACTICE GUIDELINES:    Dosing Weight 67 kg - current weight  Estimated Energy Needs: 1675 - 2010 kcals (25-30 Kcal/Kg)  Justification: maintenance  Estimated Protein Needs: 67 - 80 grams protein (1-1.2 g pro/Kg)  Justification: maintenance  Estimated Fluid Needs: 1675 - 2010 mL (1 mL/Kcal)  Justification: maintenance    MALNUTRITION:  % Weight Loss:  None noted  % Intake:  <75% for > 7 days (non-severe malnutrition) - per MD documentation  Subcutaneous Fat Loss:  None observed  Muscle Loss:  None observed  Fluid Retention:  None noted    Malnutrition Diagnosis: Patient does not meet two of the above criteria necessary for diagnosing malnutrition    NUTRITION DIAGNOSIS:  Predicted inadequate nutrient intake (protein and energy) related to nausea/vomiting/poor intake secondary to EtOH withdrawal and EtOH dependence.       NUTRITION INTERVENTIONS  Recommendations / Nutrition Prescription  Continue current diet.      Implementation  Nutrition education: Provided education on importance of PO intake even with reduced appetite.      Nutrition Goals  Pt will consume >75% of nutritionally adequate meals TID.      MONITORING AND EVALUATION:  Progress towards goals will be monitored and evaluated per protocol and Practice Guidelines    Alessandra Dupree  Dietetic Intern

## 2021-02-25 NOTE — PROGRESS NOTES
[facility]    Hospitalist Progress Note    Assessment & Plan   Kyle Ayala is a 30 year old male with PMHx of alcohol abuse/dependence, cannabis use and mild asthma. He was admitted to [facility] on 2/21/2021 with a 10d hx of nausea/vomiting and alcohol withdrawal.  Had noted electrolyte disturbance and elevated Cr (5.2). His electrolytes were replaced and he was given IV hydration, however his renal function did not significantly improve, prompting transfer to [facility] on 2/24/2021 for evaluation per nephrology.      Acute Renal Failure, suspect secondary to ATN  On admission to [facility], had marked lab disturbances including BUN 55 and Cr 5.2. No significant improvement after IV hydration. Cr remains 4-5. Additional workup pursued -- UA bland other than some proteinuria, FENa 0.5%, Salvatore 17 2/22 -- 25 on 2/24, renal US unremarkable, CK 2300 --1300, AG improved. Continues to make urine.  -- Cr slowing improving, making some urine  -- trending labs  -- ?stop IVFs  -- nephrology consulted for further recommendations    Hypovolemic hyponatremia: Resolved  Hyperphosphatemia: Resolved  Hypocalcemia: Improved  Hypokalemia: Resolved  Hypermagnesemia: Improved  Elevated anion gap: Resolved  On admission to [facility], Na 120, K 2.1, Ca 8.3, Phos >18 an anion gap 47.  Na improved with IVFs, did require DDAVP at one point to slow rate correction. Elevated phos attributed to SORAIDA. PTH elevated (548) consistent with Vit D deficiency vs CKD. Vitamin D level low. Given additional Ca (with calcium gluconate). K replacement. Mag normalized with IVFs.   -- monitor serial BMPs     Acute respiratory failure with hypoxia and hypercarbia: Improved   Elevated d-dimer  On admission to [facility], he was needing 3-5 liters oxygen.  A chest x-ray was unremarkable. COVID/influenza PCR negative. A D-dimer was elevated at 1.1.  VQ scan was done, which showed no PE. His respiratory status has improved and is not  maintaining sats on RA      Leukocytosis: Improving  Admit WBC 30.5. Afebrile. UA without evidence for infection. Chest x-ray clear. No obvious evidence of infection by history or exam. Suspected secondary to above, no indication for antibiotics.  -- monitor CBC periodically, remains afebrile and no s/sx of infection     Alcohol dependence with withdrawal  Drinks 8-10 shots of 99 proof alcohol daily. Last drink either 2/17 or 2/18, onset of withdrawal symptoms on 2/20. Prior history of withdrawal, no known seizures. Has been through treatment in the past, last time was a few years ago. Expresses desire to quit knowing it is harmful to his health. Treated with gabapentin and Valium while at [facility].   -- no s/sx of withdrawal presently  -- was evaluated by chem dep at [facility] -- planning to complete Rule 25 when discharged, then enroll in treatment     Nausea and vomiting: Resolved  Dehydration: Resolved  Onset the past few days prior to admission, very poor oral intake. Patient hypovolemic with metabolic derangements likely due in part to losses from emesis. Condition improved with supportive cares     Mild intermittent asthma  Respiratory status currently stable.  Albuterol inh available as needed     Mild major depression, Anxiety  Noted per problem list. Has used prn hydroxyzine for anxiety without significant relief. Not specific treatment prior to admission.     Cannabis use disorder, mild  Admits to marijuana use, denies other recreational / illicit drugs. Utox is positive for cannabinoids.   Encouraged cessation     Tobacco abuse  Encouraged cessation. Nicotine patch available    FEN: NS @100ml/h, lytes as above, regular diet  DVT Prophylaxis: ambulation  Code Status: Full Code    Disposition: Anticipate discharge home tomorrow pending input per nephrology and if labs remain stable    Gisela Jameson    Interval History   Seen this morning. Ambulating in hallways. No specific complaints. Wondering if he  needs subQ heparin since he's up and walking. Denies cp/sob/cough, abd pain/n/v. Eating/drinking okay. Making urine. Planning to follow up with chem dep counselor after discharge to complete Rule 25 and enroll in treatment    -Data reviewed today: I reviewed all new labs and imaging results over the last 24 hours. I personally reviewed no images or EKG's today.    Physical Exam   Temp: 98.9  F (37.2  C) Temp src: Oral BP: (!) 146/106 Pulse: 83   Resp: 18 SpO2: 98 % O2 Device: None (Room air)    There were no vitals filed for this visit.  Vital Signs with Ranges  Temp:  [98.1  F (36.7  C)-99.6  F (37.6  C)] 98.9  F (37.2  C)  Pulse:  [75-83] 83  Resp:  [18-20] 18  BP: (146-158)/() 146/106  SpO2:  [96 %-99 %] 98 %  I/O last 3 completed shifts:  In: -   Out: 325 [Urine:325]    Constitutional: Resting comfortably, alert and answering questions appropriately, NAD  Respiratory: CTAB, no wheeze/rales/rhonchi, no increased work of breathing  Cardiovascular: HRRR, no MGR, no LE edema  GI: S, NT, ND, +BS  Skin/Integumen: warm/dry  Other:      Medications     sodium chloride 100 mL/hr at 02/25/21 0728       heparin ANTICOAGULANT  5,000 Units Subcutaneous Q12H     sodium chloride (PF)  3 mL Intracatheter Q8H [facility]       Data   Recent Labs   Lab 02/25/21  0823 02/24/21  1816 02/24/21  0538 02/23/21  0417 02/23/21  0417 02/22/21  0743 02/22/21  0743 02/21/21  2235 02/21/21 2235   WBC 13.5*  --  15.2*  --  11.8*   < > 23.4*  --  30.5*   HGB 10.6*  --  11.0*  --  10.8*   < > 14.0  --  16.1   MCV 93  --  93  --  92   < > 87  --  88     --  158  --  169   < > 256  --  369    136 132*   < > 131*   < > 127*   < > 120*   POTASSIUM 3.9 4.1 3.9   < > 3.1*   < > 2.9*   < > 2.1*   CHLORIDE 98 98 94   < > 88*   < > 71*   < > 52*   CO2 32 33* 33*   < > 40*   < > 44*   < > 21   BUN 80* 77* 73*   < > 60*   < > 53*   < > 55*   CR 4.43* 4.88* 5.00*   < > 4.82*   < > 4.64*   < > 5.20*   ANIONGAP 4 5 5   < > 3   < > 12   < > 47*    SILVIANO 8.3* 8.1* 7.9*   < > 7.3*   < > 7.6*   < > 8.3*   * 108* 89   < > 117*   < > 117*   < > 189*   ALBUMIN  --   --   --   --   --   --  3.8  --  4.6   PROTTOTAL  --   --   --   --   --   --  7.3  --  9.2*   BILITOTAL  --   --   --   --   --   --  0.7  --  0.8   ALKPHOS  --   --   --   --   --   --  70  --  83   ALT  --   --   --   --   --   --  40  --  38   AST  --   --   --   --   --   --  48*  --  41    < > = values in this interval not displayed.       No results found for this or any previous visit (from the past 24 hour(s)).

## 2021-02-25 NOTE — CONSULTS
Nephrology Initial Consult  February 25, 2021      Kyle Ayala MRN:[id number] YOB: 1990  Date of Admission:2/24/2021  Primary care provider: Carrie Sanchez  Requesting physician: Richard Medina MD    ASSESSMENT AND RECOMMENDATIONS:   1.  Severe acute renal failure-presumed baseline normal kidney function.  Last known creatinine was 0.9 from 2012.  Normal renal ultrasound.  [place] urine.  Clinical course consistent with acute ischemic tubular injury secondary to prolonged prerenal ischemia.  Creatinine now has plateaued and improving.    2 Dyselectrolytemia -had presented with hypophosphatemia, hypocalcemia, hyponatremia, hypokalemia.  All likely related to profuse vomiting and volume contraction as well as poor oral intake, all of which is resolved now.    3 alcohol abuse    Recommendation-  -continue gentle IV hydration today.  I will change the fluid to Ringer's lactate.  -If renal function improving in a.m. labs tomorrow, okay to discharge from renal standpoint.  Plan to follow-up with nephrology at [facility] clinic in 2 weeks time.    Thank you for the consult. Will continue to follow along with you .        Krystian Holley MD  [facility] - Nephrology   [phone]        REASON FOR CONSULT: Acute kidney injury    HISTORY OF PRESENT ILLNESS:  Kyle Ayala is a 30 year old  male with past medical history significant for alcohol abuse, cannabis use, mild asthma, no known renal history who presented to [facility] on 2/21 with 10 days of nausea and vomiting, in setting of alcohol binge.  He was profoundly hypovolemic with severe SORAIDA and creatinine of 5.2.  Last known creatinine 0.98 as of 2012.  He had significant electrolyte abnormalities including hyponatremia, hypokalemia, hypophosphatemia, hypocalcemia as well as significant metabolic alkalosis despite severe renal failure, likely secondary to vomiting and volume contraction.  He had  significant anion gap of 45 which quickly resolved with IV fluid.  There was no osmolar gap.  His ethanol level was slightly high.  There was some ketosis on presentation which resolved as well.  He was treated with IV fluid resuscitation as well as electrolyte repletion.  All of his electrolytes have normalized now.  He was transferred to [facility] secondary to nonresolving acute kidney failure.    Work-up so far has included bland looking urine other than trace dipstick proteinuria in a concentrated urine sample.  No significant hematuria.  Renal ultrasound is unremarkable.  His creatinine has now plateaued and trending down.  It is 4.4 today compared to 5 yesterday.  Nonoliguric.    Reports feeling back to baseline now.  No nausea vomiting.  No new rash, joint pain or swelling, gross hematuria, hemoptysis, leg swelling, dyspnea or chest pain.    No new vision or hearing problems. No syncopal episodes.  No NSAID or other OTC med use  No recent weight loss, bone pain, diarrhea.         PAST MEDICAL HISTORY:  Reviewed with patient on 02/25/2021  and is as listed in HPI.       MEDICATIONS:  PTA Meds  Prior to Admission medications    Medication Sig Last Dose Taking? Auth Provider   albuterol (PROAIR HFA/PROVENTIL HFA/VENTOLIN HFA) 108 (90 Base) MCG/ACT inhaler Inhale 2 puffs into the lungs every 6 hours as needed for shortness of breath / dyspnea or wheezing  Patient not taking: Reported on 2/12/2020   Carrie Sanchez APRN CNP   gabapentin (NEURONTIN) 300 MG capsule Take 1 capsule (300 mg) by mouth 3 times daily   Carrie Sanchez APRN CNP   hydrOXYzine (VISTARIL) 50 MG capsule Take 1-2 capsules ( mg) by mouth 3 times daily as needed for anxiety or other (Insomnia)   Carrie Sanchez APRN CNP      Current Meds    sodium chloride (PF)  3 mL Intracatheter Q8H TERRI     Infusion Meds    sodium chloride 100 mL/hr at 02/25/21 6192       ALLERGIES:    Allergies   Allergen Reactions      Penicillins Other (See Comments)     Patient says he was allergic to penicllin as a child but not anymore       REVIEW OF SYSTEMS:  A comprehensive of systems was negative except as noted above.    SOCIAL HISTORY:   Reviewed with patient on 2021     FAMILY MEDICAL HISTORY:   Family History   Problem Relation Age of Onset     Alcohol/Drug Mother      Dementia Mother      Alzheimer Disease Mother      Asthma Father      Respiratory Father 54        COPD     Alcohol/Drug Father      Dementia Maternal Grandmother      Alzheimer Disease Maternal Grandfather         dementia     C.A.D. Paternal Grandfather      Heart Disease Brother         congenital heart, heart surgery when born     Mental Illness Brother      Alcohol/Drug Brother      Reviewed with patient on 2021     PHYSICAL EXAM:   Temp  Av.2  F (36.8  C)  Min: 97.6  F (36.4  C)  Max: 99.6  F (37.6  C)      Pulse  Av.4  Min: 66  Max: 118 Resp  Av.4  Min: 9  Max: 34  SpO2  Av.4 %  Min: 79 %  Max: 100 %       BP (!) 145/96   Pulse 85   Temp 98.2  F (36.8  C) (Oral)   Resp 16   SpO2 99%       Admit       GENERAL APPEARANCE: no distress,  awake  EYES: no scleral icterus, pupils equal  HENT: NC/AT,  mouth  without ulcers or lesions  Lymphatics: no cervical or supraclavicular LAD  Endo: no moon facies, no goiter  Pulmonary: lungs clear to auscultation with equal breath sounds bilaterally, no clubbing  CV: regular rhythm, normal rate, no rub   - JVP -   - Edema-  GI: soft, nontender,   MS: no evidence of inflammation in joints  : n lion  SKIN: no rash, warm, dry, no cyanosis  NEURO: face symmetric, no asterixis     LABS:   CMP  Recent Labs   Lab 21  0823 21  1816 21  0538 21  1617 21  0918 21  0743 21  0743 21  2235 21  2235    136 132* 133  --    < > 127*   < > 120*   POTASSIUM 3.9 4.1 3.9 3.7 3.0*   < > 2.9*   < > 2.1*   CHLORIDE 98 98 94 92*  --    < > 71*   < > 52*   CO2  32 33* 33* 36*  --    < > 44*   < > 21   ANIONGAP 4 5 5 5  --    < > 12   < > 47*   * 108* 89 98  --    < > 117*   < > 189*   BUN 80* 77* 73* 65*  --    < > 53*   < > 55*   CR 4.43* 4.88* 5.00* 4.93*  --    < > 4.64*   < > 5.20*   GFRESTIMATED 17* 15* 14* 15*  --    < > 16*   < > 14*   GFRESTBLACK 19* 17* 17* 17*  --    < > 18*   < > 16*   SILVIANO 8.3* 8.1* 7.9* 7.8*  --    < > 7.6*   < > 8.3*   MAG  --   --  2.9*  --  3.1*  --  3.9*  --  5.9*   PHOS  --   --  3.5  --  4.8*  --  13.3*  --  >18.0*   PROTTOTAL  --   --   --   --   --   --  7.3  --  9.2*   ALBUMIN  --   --   --   --   --   --  3.8  --  4.6   BILITOTAL  --   --   --   --   --   --  0.7  --  0.8   ALKPHOS  --   --   --   --   --   --  70  --  83   AST  --   --   --   --   --   --  48*  --  41   ALT  --   --   --   --   --   --  40  --  38    < > = values in this interval not displayed.     CBC  Recent Labs   Lab 02/25/21  0823 02/24/21  0538 02/23/21  0417 02/22/21  1546   HGB 10.6* 11.0* 10.8* 13.2*   WBC 13.5* 15.2* 11.8* 20.0*   RBC 3.40* 3.48* 3.44* 4.20*   HCT 31.6* 32.2* 31.6* 37.0*   MCV 93 93 92 88   MCH 31.2 31.6 31.4 31.4   MCHC 33.5 34.2 34.2 35.7   RDW 12.6 12.3 12.8 12.7    158 169 226     INR  Recent Labs   Lab 02/22/21  1546   PTT 31     ABG  Recent Labs   Lab 02/23/21  0417 02/22/21  1013 02/22/21  0812 02/22/21  0125   PH  --  7.52*  --   --    PCO2  --  62*  --   --    PO2  --  74*  --   --    HCO3  --  51*  --   --    O2PER 21% 40% 40 21%      URINE STUDIES  Recent Labs   Lab Test 02/24/21  1345 02/22/21  0805 03/08/19  0036 10/28/16  1355   COLOR Yellow Yellow Yellow Yellow   APPEARANCE Clear Cloudy Clear Clear   URINEGLC 50* Negative Negative Negative   URINEBILI Negative Negative Negative Negative   URINEKETONE Negative Negative 20* 40*   SG 1.019 1.014 1.018 1.029   UBLD Negative Negative Negative Negative   URINEPH 7.0 6.0 8.0* 7.5*   PROTEIN 30* 100* 100* 30*   NITRITE Negative Negative Negative Negative   LEUKEST  Small* Negative Negative Negative   RBCU 3* 1 2 2   WBCU 16* 4 2 3*     Recent Labs   Lab Test 02/24/21  1345   UTPG 0.33*     PTH  Recent Labs   Lab Test 02/22/21  1152   PTHI 548*     IRON STUDIES  No lab results found.    IMAGING:  Personally reviewed the images and findings are as listed in HPI     Krystian Holley MD

## 2021-02-25 NOTE — PLAN OF CARE
Summary:   Pt initially admitted to [facility] with alcohol withdrawal. Found to have SORAIDA, not improving with IV Fluid. Transferred here for nephrology consult.  DATE & TIME: 2/24/2021  PM shift    Cognitive Concerns/ Orientation : A&OX4  BEHAVIOR & AGGRESSION TOOL COLOR: Green  CIWA SCORE: [place]/ completed  ABNL VS/O2:VSS on RA  MOBILITY: Ind  PAIN MANAGMENT: Denies  DIET: Regular  BOWEL/BLADDER: Continent  ABNL LAB/BG: Crt of 4.88  DRAIN/DEVICES: PIV with IVF at 100mL/hr  TELEMETRY RHYTHM: NA  SKIN: Intact except for small bruises  TESTS/PROCEDURES: NA  D/C DAY/GOALS/PLACE: Pending  OTHER IMPORTANT INFO: Pt little anxious, ativan given per pt request. Continue to monitor.

## 2021-02-26 VITALS
SYSTOLIC BLOOD PRESSURE: 167 MMHG | OXYGEN SATURATION: 95 % | DIASTOLIC BLOOD PRESSURE: 111 MMHG | TEMPERATURE: 98.6 F | HEART RATE: 86 BPM | RESPIRATION RATE: 16 BRPM

## 2021-02-26 LAB
ANION GAP SERPL CALCULATED.3IONS-SCNC: 6 MMOL/L (ref 3–14)
BUN SERPL-MCNC: 59 MG/DL (ref 7–30)
CALCIUM SERPL-MCNC: 8.7 MG/DL (ref 8.5–10.1)
CHLORIDE SERPL-SCNC: 105 MMOL/L (ref 94–109)
CO2 SERPL-SCNC: 27 MMOL/L (ref 20–32)
CREAT SERPL-MCNC: 3.47 MG/DL (ref 0.66–1.25)
GFR SERPL CREATININE-BSD FRML MDRD: 22 ML/MIN/{1.73_M2}
GLUCOSE SERPL-MCNC: 90 MG/DL (ref 70–99)
MAGNESIUM SERPL-MCNC: 2 MG/DL (ref 1.6–2.3)
POTASSIUM SERPL-SCNC: 4 MMOL/L (ref 3.4–5.3)
SODIUM SERPL-SCNC: 138 MMOL/L (ref 133–144)

## 2021-02-26 PROCEDURE — 99239 HOSP IP/OBS DSCHRG MGMT >30: CPT | Performed by: INTERNAL MEDICINE

## 2021-02-26 PROCEDURE — 250N000013 HC RX MED GY IP 250 OP 250 PS 637: Performed by: HOSPITALIST

## 2021-02-26 PROCEDURE — 36415 COLL VENOUS BLD VENIPUNCTURE: CPT | Performed by: INTERNAL MEDICINE

## 2021-02-26 PROCEDURE — 83735 ASSAY OF MAGNESIUM: CPT | Performed by: INTERNAL MEDICINE

## 2021-02-26 PROCEDURE — 258N000003 HC RX IP 258 OP 636: Performed by: INTERNAL MEDICINE

## 2021-02-26 PROCEDURE — 80048 BASIC METABOLIC PNL TOTAL CA: CPT | Performed by: INTERNAL MEDICINE

## 2021-02-26 RX ADMIN — ACETAMINOPHEN 650 MG: 325 TABLET, FILM COATED ORAL at 00:17

## 2021-02-26 RX ADMIN — LORAZEPAM 1 MG: 0.5 TABLET ORAL at 02:06

## 2021-02-26 RX ADMIN — SODIUM CHLORIDE, POTASSIUM CHLORIDE, SODIUM LACTATE AND CALCIUM CHLORIDE: 600; 310; 30; 20 INJECTION, SOLUTION INTRAVENOUS at 06:11

## 2021-02-26 ASSESSMENT — ACTIVITIES OF DAILY LIVING (ADL)
ADLS_ACUITY_SCORE: 10

## 2021-02-26 NOTE — DISCHARGE SUMMARY
[facility]    Discharge Summary  Hospitalist    Date of Admission:  2/24/2021  Date of Discharge:  2/26/2021  Discharging Provider: Gisela Jameson    Discharge Diagnoses   Acute Renal Failure, suspect secondary to ATN: Improved  --------------------------------------------------------  Hypovolemic hyponatremia: Resolved  Hyperphosphatemia: Resolved  Hypocalcemia: Improved  Hypokalemia: Resolved  Hypermagnesemia: Improved  AGMA: Resolved  Acute respiratory failure with hypoxia and hypercarbia: Improved   Elevated d-dimer  Leukocytosis: Improved  Alcohol dependence with withdrawal: Improved  Nausea and vomiting: Resolved  Dehydration: Resolved  Mild intermittent asthma  Mild major depression, Anxiety  Cannabis use disorder, mild  Tobacco abuse    History of Present Illness   Kyle Ayala is a 30 year old male with PMHx of alcohol abuse/dependence, cannabis use and mild asthma. He was admitted to [facility] on 2/21/2021 with a 10d hx of nausea/vomiting and alcohol withdrawal.  Had noted electrolyte disturbance and elevated Cr (5.2). His electrolytes were replaced and he was given IV hydration, however his renal function did not significantly improve, prompting transfer to [facility] on 2/24/2021 for evaluation per nephrology.      Hospital Course   Kyle Ayala was admitted on 2/24/2021.  The following problems were addressed during his hospitalization:    Acute Renal Failure, suspect secondary to ATN  On admission to [facility], had marked lab disturbances including BUN 55 and Cr 5.2. No significant improvement after IV hydration. Cr remains 4-5. Additional workup pursued -- UA bland other than some proteinuria, FENa 0.5%, Salvatore 17 2/22 -- 25 on 2/24, renal US unremarkable, CK 2300 --1300, AG improved. Continues to make urine.    Renal function steadily improved during stay with ongoing IVFs. Cr 3.47 on the day of discharge. Was making urine. No additional workup needed per  nephology.     Discharged home in stable condition.  Encouraged to maintain hydration. Will follow with PCP int he next week and nephrology in 2wks     Hypovolemic hyponatremia: Resolved  Hyperphosphatemia: Resolved  Hypocalcemia: Improved  Hypokalemia: Resolved  Hypermagnesemia: Improved  Elevated anion gap: Resolved  On admission to [facility], Na 120, K 2.1, Ca 8.3, Phos >18 an anion gap 47.  Na improved with IVFs, did require DDAVP at one point to slow rate correction. Elevated phos attributed to SORAIDA. PTH elevated (548) consistent with Vit D deficiency vs CKD. Vitamin D level low. Given additional Ca (with calcium gluconate). K replacement. Mag normalized with IVFs. Lytes remained stable throughout the remainder of his stay.     Acute respiratory failure with hypoxia and hypercarbia: Improved   Elevated d-dimer  On admission to [facility], he was needing 3-5 liters oxygen.  A chest x-ray was unremarkable. COVID/influenza PCR negative. A D-dimer was elevated at 1.1.  VQ scan was done, which showed no PE. His respiratory status has improved and is not maintaining sats on RA      Leukocytosis: Improving  Admit WBC 30.5. Afebrile. UA without evidence for infection. Chest x-ray clear. No obvious evidence of infection by history or exam. Suspected secondary to above, no indication for antibiotics. WBc improving at thei time of discharge.      Alcohol dependence with withdrawal  Drinks 8-10 shots of 99 proof alcohol daily. Last drink either 2/17 or 2/18, onset of withdrawal symptoms on 2/20. Prior history of withdrawal, no known seizures. Has been through treatment in the past, last time was a few years ago. Expresses desire to quit knowing it is harmful to his health. Treated with gabapentin and Valium while at [facility].     Was evaluated by chem dep at [facility] -- planning to complete Rule 25 when discharged, then enroll in treatment.     Nausea and vomiting: Resolved  Dehydration: Resolved  Onset the past few days prior to  admission, very poor oral intake. Patient hypovolemic with metabolic derangements likely due in part to losses from emesis. Condition improved with supportive cares     Mild intermittent asthma  Respiratory status currently stable.  Albuterol inh available as needed     Mild major depression, Anxiety  Noted per problem list. Has used prn hydroxyzine for anxiety without significant relief. Not specific treatment prior to admission.     Cannabis use disorder, mild  Admits to marijuana use, denies other recreational / illicit drugs. Utox is positive for cannabinoids.   Encouraged cessation     Tobacco abuse  Encouraged cessation. Nicotine patch available    Gisela Jameson DO    Code Status   Full Code       Primary Care Physician   Carrie Sanchez    Physical Exam   Temp: 98.6  F (37  C) Temp src: Oral BP: (!) 167/111 Pulse: 86   Resp: 16 SpO2: 95 % O2 Device: None (Room air)    There were no vitals filed for this visit.  Vital Signs with Ranges  Temp:  [98.2  F (36.8  C)-99.1  F (37.3  C)] 98.6  F (37  C)  Pulse:  [78-86] 86  Resp:  [16] 16  BP: (144-167)/() 167/111  SpO2:  [95 %-99 %] 95 %  I/O last 3 completed shifts:  In: 2931 [P.O.:480; I.V.:2451]  Out: 225 [Urine:225]    General: Resting comfortably, alert, conversive, NAD  CVS: HRRR, no MGR, no LE edema  Respiratory: CTAB, no wheeze/rales/rhonchi, no increased work of breathing  GI: S, NT, ND, +BS  Skin: Warm/dry  Neuro: CN 2-12 intact, no focal motor/sensory deficits, no gait disturbance    Discharge Disposition   Discharged to home  Condition at discharge: Stable    Consultations This Hospital Stay   NEPHROLOGY IP CONSULT  CARE MANAGEMENT / SOCIAL WORK IP CONSULT    Time Spent on this Encounter   Gisela WHITE DO, personally saw the patient today and spent greater than 30 minutes discharging this patient.    Discharge Orders      Reason for your hospital stay    Management of your kidney injury. You were seen by a nephrologist  this stay. You kidney function began to improve on it's own so no further workup was needed.     Activity    Your activity upon discharge: activity as tolerated     Follow-up and recommended labs and tests     Follow up with nephrology (kidney specialist) in clinic in 2 weeks with repeat labs to ensure your kidney function continues to improve.   Follow up with chem dep counselor as previously discussed to complete Rule 25 and determine treatment plan for your alcohol use.     Diet    Follow this diet upon discharge: Regular     Discharge Medications   Current Discharge Medication List      CONTINUE these medications which have NOT CHANGED    Details   albuterol (PROAIR HFA/PROVENTIL HFA/VENTOLIN HFA) 108 (90 Base) MCG/ACT inhaler Inhale 2 puffs into the lungs every 6 hours as needed for shortness of breath / dyspnea or wheezing  Qty: 1 Inhaler, Refills: 0    Associated Diagnoses: Moderate persistent asthma without complication      hydrOXYzine (VISTARIL) 50 MG capsule Take 1-2 capsules ( mg) by mouth 3 times daily as needed for anxiety or other (Insomnia)  Qty: 30 capsule, Refills: 1    Associated Diagnoses: Anxiety         STOP taking these medications       gabapentin (NEURONTIN) 300 MG capsule Comments:   Reason for Stopping:             Allergies   Allergies   Allergen Reactions     Penicillins Other (See Comments)     Patient says he was allergic to penicllin as a child but not anymore     Data   Most Recent 3 CBC's:  Recent Labs   Lab Test 02/25/21  0823 02/24/21  0538 02/23/21  0417   WBC 13.5* 15.2* 11.8*   HGB 10.6* 11.0* 10.8*   MCV 93 93 92    158 169      Most Recent 3 BMP's:  Recent Labs   Lab Test 02/26/21  0757 02/25/21  0823 02/24/21  1816    134 136   POTASSIUM 4.0 3.9 4.1   CHLORIDE 105 98 98   CO2 27 32 33*   BUN 59* 80* 77*   CR 3.47* 4.43* 4.88*   ANIONGAP 6 4 5   SILVIANO 8.7 8.3* 8.1*   GLC 90 155* 108*     Most Recent 2 LFT's:  Recent Labs   Lab Test 02/22/21  0743  02/21/21  2235   AST 48* 41   ALT 40 38   ALKPHOS 70 83   BILITOTAL 0.7 0.8     Most Recent Cholesterol Panel:  Recent Labs   Lab Test 02/22/21  1152   CHOL 166   LDL 83   HDL 54   TRIG 146     Most Recent 6 Bacteria Isolates From Any Culture (See EPIC Reports for Culture Details):  Recent Labs   Lab Test 02/24/21  1345 02/27/16  2218 07/30/14  1555 07/27/14  1647 06/12/14  1527   CULT No growth <10,000 colonies/mL Mixed gram positive mor No Beta Streptococcus isolated Beta hemolytic Streptococcus, not group A* Beta hemolytic Streptococcus, not group A*     Most Recent TSH, T4 and A1c Labs:  Recent Labs   Lab Test 02/22/21  1152   TSH 0.20*   T4 1.28

## 2021-02-26 NOTE — CONSULTS
Care Management Consult    General Information  Assessment completed with: Patient          Primary Care Provider verified and updated as needed: Pt requested change in provider  Readmission within the last 30 days: No                Communication Assessment  Patient's communication style: spoken language (English or Bilingual)    Hearing Difficulty or Deaf: no   Wear Glasses or Blind: no    Cognitive  Cognitive/Neuro/Behavioral: Very pleasant, calm    Living Environment:   People in home: Lives with girlfriend      Current living Arrangements: apartment       Able to return to prior arrangements: yes        Family/Social Support:  Care provided by:self    Provides care for:  No one                  Current Resources:   Patient receiving home care services:  No     Community Resources:    Equipment currently used at home: none  Supplies currently used at home: none     Employment/Financial:  Employment Status: employed FT         Financial Concerns:   No concerns identified            Lifestyle & Psychosocial Needs:        Socioeconomic History     Marital status: Single     Spouse name: Not on file     Number of children: Not on file     Years of education: Not on file     Highest education level: Not on file     Tobacco Use     Smoking status: Current Every Day Smoker     Packs/day: 1.00     Types: Cigarettes     Smokeless tobacco: Never Used     Tobacco comment: 3/4 of a pack    Substance and Sexual Activity     Alcohol use: Not Currently     Drug use: Yes     Types: Marijuana     Comment: marijuana occ.     Sexual activity: Yes     Partners: Female     Birth control/protection: None       Functional Status:  Prior to admission patient needed assistance: No             Mental Health Status:    No current concerns      Chemical Dependency Status:     Pt noted he went overboard with alcohol while in [place] and then had a welcome back party when he came back to [place] that lasted a few days.  He was given information for  a Rule 25 assessment when he was hospitalized at [facility] prior to transferring to [facility].           Values/Beliefs:  Spiritual, Cultural Beliefs, Orthodoxy Practices, Values that affect care: no               Additional Information:  PCP and Nephrology follow up appointments have been scheduled.  There was no appointments available at [facility] for Nephrology until end of March, so appt was scheduled in [place].  Pt was open to this.  [facility] will call pt if he can be fit in.  If this can be achieved, then he can cancel the [place] appt.  Pt is aware.  His girlfriend is here and will provide transportation home.  No other discharge needs have been identified.    Ariadna Nunez, RN   Inpatient Care Management  [phone]

## 2021-02-26 NOTE — PLAN OF CARE
Discharge    Patient discharged to home via wheelchair with significant other Lainey  Care plan note Summary:   SORAIDA  DATE & TIME: 2/26/2021 Days, discharged home today 1015  Cognitive Concerns/ Orientation : A&OX4  BEHAVIOR & AGGRESSION TOOL COLOR: Green  CIWA SCORE: NA   ABNL VS/O2:VSS on RA exc. /111  MOBILITY: Ind  PAIN MANAGMENT: Denies  DIET: Regular  BOWEL/BLADDER: Continent  ABNL LAB/BG: Crt of 3.47, improved  DRAIN/DEVICES: PIV removed  TELEMETRY RHYTHM: NA  SKIN: Small bruises  TESTS/PROCEDURES: NA  D/C DAY/GOALS/PLACE: If renal function improved in a.m. labs today, follow-up with nephrology out patient.    Listed belongings gathered and returned to patient. Yes  Care Plan and Patient education resolved: Yes  Prescriptions if needed, hard copies sent with patient  NA  Home and hospital acquired medications returned to patient: NA  Medication Bin checked and emptied on discharge Yes  Follow up appointment made for patient: Yes

## 2021-02-26 NOTE — PLAN OF CARE
Summary:   SORAIDA  DATE & TIME: 2/25/2021 [id number]  Cognitive Concerns/ Orientation : A&OX4  BEHAVIOR & AGGRESSION TOOL COLOR: Green  CIWA SCORE: NA   ABNL VS/O2:VSS on RA exc. /98  MOBILITY: Ind  PAIN MANAGMENT: Tylenol given for abdominal discomfort. Ativan given for anxiety per pt request  DIET: Regular  BOWEL/BLADDER: Continent  ABNL LAB/BG: Crt of 4.43, WBC 13.5,   DRAIN/DEVICES: PIV with LR @75 mL/hr  TELEMETRY RHYTHM: NA  SKIN: Small bruises  TESTS/PROCEDURES: NA  D/C DAY/GOALS/PLACE: If renal function improving in a.m. labs today, okay to discharge from renal standpoint. Plan to follow-up with nephrology out patient.  OTHER IMPORTANT INFO: Tylenol given x1 for abdominal pain. Ativan given per pt request for anxiety.  Continue to monitor.

## 2021-02-26 NOTE — PLAN OF CARE
Summary:   SORAIDA  DATE & TIME: 2/25/2021  [id number]  Cognitive Concerns/ Orientation : A&OX4  BEHAVIOR & AGGRESSION TOOL COLOR: Green  CIWA SCORE: NA   ABNL VS/O2:VSS on RA  MOBILITY: Ind  PAIN MANAGMENT: Denied  DIET: Regular  BOWEL/BLADDER: Continent  ABNL LAB/BG: Crt of 4.43, WBC 13.5,   DRAIN/DEVICES: PIV with IVF at 75mL/hr  TELEMETRY RHYTHM: NA  SKIN: Small bruises  TESTS/PROCEDURES: NA  D/C DAY/GOALS/PLACE: Possible discharge tomorrow after nephrology consult.  OTHER IMPORTANT INFO: Ativan given per pt request for anxiety.  Continue to monitor.

## 2021-03-03 ENCOUNTER — OFFICE VISIT (OUTPATIENT)
Dept: FAMILY MEDICINE | Facility: CLINIC | Age: 31
End: 2021-03-03
Payer: COMMERCIAL

## 2021-03-03 VITALS
TEMPERATURE: 100.5 F | BODY MASS INDEX: 22.48 KG/M2 | OXYGEN SATURATION: 98 % | HEIGHT: 70 IN | WEIGHT: 157 LBS | HEART RATE: 108 BPM | DIASTOLIC BLOOD PRESSURE: 70 MMHG | SYSTOLIC BLOOD PRESSURE: 134 MMHG

## 2021-03-03 DIAGNOSIS — Z09 HOSPITAL DISCHARGE FOLLOW-UP: ICD-10-CM

## 2021-03-03 DIAGNOSIS — F33.1 MODERATE EPISODE OF RECURRENT MAJOR DEPRESSIVE DISORDER (H): ICD-10-CM

## 2021-03-03 DIAGNOSIS — N17.9 ACUTE RENAL FAILURE, UNSPECIFIED ACUTE RENAL FAILURE TYPE (H): Primary | ICD-10-CM

## 2021-03-03 DIAGNOSIS — F41.1 GAD (GENERALIZED ANXIETY DISORDER): ICD-10-CM

## 2021-03-03 LAB
ANION GAP SERPL CALCULATED.3IONS-SCNC: 6 MMOL/L (ref 3–14)
BUN SERPL-MCNC: 17 MG/DL (ref 7–30)
CALCIUM SERPL-MCNC: 8.6 MG/DL (ref 8.5–10.1)
CHLORIDE SERPL-SCNC: 109 MMOL/L (ref 94–109)
CO2 SERPL-SCNC: 26 MMOL/L (ref 20–32)
CREAT SERPL-MCNC: 1.34 MG/DL (ref 0.66–1.25)
ERYTHROCYTE [DISTWIDTH] IN BLOOD BY AUTOMATED COUNT: 14 % (ref 10–15)
GFR SERPL CREATININE-BSD FRML MDRD: 71 ML/MIN/{1.73_M2}
GLUCOSE SERPL-MCNC: 73 MG/DL (ref 70–99)
HCT VFR BLD AUTO: 34.1 % (ref 40–53)
HGB BLD-MCNC: 11.1 G/DL (ref 13.3–17.7)
MCH RBC QN AUTO: 31.4 PG (ref 26.5–33)
MCHC RBC AUTO-ENTMCNC: 32.6 G/DL (ref 31.5–36.5)
MCV RBC AUTO: 96 FL (ref 78–100)
PLATELET # BLD AUTO: 481 10E9/L (ref 150–450)
POTASSIUM SERPL-SCNC: 3.2 MMOL/L (ref 3.4–5.3)
RBC # BLD AUTO: 3.54 10E12/L (ref 4.4–5.9)
SODIUM SERPL-SCNC: 141 MMOL/L (ref 133–144)
WBC # BLD AUTO: 10.8 10E9/L (ref 4–11)

## 2021-03-03 PROCEDURE — 99214 OFFICE O/P EST MOD 30 MIN: CPT | Performed by: NURSE PRACTITIONER

## 2021-03-03 PROCEDURE — 80048 BASIC METABOLIC PNL TOTAL CA: CPT | Performed by: NURSE PRACTITIONER

## 2021-03-03 PROCEDURE — 85027 COMPLETE CBC AUTOMATED: CPT | Performed by: NURSE PRACTITIONER

## 2021-03-03 PROCEDURE — 36415 COLL VENOUS BLD VENIPUNCTURE: CPT | Performed by: NURSE PRACTITIONER

## 2021-03-03 RX ORDER — FLUOXETINE 10 MG/1
CAPSULE ORAL
Qty: 60 CAPSULE | Refills: 1 | Status: SHIPPED | OUTPATIENT
Start: 2021-03-03 | End: 2022-03-10

## 2021-03-03 ASSESSMENT — ANXIETY QUESTIONNAIRES
GAD7 TOTAL SCORE: 12
2. NOT BEING ABLE TO STOP OR CONTROL WORRYING: MORE THAN HALF THE DAYS
5. BEING SO RESTLESS THAT IT IS HARD TO SIT STILL: MORE THAN HALF THE DAYS
3. WORRYING TOO MUCH ABOUT DIFFERENT THINGS: MORE THAN HALF THE DAYS
1. FEELING NERVOUS, ANXIOUS, OR ON EDGE: MORE THAN HALF THE DAYS
7. FEELING AFRAID AS IF SOMETHING AWFUL MIGHT HAPPEN: NOT AT ALL
IF YOU CHECKED OFF ANY PROBLEMS ON THIS QUESTIONNAIRE, HOW DIFFICULT HAVE THESE PROBLEMS MADE IT FOR YOU TO DO YOUR WORK, TAKE CARE OF THINGS AT HOME, OR GET ALONG WITH OTHER PEOPLE: VERY DIFFICULT
6. BECOMING EASILY ANNOYED OR IRRITABLE: MORE THAN HALF THE DAYS

## 2021-03-03 ASSESSMENT — PATIENT HEALTH QUESTIONNAIRE - PHQ9
5. POOR APPETITE OR OVEREATING: MORE THAN HALF THE DAYS
SUM OF ALL RESPONSES TO PHQ QUESTIONS 1-9: 14

## 2021-03-03 ASSESSMENT — MIFFLIN-ST. JEOR: SCORE: 1683.4

## 2021-03-03 NOTE — PATIENT INSTRUCTIONS
Schedule counseling.    Start fluoxetine 10 mg daily for one week, then increase to 20 mg daily.  Follow up in one month.

## 2021-03-03 NOTE — PROGRESS NOTES
Assessment & Plan     Acute renal failure, unspecified acute renal failure type (H)  Improving  - Basic metabolic panel  (Ca, Cl, CO2, Creat, Gluc, K, Na, BUN)  - CBC with platelets    Hospital discharge follow-up  Symptomatically improving.  Patient reports that he has not had any alcohol since discharge.  Offered chemical dependency referral, patient declined.  - Basic metabolic panel  (Ca, Cl, CO2, Creat, Gluc, K, Na, BUN)  - CBC with platelets    Moderate episode of recurrent major depressive disorder (H)  Discussed treatment for anxiety and depression.  Recommend treatment with an SSRI.  Discussed with patient that it can take up to a month for medication to work, however this would be a better long-term therapy for his depression and anxiety.  Offered to place referral for counseling, he declined.  He did accept prescription for fluoxetine and states he will follow-up in 1 month.  - FLUoxetine (PROZAC) 10 MG capsule; Take 1 capsule (10 mg) by mouth daily for 7 days, THEN 2 capsules (20 mg) daily.  - Basic metabolic panel  (Ca, Cl, CO2, Creat, Gluc, K, Na, BUN)  - CBC with platelets    BRIGETTE (generalized anxiety disorder)  See above.  In addition, I declined to give him a prescription for lorazepam.  Discussed with patient that my reasons for doing this was that it does nothing to prevent his anxiety from coming, that SSRIs are considered first-line treatment for anxiety disorders, and that benzos are highly addictive and I am reluctant to prescribe them in the setting of his ongoing alcoholism.  - FLUoxetine (PROZAC) 10 MG capsule; Take 1 capsule (10 mg) by mouth daily for 7 days, THEN 2 capsules (20 mg) daily.  - Basic metabolic panel  (Ca, Cl, CO2, Creat, Gluc, K, Na, BUN)  - CBC with platelets        Return in about 4 weeks (around 3/31/2021) for Depression/anxiety Recheck.    The risks, benefits and treatment options of prescribed medications or other treatments have been discussed with the patient. The  patient verbalized their understanding and should call or follow up if no improvement or if they develop further problems.    YOSEF Perez CNP  [facility] FREDDIE Wright is a 30 year old who presents for the following health issues     \A Chronology of [place] Hospit[facility]\""         Hospital Follow-up Visit:    Hospital/Nursing Home/IP Rehab Facility: [facility]  And transfer to [facility]  Date of Admission: 2/21-24 [facility]  Date of Discharge: 2/24-26 at [facility] [place]  Reason(s) for Admission: alcohol abuse/withdrawl;  Severe SORAIDA;  Acute renal failure      Was your hospitalization related to COVID-19? No   Problems taking medications regularly:  n/a  Medication changes since discharge: n/a  Problems adhering to non-medication therapy:  None    Summary of hospitalization:  [facility] discharge summary reviewed  Diagnostic Tests/Treatments reviewed.  Follow up needed: none  Other Healthcare Providers Involved in Patient s Care:         None  Update since discharge: improved.       Post Discharge Medication Reconciliation: discharge medications reconciled, continue medications without change.  Plan of care communicated with patient              HPI: 30-year-old male presented to the ER on February 21 with concerns for alcohol withdrawal symptoms.  Patient has a history of alcoholism.  Reported that he began drinking 9 days prior and had 6 days of heavy alcohol consumption, then stopped drinking for 3 days at which point he began to feel sick.  Symptoms reported included nausea, poor appetite, vomiting, malaise, body aches, headaches and chest pain.  He also reported feelings of depression and anxiety.  He has been in treatment for alcohol abuse in the past.  He also is a heavy tobacco smoker and occasional marijuana smoker.  Labs in the ER revealed a creatinine of 5.2, sodium of 120, potassium of 2.1, calcium of 2.8, magnesium of 5.9 and a phosphorus of greater than 18.0.  He was therefore  "admitted to hospital for acute kidney injury in the setting of alcohol withdrawal.  On February 24 he was transferred to [facility] as his kidney function was not improving and he required nephrology consultation.  Renal function and electrolyte abnormalities improved and he was discharged from hospital on February 26.    Today patient states that he is feeling tired but otherwise improved.  He denies any alcohol consumption since his discharge.  He is trying to drink plenty of water and is eating well.  He denies need for chemical dependency treatment, however states that he plans to start counseling at [facility].  Depression and anxiety screening were positive today.  Patient reports that this has been an issue on and off throughout the years.  He feels that part of his alcohol problem is self-medicating for his anxiety and depression.  He has been on antidepressants on and off throughout the years and does not feel like they ever really help him.  He is unable to say how long he has been on these medications and whether or not he took them regularly.  He states he has been given lorazepam in the past that worked very well and he would like to be given another prescription for that.          Review of Systems   Constitutional, HEENT, cardiovascular, pulmonary, gi and gu systems are negative, except as otherwise noted.      Objective    /70 (BP Location: Right arm)   Pulse 108   Temp 100.5  F (38.1  C) (Tympanic)   Ht 1.778 m (5' 10\")   Wt 71.2 kg (157 lb)   SpO2 98%   BMI 22.53 kg/m    Body mass index is 22.53 kg/m .  Physical Exam   GENERAL: healthy, alert and no distress  NECK: no adenopathy, no asymmetry, masses, or scars and thyroid normal to palpation  RESP: lungs clear to auscultation - no rales, rhonchi or wheezes  CV: regular rate and rhythm, normal S1 S2, no S3 or S4, no murmur, click or rub, no peripheral edema and peripheral pulses strong  ABDOMEN: soft, nontender, no " hepatosplenomegaly, no masses and bowel sounds normal  MS: no gross musculoskeletal defects noted, no edema  PSYCH: Somewhat argumentative when discussing treatment for anxiety when I said I did not feel benzos were appropriate.    Results for orders placed or performed in visit on 03/03/21 (from the past 24 hour(s))   Basic metabolic panel  (Ca, Cl, CO2, Creat, Gluc, K, Na, BUN)   Result Value Ref Range    Sodium 141 133 - 144 mmol/L    Potassium 3.2 (L) 3.4 - 5.3 mmol/L    Chloride 109 94 - 109 mmol/L    Carbon Dioxide 26 20 - 32 mmol/L    Anion Gap 6 3 - 14 mmol/L    Glucose 73 70 - 99 mg/dL    Urea Nitrogen 17 7 - 30 mg/dL    Creatinine 1.34 (H) 0.66 - 1.25 mg/dL    GFR Estimate 71 >60 mL/min/[1.73_m2]    GFR Estimate If Black 82 >60 mL/min/[1.73_m2]    Calcium 8.6 8.5 - 10.1 mg/dL   CBC with platelets   Result Value Ref Range    WBC 10.8 4.0 - 11.0 10e9/L    RBC Count 3.54 (L) 4.4 - 5.9 10e12/L    Hemoglobin 11.1 (L) 13.3 - 17.7 g/dL    Hematocrit 34.1 (L) 40.0 - 53.0 %    MCV 96 78 - 100 fl    MCH 31.4 26.5 - 33.0 pg    MCHC 32.6 31.5 - 36.5 g/dL    RDW 14.0 10.0 - 15.0 %    Platelet Count 481 (H) 150 - 450 10e9/L

## 2021-03-04 ASSESSMENT — ANXIETY QUESTIONNAIRES: GAD7 TOTAL SCORE: 12

## 2021-03-11 ENCOUNTER — DOCUMENTATION ONLY (OUTPATIENT)
Dept: OTHER | Facility: CLINIC | Age: 31
End: 2021-03-11

## 2021-04-18 ENCOUNTER — HEALTH MAINTENANCE LETTER (OUTPATIENT)
Age: 31
End: 2021-04-18

## 2021-10-03 ENCOUNTER — HEALTH MAINTENANCE LETTER (OUTPATIENT)
Age: 31
End: 2021-10-03

## 2022-05-15 ENCOUNTER — HEALTH MAINTENANCE LETTER (OUTPATIENT)
Age: 32
End: 2022-05-15

## 2022-09-10 ENCOUNTER — HEALTH MAINTENANCE LETTER (OUTPATIENT)
Age: 32
End: 2022-09-10

## 2023-06-03 ENCOUNTER — HEALTH MAINTENANCE LETTER (OUTPATIENT)
Age: 33
End: 2023-06-03